# Patient Record
Sex: FEMALE | Race: WHITE | NOT HISPANIC OR LATINO | Employment: OTHER | URBAN - METROPOLITAN AREA
[De-identification: names, ages, dates, MRNs, and addresses within clinical notes are randomized per-mention and may not be internally consistent; named-entity substitution may affect disease eponyms.]

---

## 2017-01-06 DIAGNOSIS — F11.20 NARCOTIC DEPENDENCE (H): Primary | ICD-10-CM

## 2017-01-06 RX ORDER — BUPRENORPHINE HYDROCHLORIDE AND NALOXONE HYDROCHLORIDE DIHYDRATE 2; .5 MG/1; MG/1
1 TABLET SUBLINGUAL 2 TIMES DAILY
Qty: 60 TABLET | Refills: 0 | Status: CANCELLED | OUTPATIENT
Start: 2017-01-06

## 2017-01-06 RX ORDER — BUPRENORPHINE HYDROCHLORIDE AND NALOXONE HYDROCHLORIDE DIHYDRATE 2; .5 MG/1; MG/1
1 TABLET SUBLINGUAL 2 TIMES DAILY
Qty: 60 TABLET | Refills: 0 | Status: SHIPPED | OUTPATIENT
Start: 2017-01-06 | End: 2017-02-15

## 2017-01-06 NOTE — TELEPHONE ENCOUNTER
Dr West -- please review and sign/advise. Pt did not show for appointment on 1/2. Thank you    DAVE BillsN, RN  [facility]

## 2017-01-06 NOTE — TELEPHONE ENCOUNTER
Buprenorphine/Nalox 2MG 0.5MG SL TB      Last Written Prescription Date:  11/16/2016  Last Fill Quantity: 60,   # refills: 0  Last Office Visit with [facility] or [facility] prescribing provider: 01/02/2017  Future Office visit:       Routing refill request to provider for review/approval because:  Drug not on the [facility] or [facility] refill protocol or controlled substance

## 2017-01-13 NOTE — TELEPHONE ENCOUNTER
Left message for patient that she needs to be seen in clinic. Asked patient to call and schedule this appointment before requesting more refills.    Dagmar Santiago, DAVEN, RN  [facility]

## 2017-02-15 ENCOUNTER — OFFICE VISIT (OUTPATIENT)
Dept: FAMILY MEDICINE | Facility: CLINIC | Age: 62
End: 2017-02-15
Payer: COMMERCIAL

## 2017-02-15 VITALS
OXYGEN SATURATION: 96 % | BODY MASS INDEX: 21.94 KG/M2 | TEMPERATURE: 97.8 F | HEIGHT: 66 IN | SYSTOLIC BLOOD PRESSURE: 143 MMHG | DIASTOLIC BLOOD PRESSURE: 78 MMHG | WEIGHT: 136.5 LBS | HEART RATE: 86 BPM

## 2017-02-15 DIAGNOSIS — E11.9 TYPE 2 DIABETES MELLITUS WITHOUT COMPLICATION, WITHOUT LONG-TERM CURRENT USE OF INSULIN (H): ICD-10-CM

## 2017-02-15 DIAGNOSIS — F10.288 ALCOHOL DEPENDENCE WITH OTHER ALCOHOL-INDUCED DISORDER (H): ICD-10-CM

## 2017-02-15 DIAGNOSIS — S82.222K CLOSED DISPLACED TRANSVERSE FRACTURE OF SHAFT OF LEFT TIBIA WITH NONUNION, SUBSEQUENT ENCOUNTER: Primary | ICD-10-CM

## 2017-02-15 DIAGNOSIS — F11.20 NARCOTIC DEPENDENCE (H): ICD-10-CM

## 2017-02-15 LAB
BASOPHILS # BLD AUTO: 0 10E9/L (ref 0–0.2)
BASOPHILS NFR BLD AUTO: 0.5 %
CRP SERPL-MCNC: <2.9 MG/L (ref 0–8)
DIFFERENTIAL METHOD BLD: ABNORMAL
EOSINOPHIL # BLD AUTO: 0.1 10E9/L (ref 0–0.7)
EOSINOPHIL NFR BLD AUTO: 1.6 %
ERYTHROCYTE [DISTWIDTH] IN BLOOD BY AUTOMATED COUNT: 14.4 % (ref 10–15)
ERYTHROCYTE [SEDIMENTATION RATE] IN BLOOD BY WESTERGREN METHOD: 37 MM/H (ref 0–30)
HBA1C MFR BLD: 5.7 % (ref 4.3–6)
HCT VFR BLD AUTO: 33.1 % (ref 35–47)
HGB BLD-MCNC: 10.8 G/DL (ref 11.7–15.7)
LYMPHOCYTES # BLD AUTO: 1.1 10E9/L (ref 0.8–5.3)
LYMPHOCYTES NFR BLD AUTO: 14.3 %
MCH RBC QN AUTO: 30.4 PG (ref 26.5–33)
MCHC RBC AUTO-ENTMCNC: 32.6 G/DL (ref 31.5–36.5)
MCV RBC AUTO: 93 FL (ref 78–100)
MONOCYTES # BLD AUTO: 0.4 10E9/L (ref 0–1.3)
MONOCYTES NFR BLD AUTO: 5.5 %
NEUTROPHILS # BLD AUTO: 6.2 10E9/L (ref 1.6–8.3)
NEUTROPHILS NFR BLD AUTO: 78.1 %
PLATELET # BLD AUTO: 156 10E9/L (ref 150–450)
RBC # BLD AUTO: 3.55 10E12/L (ref 3.8–5.2)
WBC # BLD AUTO: 8 10E9/L (ref 4–11)

## 2017-02-15 PROCEDURE — 86140 C-REACTIVE PROTEIN: CPT | Performed by: FAMILY MEDICINE

## 2017-02-15 PROCEDURE — 85025 COMPLETE CBC W/AUTO DIFF WBC: CPT | Performed by: FAMILY MEDICINE

## 2017-02-15 PROCEDURE — 82306 VITAMIN D 25 HYDROXY: CPT | Performed by: FAMILY MEDICINE

## 2017-02-15 PROCEDURE — 36415 COLL VENOUS BLD VENIPUNCTURE: CPT | Performed by: FAMILY MEDICINE

## 2017-02-15 PROCEDURE — 83036 HEMOGLOBIN GLYCOSYLATED A1C: CPT | Performed by: FAMILY MEDICINE

## 2017-02-15 PROCEDURE — 85652 RBC SED RATE AUTOMATED: CPT | Performed by: FAMILY MEDICINE

## 2017-02-15 PROCEDURE — 99215 OFFICE O/P EST HI 40 MIN: CPT | Performed by: FAMILY MEDICINE

## 2017-02-15 RX ORDER — DULOXETIN HYDROCHLORIDE 20 MG/1
20 CAPSULE, DELAYED RELEASE ORAL 2 TIMES DAILY
Qty: 60 CAPSULE | Refills: 1 | Status: SHIPPED | OUTPATIENT
Start: 2017-02-15 | End: 2017-06-11

## 2017-02-15 RX ORDER — ERGOCALCIFEROL 1.25 MG/1
50000 CAPSULE, LIQUID FILLED ORAL
Qty: 8 CAPSULE | Refills: 0 | Status: SHIPPED | OUTPATIENT
Start: 2017-02-15 | End: 2017-04-06

## 2017-02-15 RX ORDER — BUPRENORPHINE HYDROCHLORIDE AND NALOXONE HYDROCHLORIDE DIHYDRATE 2; .5 MG/1; MG/1
1 TABLET SUBLINGUAL 2 TIMES DAILY
Qty: 60 TABLET | Refills: 0 | Status: SHIPPED | OUTPATIENT
Start: 2017-02-15 | End: 2017-03-15

## 2017-02-15 RX ORDER — TRAMADOL HYDROCHLORIDE 50 MG/1
TABLET ORAL
Qty: 28 TABLET | Refills: 0 | Status: SHIPPED | OUTPATIENT
Start: 2017-02-15 | End: 2018-11-05

## 2017-02-15 NOTE — MR AVS SNAPSHOT
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  -Start taking vitamin D once weekly.  -I will let you know when I get the results from lab.  -Please follow through on your DEXA scan and I will let you know when I get the results.        Follow-ups after your visit        Future tests that were ordered for you today     Open Future Orders        Priority Expected Expires Ordered    DX Hip/Pelvis/Spine Routine  2/15/2018 2/15/2017            Who to contact     If you have questions or need follow up information about today's clinic visit or your schedule please contact [facility] directly at [phone].  Normal or non-critical lab and imaging results will be communicated to you by MyChart, letter or phone within 4 business days after the clinic has received the results. If you do not hear from us within 7 days, please contact the clinic through [employer] or phone. If you have a critical or abnormal lab result, we will notify you by phone as soon as possible.  Submit refill requests through [employer] or call your pharmacy and they will forward the refill request to us. Please allow 3 business days for your refill to be completed.          Additional Information About Your Visit        MyChart Information     [employer] gives you secure access to your electronic  "health record. If you see a primary care provider, you can also send messages to your care team and make appointments. If you have questions, please call your primary care clinic.  If you do not have a primary care provider, please call [phone] and they will assist you.        Care EveryWhere ID     This is your Care EveryWhere ID. This could be used by other organizations to access your [place] medical records  [id number]        Your Vitals Were     Pulse Temperature Height Pulse Oximetry BMI (Body Mass Index)       86 97.8  F (36.6  C) (Oral) 5' 6\" (1.676 m) 96% 22.03 kg/m2        Blood Pressure from Last 3 Encounters:   02/15/17 143/78   10/19/16 140/75   07/28/16 112/60    Weight from Last 3 Encounters:   02/15/17 136 lb 8 oz (61.9 kg)   10/19/16 129 lb 1.6 oz (58.6 kg)   07/28/16 139 lb 1.6 oz (63.1 kg)              We Performed the Following     CBC with platelets and differential     CRP inflammation     ESR: Erythrocyte sedimentation rate     Hemoglobin A1c     Vitamin D Deficiency          Today's Medication Changes          These changes are accurate as of: 2/15/17 11:59 PM.  If you have any questions, ask your nurse or doctor.               Start taking these medicines.        Dose/Directions    DULoxetine 20 MG EC capsule   Commonly known as:  CYMBALTA   Started by:  Ishaan West MD        Dose:  20 mg   Take 1 capsule (20 mg) by mouth 2 times daily   Quantity:  60 capsule   Refills:  1       vitamin D 56612 UNIT capsule   Commonly known as:  ERGOCALCIFEROL   Started by:  Ishaan West MD        Dose:  07881 Units   Take 1 capsule (50,000 Units) by mouth every 7 days for 8 doses   Quantity:  8 capsule   Refills:  0         These medicines have changed or have updated prescriptions.        Dose/Directions    * traMADol 50 MG tablet   Commonly known as:  ULTRAM   This may have changed:  Another medication with the same name was added. Make sure you understand how and when to take " each.   Changed by:  Ishaan West MD        Take by mouth every 6 hours as needed for moderate pain   Refills:  0       * traMADol 50 MG tablet   Commonly known as:  ULTRAM   This may have changed:  You were already taking a medication with the same name, and this prescription was added. Make sure you understand how and when to take each.   Changed by:  Ishaan West MD        0.5  to 1 tab once daily as needed for severe pain   Quantity:  28 tablet   Refills:  0       * Notice:  This list has 2 medication(s) that are the same as other medications prescribed for you. Read the directions carefully, and ask your doctor or other care provider to review them with you.         Where to get your medicines      These medications were sent to [facility] [address] [facility], [place] - [address] [facility] [address] [facility] [address] [facility]  [address] [facility] [address]     Phone:  [phone]     DULoxetine 20 MG EC capsule    vitamin D 35010 UNIT capsule         Some of these will need a paper prescription and others can be bought over the counter.  Ask your nurse if you have questions.     Bring a paper prescription for each of these medications     buprenorphine-naloxone 2-0.5 MG Subl sublingual tablet    traMADol 50 MG tablet                Primary Care Provider Office Phone # Fax #    Ishaan West [phone]       [facility] [address]  [facility] [address]        Thank you!     Thank you for choosing [facility]  for your care. Our goal is always to provide you with excellent care. Hearing back from our patients is one way we can continue to improve our services. Please take a few minutes to complete the written survey that you may receive in the mail after your visit with us. Thank you!             Your Updated Medication List - Protect others around you: Learn how to safely use, store and throw away your  medicines at www.disposemymeds.org.          This list is accurate as of: 2/15/17 11:59 PM.  Always use your most recent med list.                   Brand Name Dispense Instructions for use    ACE/ARB NOT PRESCRIBED (INTENTIONAL)      ACE & ARB not prescribed due to Refusal by patient       blood glucose monitoring test strip    ACCU-CHEK CHASE    100 each    Use to test blood sugar 3 times daily or as directed.       * buprenorphine-naloxone 2-0.5 MG Subl sublingual tablet    SUBOXONE    60 tablet    Place 1 tablet under the tongue 2 times daily       * buprenorphine-naloxone 2-0.5 MG Subl sublingual tablet    SUBOXONE    60 tablet    Place 1 tablet under the tongue 2 times daily       clindamycin 150 MG capsule    CLEOCIN    30 capsule    Take 1 capsule (150 mg) by mouth 3 times daily       DULoxetine 20 MG EC capsule    CYMBALTA    60 capsule    Take 1 capsule (20 mg) by mouth 2 times daily       glipiZIDE 5 MG tablet    GLUCOTROL    60 tablet    Take 1 tablet (5 mg) by mouth 2 times daily (before meals)       lactulose encephalopathy 10 GM/15ML SOLUTION    CHRONULAC    5700 mL    Take 45 mLs (30 g) by mouth 3 times daily       metFORMIN 500 MG tablet    GLUCOPHAGE    180 tablet    Take 1 tablet (500 mg) by mouth 2 times daily (with meals)       metoprolol 25 MG 24 hr tablet    TOPROL-XL    90 tablet    Take 1 tablet (25 mg) by mouth At Bedtime       order for DME     1 Device    Equipment being ordered: Home blood pressure cuff       pramipexole 0.125 MG tablet    MIRAPEX    90 tablet    Take 1 tablet (0.125 mg) by mouth At Bedtime       sertraline 50 MG tablet    ZOLOFT    90 tablet    Take 1 tablet (50 mg) by mouth daily       sulfamethoxazole-trimethoprim 800-160 MG per tablet    BACTRIM DS/SEPTRA DS    20 tablet    Take 1 tablet by mouth 2 times daily       * traMADol 50 MG tablet    ULTRAM     Take by mouth every 6 hours as needed for moderate pain       * traMADol 50 MG tablet    ULTRAM    28 tablet    0.5   to 1 tab once daily as needed for severe pain       traZODone 50 MG tablet    DESYREL    90 tablet    Take 1 tablet (50 mg) by mouth nightly as needed for sleep       tretinoin 0.025 % cream    RETIN-A    60 g    Spread a pea size amount into affected area topically at bedtime.  Use sunscreen SPF>20.       valACYclovir 500 MG tablet    VALTREX    20 tablet    Take 1 tablet (500 mg) by mouth 2 times daily as needed       vitamin D 61240 UNIT capsule    ERGOCALCIFEROL    8 capsule    Take 1 capsule (50,000 Units) by mouth every 7 days for 8 doses       * Notice:  This list has 4 medication(s) that are the same as other medications prescribed for you. Read the directions carefully, and ask your doctor or other care provider to review them with you.

## 2017-02-15 NOTE — PATIENT INSTRUCTIONS
-Please start on cymbalta daily and tramadol as needed for your pain.  -Start taking vitamin D once weekly.  -I will let you know when I get the results from lab.  -Please follow through on your DEXA scan and I will let you know when I get the results.

## 2017-02-15 NOTE — PROGRESS NOTES
SUBJECTIVE:                                                    Josette Palacios is a 62 year old female who presents to clinic today for the following health issues:    Suboxone Follow Up:  Josette Palacios is here for a periodic Suboxone follow-up.   Since last visit patient has been: doing well.    There has been: no craving.    Cues to use and relapse triggers have been: non-existent.  Recovery program has been: solid.   Contact with sponsor has been: regular.  Family and support system has been: helpful.  Patient has been going to recovery meetings:regularly.  Sobriety: patient has relapsed  Drug Screen: patient willing but screen unnecessary  Suboxone Dose: too little  Side Effects: none  Plan for Suboxone for next period: no change  Questions answered: If dosage increase could help with foot pain       Issues discussed: Narcotics desensitization  Next visit: 2 months for refills     30 minutes were spent with patient with more than 50% of time spent in counseling and coordination of care    Left Leg Injury:  Patient has been having problems with an injury to her left leg, and she has been seeing [facility] orthopedics for treatment. She has been wearing a walking boot and thinks she will have to have surgery at some point. She would like to have labs ran to check for possible infections to make sure that she is having no problems. Patient is worried that the bone is not growing back correctly, and says that she has stopped taking her bone stimulant because of it. She says that because of her history of narcotic dependence she has not been able to get pain medications, and would like to know if there is a way that her pain could be managed by increasing her dosage of suboxone.    Problem list and histories reviewed & adjusted, as indicated.  Additional history: as documented    Patient Active Problem List   Diagnosis     Narcotic dependence (H)     Perimenopausal     Nicotine dependence     Anxiety  associated with depression     Pain in joint, lower leg     Ascites     Hypothyroidism     Viral hepatitis B chronic (H)     Silicone leakage from breast implant     Hypertension, benign essential, goal below 140/90     Type 2 diabetes mellitus without complication (H)     Alcohol dependence with other alcohol-induced disorder (H)     Past Surgical History   Procedure Laterality Date     Breast implants       Orthopedic surgery       TKR left     Esophagoscopy, gastroscopy, duodenoscopy (egd), combined  1/24/2014     Procedure: COMBINED ESOPHAGOSCOPY, GASTROSCOPY, DUODENOSCOPY (EGD);;  Surgeon: Tony Moctezuma MD;  Location:  GI       Social History   Substance Use Topics     Smoking status: Current Every Day Smoker     Packs/day: 0.50     Years: 25.00     Types: Cigarettes     Smokeless tobacco: Never Used     Alcohol use No     Family History   Problem Relation Age of Onset     CANCER Father          Current Outpatient Prescriptions   Medication Sig Dispense Refill     buprenorphine-naloxone (SUBOXONE) 2-0.5 MG SUBL sublingual tablet Place 1 tablet under the tongue 2 times daily 60 tablet 0     blood glucose monitoring (ACCU-CHEK CHASE) test strip Use to test blood sugar 3 times daily or as directed. 100 each 5     buprenorphine-naloxone (SUBOXONE) 2-0.5 MG SUBL Place 1 tablet under the tongue 2 times daily 60 tablet 0     metFORMIN (GLUCOPHAGE) 500 MG tablet Take 1 tablet (500 mg) by mouth 2 times daily (with meals) 180 tablet 3     glipiZIDE (GLUCOTROL) 5 MG tablet Take 1 tablet (5 mg) by mouth 2 times daily (before meals) 60 tablet 3     traMADol (ULTRAM) 50 MG tablet Take by mouth every 6 hours as needed for moderate pain       clindamycin (CLEOCIN) 150 MG capsule Take 1 capsule (150 mg) by mouth 3 times daily 30 capsule 0     sulfamethoxazole-trimethoprim (BACTRIM DS,SEPTRA DS) 800-160 MG per tablet Take 1 tablet by mouth 2 times daily 20 tablet 0     traZODone (DESYREL) 50 MG tablet Take 1 tablet (50 mg)  by mouth nightly as needed for sleep 90 tablet 3     sertraline (ZOLOFT) 50 MG tablet Take 1 tablet (50 mg) by mouth daily 90 tablet 3     valACYclovir (VALTREX) 500 MG tablet Take 1 tablet (500 mg) by mouth 2 times daily as needed 20 tablet 3     pramipexole (MIRAPEX) 0.125 MG tablet Take 1 tablet (0.125 mg) by mouth At Bedtime 90 tablet 3     metoprolol (TOPROL-XL) 25 MG 24 hr tablet Take 1 tablet (25 mg) by mouth At Bedtime 90 tablet 3     order for DME Equipment being ordered: Home blood pressure cuff 1 Device 0     lactulose encephalopathy (CHRONULAC) 10 GM/15ML SOLUTION Take 45 mLs (30 g) by mouth 3 times daily 5700 mL 2     tretinoin (RETIN-A) 0.025 % cream Spread a pea size amount into affected area topically at bedtime.  Use sunscreen SPF>20. 60 g 3     ACE/ARB NOT PRESCRIBED, INTENTIONAL, ACE & ARB not prescribed due to Refusal by patient       No Known Allergies  Recent Labs   Lab Test 08/05/16 08/04/16 07/28/16   1146  06/03/16   1236  04/11/16   1152  03/02/16   1332   02/22/16   1308   04/15/15   1516   06/03/14   0931   03/29/12   1127   A1C  4.9   --    --   6.4*   --   12.6*   --    --    < >  5.7   --    --    < >  5.9   LDL   --    --   41   --    --    --    --    --    --   28   --    --    --   78   HDL   --    --   36*   --    --    --    --    --    --   113   --    --    --   72   TRIG   --    --   86   --    --    --    --    --    --   57   --    --    --   121   ALT   --   61   --    --   60*   --    --   64*   < >  84*   < >   --    < >  73*   CR   --   1.02   --    --   0.60   --    < >   --    < >  1.39*   < >  0.66   < >  0.63   GFRESTIMATED   --   55   --    --   >90  Non  GFR Calc     --    < >   --    < >  39*   < >  >90   < >  >90   GFRESTBLACK   --   >60   --    --   >90   GFR Calc     --    < >   --    < >  47*   < >  >90   < >  >90   POTASSIUM   --   4.2   --    --   4.8   --    < >   --    < >  3.7   < >  4.5   < >  3.0*   TSH   --    --    --   "  --    --    --    --    --    --   6.34*   --   4.01   < >   --     < > = values in this interval not displayed.      BP Readings from Last 3 Encounters:   02/15/17 143/78   10/19/16 140/75   07/28/16 112/60    Wt Readings from Last 3 Encounters:   02/15/17 61.9 kg (136 lb 8 oz)   10/19/16 58.6 kg (129 lb 1.6 oz)   07/28/16 63.1 kg (139 lb 1.6 oz)        ROS:  Positive for left leg injury    Denies headache, insomnia, chest pain, shortness of breath, cough, heartburn, bowel issues, bladder issues, neck pain, back pain, hip pain, knee pain, ankle pain, or foot pain. Remainder of ROS is negative unless otherwise noted above or in HPI.    This document serves as a record of the services and decisions personally performed and made by Ishaan West MD. It was created on his behalf by aKrlos Armstrong, a trained medical scribe. The creation of this document is based the provider's statements to the medical scribe.  Karlos Armstrong 10:54 AM February 15, 2017    OBJECTIVE:                                                    /78 (BP Location: Left arm, Patient Position: Chair, Cuff Size: Adult Regular)  Pulse 86  Temp 97.8  F (36.6  C) (Oral)  Ht 1.676 m (5' 6\")  Wt 61.9 kg (136 lb 8 oz)  SpO2 96%  BMI 22.03 kg/m2  Body mass index is 22.03 kg/(m^2).  GENERAL: healthy, alert and no distress  MS: Verus deformity at distal third of lower leg with no break in the skin, some nonpitting edema, no rash, slightly purplish hue to skin with good capillary refill, no definite tenderness but patient complains of aching in the region  SKIN: no suspicious lesions or rashes  NEURO: Normal strength and tone, mentation intact and speech normal  PSYCH: mentation appears normal, affect normal/bright    Diagnostic Test Results:  No results found for this or any previous visit (from the past 24 hour(s)).     ASSESSMENT/PLAN:                                                      (S80.929A,  L08.9) Injury of leg, superficial, " infected, unspecified laterality, initial encounter  (primary encounter diagnosis)  Comment: Prescription given for duloxetine, tramadol and vitamin D. Order placed for DEXA scan. Labs completed today.  Plan: CBC with platelets and differential, CRP         inflammation, ESR: Erythrocyte sedimentation         rate, DX Hip/Pelvis/Spine, Vitamin D         Deficiency, DULoxetine (CYMBALTA) 20 MG EC         capsule, traMADol (ULTRAM) 50 MG tablet,         vitamin D (ERGOCALCIFEROL) 18003 UNIT capsule        Start on duloxetine daily, vitamin D weekly, and tramadol as needed for pain. Follow through with DEXA scan. Follow up with results from lab and imaging.    (F10.288) Alcohol dependence with other alcohol-induced disorder (H)  Comment: Stable and unchanged since last visit. Patient is sober.  Plan: Will continue to monitor. Follow up as needed.    (E11.9) Type 2 diabetes mellitus without complication, without long-term current use of insulin (H)  Comment: At goal. Labs completed today.  Plan: Hemoglobin A1c        Follow up with results from lab.    (F11.20) Narcotic dependence (H)  Comment: Stable and unchanged since last visit. Controlled on suboxone.  Plan: buprenorphine-naloxone (SUBOXONE) 2-0.5 MG SUBL        sublingual tablet        Continue on current medication. Follow up as needed.    Patient Instructions   -Please start on cymbalta daily and tramadol as needed for your pain.  -Start taking vitamin D once weekly.  -I will let you know when I get the results from lab.  -Please follow through on your DEXA scan and I will let you know when I get the results.     40 minutes were spent with the patient with more than 50% of time spent in counseling and coordination of care regarding above assessment and plan.   The information in this document, created by the medical scribe for me, accurately reflects the services I personally performed and the decisions made by me. I have reviewed and approved this document for  accuracy prior to leaving the patient care area.   Ishaan West MD 10:54 AM February 15, 2017  [facility] [place] [facility]

## 2017-02-15 NOTE — NURSING NOTE
"Chief Complaint   Patient presents with     Medication Follow-up       Initial /78 (BP Location: Left arm, Patient Position: Chair, Cuff Size: Adult Regular)  Pulse 86  Temp 97.8  F (36.6  C) (Oral)  Ht 5' 6\" (1.676 m)  Wt 136 lb 8 oz (61.9 kg)  SpO2 96%  BMI 22.03 kg/m2 Estimated body mass index is 22.03 kg/(m^2) as calculated from the following:    Height as of this encounter: 5' 6\" (1.676 m).    Weight as of this encounter: 136 lb 8 oz (61.9 kg).  Medication Reconciliation: complete   Ligia Abad MA      "

## 2017-02-16 LAB — DEPRECATED CALCIDIOL+CALCIFEROL SERPL-MC: 10 UG/L (ref 20–75)

## 2017-02-23 ENCOUNTER — TELEPHONE (OUTPATIENT)
Dept: FAMILY MEDICINE | Facility: CLINIC | Age: 62
End: 2017-02-23

## 2017-02-23 NOTE — TELEPHONE ENCOUNTER
Spoke with pt. States she is having left leg pain and it is getting worse every day. She was just seen by Dr. West on 2/15/17 and was told to call if the pain does not improve with tramadol. Is scheduled for 3/7 Dr. Cardozo, ortho provider at [facility], but maybe seen at [facility] before then. Has to use her walker to walk. Pain is better when it is elevated. Anytime she has to move it, is painful. Is warm to the touch. No redness. Has tried ice and this has not helped. Has taken up to 2 tramadol a day and it does not phase the pain. Rates pain at this time 9/10. Described as intense, sharp pain. No numbness or tingling. Pt. Is requesting something stronger for the pain. Declines ER. Advised that she could try heat. Dr. West is out of office until tomorrow.     Sonya Olea RN  [facility]

## 2017-02-23 NOTE — TELEPHONE ENCOUNTER
Reason for call:  Patient reporting a symptom    Symptom or request: Venita called to say that she was just prescribed Tramadol for her pain and said that it is not helping as she is hardly able to walk. She said that she is making an appointment at the [facility] regarding her leg and was wondering if there was something else that she could get for the pain.    Duration (how long have symptoms been present): for a while now    Have you been treated for this before? Yes    Additional comments:     Phone Number patient can be reached at:  [phone]    Best Time:  anytime    Can we leave a detailed message on this number:  YES    Call taken on 2/23/2017 at 8:49 AM by Dedra Briscoe

## 2017-02-24 NOTE — TELEPHONE ENCOUNTER
I talked to patient. She said her  is calling every day for cancellations. She was crying and hung up on me.     Dagmar Santiago, DAVEN, RN  [facility]

## 2017-02-24 NOTE — TELEPHONE ENCOUNTER
Please advise patient to call Ortho to be seen by anyone there today to check for possible bone infection. Thanks Ishaan

## 2017-02-27 DIAGNOSIS — S82.202K CLOSED FRACTURE OF SHAFT OF LEFT TIBIA WITH NONUNION, UNSPECIFIED FRACTURE MORPHOLOGY, SUBSEQUENT ENCOUNTER: Primary | ICD-10-CM

## 2017-02-27 RX ORDER — PRAMIPEXOLE DIHYDROCHLORIDE 0.12 MG/1
0.12 TABLET ORAL AT BEDTIME
Qty: 30 TABLET | Refills: 0 | Status: SHIPPED | OUTPATIENT
Start: 2017-02-27 | End: 2017-03-28

## 2017-02-27 NOTE — TELEPHONE ENCOUNTER
Routing refill request to provider for review/approval because:  Diagnosis not active on patient's medication list Restless legs syndrome (RLS) [G25.81]     Thank you,  Kayy Garcia RN

## 2017-02-27 NOTE — TELEPHONE ENCOUNTER
Pramipexole 0.125 MG Tablets     Last Written Prescription Date: 2/15/16  Last Fill Quantity: 90, # refills: 3  Last Office Visit with [facility], [facility] or [facility] prescribing provider: 2/15/17        BP Readings from Last 3 Encounters:   02/15/17 143/78   10/19/16 140/75   07/28/16 112/60

## 2017-03-01 ENCOUNTER — TELEPHONE (OUTPATIENT)
Dept: FAMILY MEDICINE | Facility: CLINIC | Age: 62
End: 2017-03-01

## 2017-03-01 DIAGNOSIS — F17.210 CIGARETTE NICOTINE DEPENDENCE WITHOUT COMPLICATION: Primary | ICD-10-CM

## 2017-03-01 PROBLEM — S82.202K: Status: ACTIVE | Noted: 2017-03-01

## 2017-03-01 RX ORDER — TRAMADOL HYDROCHLORIDE 50 MG/1
50 TABLET ORAL 2 TIMES DAILY PRN
Qty: 28 TABLET | Refills: 0 | Status: SHIPPED | OUTPATIENT
Start: 2017-03-01 | End: 2017-04-10

## 2017-03-01 NOTE — TELEPHONE ENCOUNTER
Pt. Was notified. Closing encounter. No further action needed.    Sonya Olea RN  [facility]

## 2017-03-01 NOTE — TELEPHONE ENCOUNTER
Route to provider     Dr. West  See pt message below  Pharmacy is cued    Review and advise    Shelli Live RN

## 2017-03-01 NOTE — TELEPHONE ENCOUNTER
Tramadol 50MG tablets      Last Written Prescription Date:  2/15/17  Last Fill Quantity: 28,   # refills: 0  Last Office Visit with [facility], [facility] or  Health prescribing provider: 2/15/17  Future Office visit:       Routing refill request to provider for review/approval because:  Drug not on the [facility], [facility] or  Health refill protocol or controlled substance

## 2017-03-01 NOTE — TELEPHONE ENCOUNTER
Dispensed per  on 2/16/17.    Routing refill request for Tramadol to provider for review/approval because:  Drug not on the [facility] refill protocol     Sonya Olea RN  [facility] [place]

## 2017-03-01 NOTE — TELEPHONE ENCOUNTER
Spoke with pt. Gave her this message. She has an appt. With the orthopedic on 3/7 and with [facility] on 3/14. She had blood work done to see if there was an infection on 2/15/17.    Sonya Olea RN  [facility] [place]

## 2017-03-01 NOTE — TELEPHONE ENCOUNTER
Please contact patient- has she scheduled to see her Orthopedists to make sure her nonunion is not infected? Once she has the appointment I can do the refill. Thanks Ishaan

## 2017-03-01 NOTE — TELEPHONE ENCOUNTER
Pt states she is having ankle surgery coming and needs to quit smoking and would like Dr. West to prescribe her something like a patch or gum asap    Pt can be reached @ [phone] [place]

## 2017-03-03 DIAGNOSIS — R79.89 LOW VITAMIN D LEVEL: Primary | ICD-10-CM

## 2017-03-03 RX ORDER — ERGOCALCIFEROL 1.25 MG/1
50000 CAPSULE, LIQUID FILLED ORAL
Qty: 8 CAPSULE | Refills: 0 | Status: SHIPPED | OUTPATIENT
Start: 2017-03-03 | End: 2017-04-22

## 2017-03-04 NOTE — PROGRESS NOTES
Please notify patient: low vit D. Rx called in for 50,000 IUs once weekly for 8 weeks.     Thanks Ishaan West MD

## 2017-03-07 ENCOUNTER — TRANSFERRED RECORDS (OUTPATIENT)
Dept: HEALTH INFORMATION MANAGEMENT | Facility: CLINIC | Age: 62
End: 2017-03-07

## 2017-03-15 DIAGNOSIS — F11.20 NARCOTIC DEPENDENCE (H): ICD-10-CM

## 2017-03-15 DIAGNOSIS — Z72.0 TOBACCO ABUSE: ICD-10-CM

## 2017-03-15 RX ORDER — BUPRENORPHINE HYDROCHLORIDE AND NALOXONE HYDROCHLORIDE DIHYDRATE 2; .5 MG/1; MG/1
1 TABLET SUBLINGUAL 2 TIMES DAILY
Qty: 60 TABLET | Refills: 0 | Status: SHIPPED | OUTPATIENT
Start: 2017-03-15 | End: 2017-04-20

## 2017-03-15 NOTE — TELEPHONE ENCOUNTER
Routing refill request to provider for review/approval because:  Drug not on the [facility] refill protocol   suboxone refill   check no issue    Pt is requesting chantix  Medication/Pharmacy cued    Shelli Live RN

## 2017-03-15 NOTE — TELEPHONE ENCOUNTER
Spoke with pt regarding scripts, if any questions on chantix after talking with pharmacy pt will call      Shelli Live RN

## 2017-03-15 NOTE — TELEPHONE ENCOUNTER
Pt is requesting a script for Chantix and for buprenorphine-naloxone (SUBOXONE) 2-0.5 MG SUBL    Pt can be reached @ [phone] [place], she would like someone to call her to let her know what is going on

## 2017-03-21 DIAGNOSIS — E11.9 TYPE 2 DIABETES MELLITUS WITHOUT COMPLICATION, WITHOUT LONG-TERM CURRENT USE OF INSULIN (H): ICD-10-CM

## 2017-03-21 NOTE — TELEPHONE ENCOUNTER
Prescription approved per [facility] Refill Protocol.    Shelli Live RN

## 2017-03-21 NOTE — TELEPHONE ENCOUNTER
Metformin 1000MG Tablets         Last Written Prescription Date: 10/19/16  Last Fill Quantity: 180, # refills: 3  Last Office Visit with [facility], [facility] or [facility] prescribing provider:  2/15/17        BP Readings from Last 3 Encounters:   02/15/17 143/78   10/19/16 140/75   07/28/16 112/60     Lab Results   Component Value Date    MICROL 19 07/28/2016     No results found for: MICROALBUMIN  Creatinine   Date Value Ref Range Status   08/04/2016 1.02 mg/dL Final   ]  GFR Estimate   Date Value Ref Range Status   08/04/2016 55 ml/min/1.73m2 Final   04/11/2016 >90  Non  GFR Calc   >60 mL/min/1.7m2 Final   02/22/2016 >90  Non  GFR Calc   >60 mL/min/1.7m2 Final     GFR Estimate If Black   Date Value Ref Range Status   08/04/2016 >60 ml/min/1.73m2 Final   04/11/2016 >90   GFR Calc   >60 mL/min/1.7m2 Final   02/22/2016 >90   GFR Calc   >60 mL/min/1.7m2 Final     Lab Results   Component Value Date    CHOL 94 07/28/2016     Lab Results   Component Value Date    HDL 36 07/28/2016     Lab Results   Component Value Date    LDL 41 07/28/2016     Lab Results   Component Value Date    TRIG 86 07/28/2016     Lab Results   Component Value Date    CHOLHDLRATIO 1.3 04/15/2015     Lab Results   Component Value Date     08/04/2016     Lab Results   Component Value Date    ALT 61 08/04/2016     Lab Results   Component Value Date    A1C 5.7 02/15/2017    A1C 4.9 08/05/2016    A1C 6.4 06/03/2016    A1C 12.6 03/02/2016    A1C 6.3 11/23/2015     Potassium   Date Value Ref Range Status   08/04/2016 4.2 mmol/L Final

## 2017-03-24 ENCOUNTER — TELEPHONE (OUTPATIENT)
Dept: FAMILY MEDICINE | Facility: CLINIC | Age: 62
End: 2017-03-24

## 2017-03-24 DIAGNOSIS — Z87.891 EX-SMOKER: Primary | ICD-10-CM

## 2017-03-24 NOTE — TELEPHONE ENCOUNTER
Said that she is having problems with the medication and that they are not helping. She can be reached at [phone].

## 2017-03-27 DIAGNOSIS — I10 HYPERTENSION, BENIGN ESSENTIAL, GOAL BELOW 140/90: ICD-10-CM

## 2017-03-27 NOTE — TELEPHONE ENCOUNTER
Metoprolol ER Succinate 25MG Tabs  Last Written Prescription Date: 2/15/16  Last Fill Quantity: 90, # refills: 3    Last Office Visit with [facility], [facility] or [facility] prescribing provider:  2/15/17   Future Office Visit:        BP Readings from Last 3 Encounters:   02/15/17 143/78   10/19/16 140/75   07/28/16 112/60

## 2017-03-27 NOTE — TELEPHONE ENCOUNTER
On Chantix, no smoking x 6 weeks before surgery. Would like nicotine level before getting one at [place]. OK for nonfasting lab only, order placed.  Ishaan West MD

## 2017-03-28 DIAGNOSIS — G25.81 RESTLESS LEGS SYNDROME (RLS): Primary | ICD-10-CM

## 2017-03-28 RX ORDER — PRAMIPEXOLE DIHYDROCHLORIDE 0.12 MG/1
TABLET ORAL
Qty: 30 TABLET | Refills: 1 | Status: SHIPPED | OUTPATIENT
Start: 2017-03-28 | End: 2017-11-13

## 2017-03-28 RX ORDER — METOPROLOL SUCCINATE 25 MG/1
25 TABLET, EXTENDED RELEASE ORAL AT BEDTIME
Qty: 90 TABLET | Refills: 3 | Status: SHIPPED | OUTPATIENT
Start: 2017-03-28 | End: 2018-04-28

## 2017-03-28 NOTE — TELEPHONE ENCOUNTER
Prescription approved per [facility] Refill Protocol.    BP Readings from Last 3 Encounters:   02/15/17 143/78   10/19/16 140/75   07/28/16 112/60       Shelli Live RN

## 2017-03-28 NOTE — TELEPHONE ENCOUNTER
Prescription approved per [facility] [place] Refill Protocol.    Shelli Live RN

## 2017-04-10 DIAGNOSIS — S82.202K CLOSED FRACTURE OF SHAFT OF LEFT TIBIA WITH NONUNION, UNSPECIFIED FRACTURE MORPHOLOGY, SUBSEQUENT ENCOUNTER: ICD-10-CM

## 2017-04-10 RX ORDER — TRAMADOL HYDROCHLORIDE 50 MG/1
TABLET ORAL
Qty: 28 TABLET | Refills: 0 | Status: SHIPPED | OUTPATIENT
Start: 2017-04-10 | End: 2018-11-05

## 2017-04-10 NOTE — TELEPHONE ENCOUNTER
Dr West signed script for Tramadol-called patient and left voice mail making patient aware this has been directly faxed to the [facility] at fax # [phone]

## 2017-04-10 NOTE — TELEPHONE ENCOUNTER
Routing refill request to provider for review/approval because:  Drug not on the [facility] refill protocol   Request for tramadol refill, unable to access       Shelli Live RN

## 2017-04-20 DIAGNOSIS — F11.20 NARCOTIC DEPENDENCE (H): ICD-10-CM

## 2017-04-21 RX ORDER — BUPRENORPHINE HYDROCHLORIDE AND NALOXONE HYDROCHLORIDE DIHYDRATE 2; .5 MG/1; MG/1
TABLET SUBLINGUAL
Qty: 60 TABLET | Refills: 0 | Status: SHIPPED | OUTPATIENT
Start: 2017-04-21 | End: 2017-05-26

## 2017-04-21 NOTE — TELEPHONE ENCOUNTER
BUPRENORPHINE/NALOX 2MG/0.5MG SL TB        Last Written Prescription Date:  3/15/17  Last Fill Quantity: 60,   # refills: 0  Last Office Visit with [facility], [facility] or  [facility] prescribing provider: 2/15/17  Future Office visit:       Routing refill request to provider for review/approval because:  Drug not on the [facility] refill protocol or controlled substance

## 2017-04-21 NOTE — TELEPHONE ENCOUNTER
Dr. West -- please review and sign/advise. Thank you    DAVE BillsN, RN  [facility] [place]

## 2017-05-03 DIAGNOSIS — Z01.419 ENCOUNTER FOR ROUTINE GYNECOLOGICAL EXAMINATION: ICD-10-CM

## 2017-05-03 NOTE — TELEPHONE ENCOUNTER
VALACYCLOVIR 500MG TABLETS       Last Written Prescription Date: 3/23/16  Last Fill Quantity: 20, # refills: 3  Last Office Visit with [facility], [facility] or [facility] prescribing provider: 2/15/17        Creatinine   Date Value Ref Range Status   08/04/2016 1.02 mg/dL Final

## 2017-05-04 RX ORDER — VALACYCLOVIR HYDROCHLORIDE 500 MG/1
TABLET, FILM COATED ORAL
Qty: 20 TABLET | Refills: 0 | Status: SHIPPED | OUTPATIENT
Start: 2017-05-04 | End: 2018-04-05

## 2017-05-04 NOTE — TELEPHONE ENCOUNTER
Prescription approved per [facility] [place]ill Protocol.    Keyona Cordero RN  [facility]

## 2017-05-05 DIAGNOSIS — F41.9 ANXIETY: ICD-10-CM

## 2017-05-08 NOTE — TELEPHONE ENCOUNTER
Prescription approved per [facility] [place] Refill Protocol.    Last Office Visit with [facility] [place] primary care provider:  2/15/17   refill request for zoloft  Last PHQ-9 score on record=   PHQ-9 SCORE 3/2/2016   Total Score -   Total Score 9     Spoke with pt [facility] score 6 today    Shelli Live RN

## 2017-05-09 ASSESSMENT — PATIENT HEALTH QUESTIONNAIRE - PHQ9: SUM OF ALL RESPONSES TO PHQ QUESTIONS 1-9: 6

## 2017-05-19 ENCOUNTER — TRANSFERRED RECORDS (OUTPATIENT)
Dept: HEALTH INFORMATION MANAGEMENT | Facility: CLINIC | Age: 62
End: 2017-05-19

## 2017-05-26 ENCOUNTER — OFFICE VISIT (OUTPATIENT)
Dept: FAMILY MEDICINE | Facility: CLINIC | Age: 62
End: 2017-05-26
Payer: COMMERCIAL

## 2017-05-26 VITALS
WEIGHT: 139.4 LBS | SYSTOLIC BLOOD PRESSURE: 118 MMHG | BODY MASS INDEX: 22.4 KG/M2 | OXYGEN SATURATION: 99 % | TEMPERATURE: 98 F | HEART RATE: 104 BPM | DIASTOLIC BLOOD PRESSURE: 74 MMHG | HEIGHT: 66 IN

## 2017-05-26 DIAGNOSIS — F11.20 NARCOTIC DEPENDENCE (H): ICD-10-CM

## 2017-05-26 DIAGNOSIS — S82.202K CLOSED FRACTURE OF SHAFT OF LEFT TIBIA WITH NONUNION, UNSPECIFIED FRACTURE MORPHOLOGY, SUBSEQUENT ENCOUNTER: ICD-10-CM

## 2017-05-26 DIAGNOSIS — E11.9 TYPE 2 DIABETES MELLITUS WITHOUT COMPLICATION, WITHOUT LONG-TERM CURRENT USE OF INSULIN (H): Primary | ICD-10-CM

## 2017-05-26 DIAGNOSIS — F41.8 ANXIETY ASSOCIATED WITH DEPRESSION: ICD-10-CM

## 2017-05-26 PROCEDURE — 99214 OFFICE O/P EST MOD 30 MIN: CPT | Performed by: FAMILY MEDICINE

## 2017-05-26 RX ORDER — BUPRENORPHINE HYDROCHLORIDE AND NALOXONE HYDROCHLORIDE DIHYDRATE 2; .5 MG/1; MG/1
TABLET SUBLINGUAL
Qty: 60 TABLET | Refills: 0 | Status: SHIPPED | OUTPATIENT
Start: 2017-05-26 | End: 2017-07-11

## 2017-05-26 NOTE — PATIENT INSTRUCTIONS
-Please come back for a recheck in 4-5 weeks.  -Continue checking your sugars in the morning before eating and an hour after eating so we can tell how your sugars are doing. You can continue off the metformin and glipizide.  -Start on 1.5 tablets of sertraline daily for your anxiety.

## 2017-05-26 NOTE — PROGRESS NOTES
SUBJECTIVE:                                                    Josette Palacios is a 62 year old female who presents to clinic today for the following health issues:    Hospital Follow-up Visit:    Hospital/Nursing Home/IP Rehab Facility: [facility]   Date of Admission: 5/19/2017  Date of Discharge: 5/20/17  Reason(s) for Admission: low blood sugar             Problems taking medications regularly:  None       Medication changes since discharge: medications has stop. Glipizide and metformin        Problems adhering to non-medication therapy:  None    Summary of hospitalization:  [facility] discharge summary reviewed  Diagnostic Tests/Treatments reviewed.  Follow up needed: none  Other Healthcare Providers Involved in Patient s Care:         None  Update since discharge: improved.     Post Discharge Medication Reconciliation: discharge medications reconciled and changed, per note/orders (see AVS).  Plan of care communicated with patient and family     Coding guidelines for this visit:  Type of Medical   Decision Making Face-to-Face Visit       within 7 Days of discharge Face-to-Face Visit        within 14 days of discharge   Moderate Complexity [id number]   High Complexity [id number]          Patient was in the hospital on 5/19/17 for hypoglycemia. Her  reports that she was shaking and sweaty and he was afraid she was having a stroke due to withdrawal from suboxone, and he called 911 and they found that her blood sugar was 34. She has stopped taking her metformin and glipizide since she was hospitalized, and has been checking her blood sugar numbers at home and they have been 120-160 without medications. Patient has had no problems with hypoglycemia since starting the medications. No problems with kidney function. She reports that she has stopped smoking and is not taking her chantix, and she has instead been using a vapor with no nicotine so that she has the act of smoking without the  chemicals. Patient has started back on her suboxone and is not taking oxycodone, and thinks she is doing well.    Status Post Foot Surgery:  Patient has recently had surgery on her left foot to realign her bones post surgery. She is still wearing a contraption to hold all of the bones in place, and says that she is doing ok. She arrived to clinic in a wheelchair with her . Patient has been avoiding getting the spot wet or infected. She has a history of complications to a previous left foot surgery where the screws and plates placed broke and her bone became infected.     Anxiety:  Patient says that she has been feeling tense and anxious due to her recent medical problems. She says that she still believes her sertraline is helpful, but she is not sure that the current dosage is high enough, and she is wondering if the dosage could be increased. Patient has also been in the process of quitting smoking and starting back on suboxone, which has been heightening her anxiety.    Problem list and histories reviewed & adjusted, as indicated.  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, unspecified fracture morphology, subsequent encounter     Low vitamin D level     Past Surgical History:   Procedure Laterality Date     breast implants       ESOPHAGOSCOPY, GASTROSCOPY, DUODENOSCOPY (EGD), COMBINED  1/24/2014    Procedure: COMBINED ESOPHAGOSCOPY, GASTROSCOPY, DUODENOSCOPY (EGD);;  Surgeon: Tony Moctezuma MD;  Location:  [facility]     ORTHOPEDIC SURGERY      TKR left       Social History   Substance Use Topics     Smoking status: Current Every Day Smoker     Packs/day: 0.50     Years: 25.00     Types: Cigarettes     Smokeless tobacco: Never Used     Alcohol use No     Family History   Problem Relation Age of Onset     CANCER Father          Current Outpatient Prescriptions   Medication Sig Dispense Refill     VITAMIN D, CHOLECALCIFEROL, PO Take 5,000 Units by mouth daily       sertraline (ZOLOFT) 50 MG tablet TAKE 1 TABLET(50 MG) BY MOUTH DAILY 90 tablet 1     metoprolol (TOPROL-XL) 25 MG 24 hr tablet Take 1 tablet (25 mg) by mouth At Bedtime 90 tablet 3     valACYclovir (VALTREX) 500 MG tablet TAKE ONE TABLET BY MOUTH TWICE DAILY AS NEEDED 20 tablet 0     buprenorphine-naloxone (SUBOXONE) 2-0.5 MG SUBL sublingual tablet TAKE 1 TABLET UNDERTONGUE TWICE DAILY 60 tablet 0     traMADol (ULTRAM) 50 MG tablet TAKE 1 TABLET BY MOUTH TWICE DAILY AS NEEDED FOR MODERATE PAIN 28 tablet 0     pramipexole (MIRAPEX) 0.125 MG tablet TAKE 1 TABLET(0.125 MG) BY MOUTH AT BEDTIME 30 tablet 1     metFORMIN (GLUCOPHAGE) 500 MG tablet Take 1 tablet (500 mg) by mouth 2 times daily (with meals) 180 tablet 1     varenicline (CHANTIX STARTING MONTH PAK) 0.5 MG X 11 & 1 MG X 42 tablet Take 0.5 mg tab daily for 3 days, then 0.5 mg tab twice daily for 4 days, then 1 mg twice daily. 53 tablet 0     nicotine polacrilex (RA MINI NICOTINE) 4 MG lozenge Place 1 lozenge (4 mg) inside cheek as needed for smoking cessation 360 tablet 1     DULoxetine (CYMBALTA) 20 MG EC capsule Take 1 capsule (20 mg) by mouth 2 times daily 60 capsule 1     traMADol (ULTRAM) 50 MG tablet 0.5  to 1 tab once daily as needed for severe pain 28 tablet 0     blood glucose monitoring (ACCU-CHEK CHASE) test strip Use to test blood sugar 3 times daily or as directed. 100 each 5     buprenorphine-naloxone (SUBOXONE) 2-0.5 MG SUBL Place 1 tablet under the tongue 2 times daily 60 tablet 0      glipiZIDE (GLUCOTROL) 5 MG tablet Take 1 tablet (5 mg) by mouth 2 times daily (before meals) 60 tablet 3     clindamycin (CLEOCIN) 150 MG capsule Take 1 capsule (150 mg) by mouth 3 times daily 30 capsule 0     sulfamethoxazole-trimethoprim (BACTRIM DS,SEPTRA DS) 800-160 MG per tablet Take 1 tablet by mouth 2 times daily 20 tablet 0     traZODone (DESYREL) 50 MG tablet Take 1 tablet (50 mg) by mouth nightly as needed for sleep 90 tablet 3     order for DME Equipment being ordered: Home blood pressure cuff 1 Device 0     lactulose encephalopathy (CHRONULAC) 10 GM/15ML SOLUTION Take 45 mLs (30 g) by mouth 3 times daily 5700 mL 2     tretinoin (RETIN-A) 0.025 % cream Spread a pea size amount into affected area topically at bedtime.  Use sunscreen SPF>20. 60 g 3     ACE/ARB NOT PRESCRIBED, INTENTIONAL, ACE & ARB not prescribed due to Refusal by patient       No Known Allergies  Recent Labs   Lab Test  02/15/17   1123 08/05/16 08/04/16 07/28/16   1146  06/03/16   1236  04/11/16   1152   02/22/16   1308   04/15/15   1516   06/03/14   0931   03/29/12   1127   A1C  5.7  4.9   --    --   6.4*   --    < >   --    < >  5.7   --    --    < >  5.9   LDL   --    --    --   41   --    --    --    --    --   28   --    --    --   78   HDL   --    --    --   36*   --    --    --    --    --   113   --    --    --   72   TRIG   --    --    --   86   --    --    --    --    --   57   --    --    --   121   ALT   --    --   61   --    --   60*   --   64*   < >  84*   < >   --    < >  73*   CR   --    --   1.02   --    --   0.60   < >   --    < >  1.39*   < >  0.66   < >  0.63   GFRESTIMATED   --    --   55   --    --   >90  Non  GFR Calc     < >   --    < >  39*   < >  >90   < >  >90   GFRESTBLACK   --    --   >60   --    --   >90   GFR Calc     < >   --    < >  47*   < >  >90   < >  >90   POTASSIUM   --    --   4.2   --    --   4.8   < >   --    < >  3.7   < >  4.5   < >  3.0*   TSH   --    --    --     "--    --    --    --    --    --   6.34*   --   4.01   < >   --     < > = values in this interval not displayed.      BP Readings from Last 3 Encounters:   05/26/17 118/74   02/15/17 143/78   10/19/16 140/75    Wt Readings from Last 3 Encounters:   05/26/17 63.2 kg (139 lb 6.4 oz)   02/15/17 61.9 kg (136 lb 8 oz)   10/19/16 58.6 kg (129 lb 1.6 oz)        Reviewed and updated as needed this visit by clinical staff  Tobacco  Allergies  Med Hx  Surg Hx  Fam Hx  Soc Hx      Reviewed and updated as needed this visit by Provider         ROS:  Positive for left foot pain and surgical wound.    Denies headache, insomnia, chest pain, shortness of breath, cough, heartburn, bowel issues, bladder issues, neck pain, back pain, hip pain, or knee pain. Remainder of ROS is negative unless otherwise noted above or in HPI.    This document serves as a record of the services and decisions personally performed and made by Ishaan West MD. It was created on his behalf by Karlos Armstrong, a trained medical scribe. The creation of this document is based the provider's statements to the medical scribe.  Karlos Armstrong 2:26 PM May 26, 2017    OBJECTIVE:                                                    /74  Pulse 104  Temp 98  F (36.7  C) (Oral)  Ht 1.676 m (5' 6\")  Wt 63.2 kg (139 lb 6.4 oz)  SpO2 99%  BMI 22.5 kg/m2  Body mass index is 22.5 kg/(m^2).  GENERAL: healthy, alert and no distress  RESP: lungs clear to auscultation - no rales, rhonchi or wheezes  CV: regular rate and rhythm, normal S1 S2, no S3 or S4, no murmur, click or rub, no peripheral edema and peripheral pulses strong  MS: no gross musculoskeletal defects noted, no edema  SKIN: no suspicious lesions or rashes  NEURO: Normal strength and tone, mentation intact and speech normal  PSYCH: mentation appears normal, affect normal/bright    Diagnostic Test Results:  No results found for this or any previous visit (from the past 24 hour(s)). "     ASSESSMENT/PLAN:                                                      (E11.9) Type 2 diabetes mellitus without complication, without long-term current use of insulin (H)  (primary encounter diagnosis)  Comment: Improved since hospital visit. Patient recently had a bad hypoglycemia episode and has stopped her medications. No problems since stopping medications.  Plan: Continue off medications and controlling diabetes through lifestyle. Continue checking blood sugars. Follow up in 4-5 weeks for recheck.    (F11.20) Narcotic dependence (H)  Comment: Stable and unchanged since last visit. Patient has restarted on suboxone.  Plan: buprenorphine-naloxone (SUBOXONE) 2-0.5 MG SUBL        sublingual tablet        Continue on current medication. Follow up as needed.    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 Prescription given for vitamin D.  Plan: VITAMIN D, CHOLECALCIFEROL, PO        Start on vitamin D. Follow up in 4-5 weeks for a recheck.    (F41.8) Anxiety associated with depression  Comment: Worsened since last visit. Increased sertraline dosage to 1.5 tablets daily.  Plan: sertraline (ZOLOFT) 50 MG tablet        Start on sertraline 1.5 tablets daily. Follow up as needed.    Patient Instructions   -Please come back for a recheck in 4-5 weeks.  -Continue checking your sugars in the morning before eating and an hour after eating so we can tell how your sugars are doing. You can continue off the metformin and glipizide.  -Start on 1.5 tablets of sertraline daily for your anxiety.    The information in this document, created by the medical scribe for me, accurately reflects the services I personally performed and the decisions made by me. I have reviewed and approved this document for accuracy prior to leaving the patient care area.   Ishaan West MD 2:27 PM May 26, 2017  [facility] [place]

## 2017-05-26 NOTE — MR AVS SNAPSHOT
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  -Continue checking your sugars in the morning before eating and an hour after eating so we can tell how your sugars are doing. You can continue off the metformin and glipizide.  -Start on 1.5 tablets of sertraline daily for your anxiety.          Follow-ups after your visit        Who to contact     If you have questions or need follow up information about today's clinic visit or your schedule please contact [facility] directly at [phone].  Normal or non-critical lab and imaging results will be communicated to you by [employer], letter or phone within 4 business days after the clinic has received the results. If you do not hear from us within 7 days, please contact the clinic through [facility] [employer] or phone. If you have a critical or abnormal lab result, we will notify you by phone as soon as possible.  Submit refill requests through [facility] or call your pharmacy and they will forward the refill request to us. Please allow 3 business days for your refill to be completed.          Additional Information About Your Visit        Active Implants[employer] Information     [facility] gives you secure access to your electronic health record. If you see a primary care provider, you can also send messages to your care team and make appointments. If you have questions, please  "call your primary care clinic.  If you do not have a primary care provider, please call [phone] and they will assist you.        Care EveryWhere ID     This is your Care EveryWhere ID. This could be used by other organizations to access your [place] medical records  [id number]        Your Vitals Were     Pulse Temperature Height Pulse Oximetry BMI (Body Mass Index)       104 98  F (36.7  C) (Oral) 5' 6\" (1.676 m) 99% 22.5 kg/m2        Blood Pressure from Last 3 Encounters:   05/26/17 118/74   02/15/17 143/78   10/19/16 140/75    Weight from Last 3 Encounters:   05/26/17 139 lb 6.4 oz (63.2 kg)   02/15/17 136 lb 8 oz (61.9 kg)   10/19/16 129 lb 1.6 oz (58.6 kg)              Today, you had the following     No orders found for display         Today's Medication Changes          These changes are accurate as of: 5/26/17  5:27 PM.  If you have any questions, ask your nurse or doctor.               These medicines have changed or have updated prescriptions.        Dose/Directions    * buprenorphine-naloxone 2-0.5 MG Subl sublingual tablet   Commonly known as:  SUBOXONE   This may have changed:  Another medication with the same name was changed. Make sure you understand how and when to take each.   Used for:  Narcotic dependence (H)   Changed by:  Ishaan West MD        Dose:  1 tablet   Place 1 tablet under the tongue 2 times daily   Quantity:  60 tablet   Refills:  0       * buprenorphine-naloxone 2-0.5 MG Subl sublingual tablet   Commonly known as:  SUBOXONE   This may have changed:  See the new instructions.   Used for:  Narcotic dependence (H)   Changed by:  Ishaan West MD        TAKE 1 TABLET UNDERTONGUE TWICE DAILY   Quantity:  60 tablet   Refills:  0       sertraline 50 MG tablet   Commonly known as:  ZOLOFT   This may have changed:  See the new instructions.   Used for:  Anxiety associated with depression   Changed by:  Ishaan West MD        Dose:  75 mg   Take 1.5 tablets (75 " mg) by mouth daily   Quantity:  135 tablet   Refills:  1       * Notice:  This list has 2 medication(s) that are the same as other medications prescribed for you. Read the directions carefully, and ask your doctor or other care provider to review them with you.         Where to get your medicines      These medications were sent to [id number] - [facility], [place] - [address] [facility]  [address], [facility] [address]     Phone:  [phone]     sertraline 50 MG tablet         Some of these will need a paper prescription and others can be bought over the counter.  Ask your nurse if you have questions.     Bring a paper prescription for each of these medications     buprenorphine-naloxone 2-0.5 MG Subl sublingual tablet                Primary Care Provider Office Phone # Fax #    Ishaan West [phone]       [facility] [address] [place] [address]  [facility] [address]        Thank you!     Thank you for choosing [facility]  [place]. Our goal is always to provide you with excellent care. Hearing back from our patients is one way we can continue to improve our services. Please take a few minutes to complete the written survey that you may receive in the mail after your visit with us. Thank you!             Your Updated Medication List - Protect others around you: Learn how to safely use, store and throw away your medicines at www.disposemymeds.org.          This list is accurate as of: 5/26/17  5:27 PM.  Always use your most recent med list.                   Brand Name Dispense Instructions for use    ACE/ARB NOT PRESCRIBED (INTENTIONAL)      ACE & ARB not prescribed due to Refusal by patient       blood glucose monitoring test strip    ACCU-CHEK CHASE    100 each    Use to test blood sugar 3 times daily or as directed.       * buprenorphine-naloxone 2-0.5 MG Subl sublingual tablet     SUBOXONE    60 tablet    Place 1 tablet under the tongue 2 times daily       * buprenorphine-naloxone 2-0.5 MG Subl sublingual tablet    SUBOXONE    60 tablet    TAKE 1 TABLET UNDERTONGUE TWICE DAILY       clindamycin 150 MG capsule    CLEOCIN    30 capsule    Take 1 capsule (150 mg) by mouth 3 times daily       DULoxetine 20 MG EC capsule    CYMBALTA    60 capsule    Take 1 capsule (20 mg) by mouth 2 times daily       glipiZIDE 5 MG tablet    GLUCOTROL    60 tablet    Take 1 tablet (5 mg) by mouth 2 times daily (before meals)       lactulose encephalopathy 10 GM/15ML SOLUTION    CHRONULAC    5700 mL    Take 45 mLs (30 g) by mouth 3 times daily       metFORMIN 500 MG tablet    GLUCOPHAGE    180 tablet    Take 1 tablet (500 mg) by mouth 2 times daily (with meals)       metoprolol 25 MG 24 hr tablet    TOPROL-XL    90 tablet    Take 1 tablet (25 mg) by mouth At Bedtime       nicotine polacrilex 4 MG lozenge    RA MINI NICOTINE    360 tablet    Place 1 lozenge (4 mg) inside cheek as needed for smoking cessation       order for DME     1 Device    Equipment being ordered: Home blood pressure cuff       pramipexole 0.125 MG tablet    MIRAPEX    30 tablet    TAKE 1 TABLET(0.125 MG) BY MOUTH AT BEDTIME       sertraline 50 MG tablet    ZOLOFT    135 tablet    Take 1.5 tablets (75 mg) by mouth daily       sulfamethoxazole-trimethoprim 800-160 MG per tablet    BACTRIM DS/SEPTRA DS    20 tablet    Take 1 tablet by mouth 2 times daily       * traMADol 50 MG tablet    ULTRAM    28 tablet    0.5  to 1 tab once daily as needed for severe pain       * traMADol 50 MG tablet    ULTRAM    28 tablet    TAKE 1 TABLET BY MOUTH TWICE DAILY AS NEEDED FOR MODERATE PAIN       traZODone 50 MG tablet    DESYREL    90 tablet    Take 1 tablet (50 mg) by mouth nightly as needed for sleep       tretinoin 0.025 % cream    RETIN-A    60 g    Spread a pea size amount into affected area topically at bedtime.  Use sunscreen SPF>20.       valACYclovir  500 MG tablet    VALTREX    20 tablet    TAKE ONE TABLET BY MOUTH TWICE DAILY AS NEEDED       varenicline 0.5 MG X 11 & 1 MG X 42 tablet    CHANTIX STARTING MONTH BYRON    53 tablet    Take 0.5 mg tab daily for 3 days, then 0.5 mg tab twice daily for 4 days, then 1 mg twice daily.       VITAMIN D (CHOLECALCIFEROL) PO      Take 5,000 Units by mouth daily       * Notice:  This list has 4 medication(s) that are the same as other medications prescribed for you. Read the directions carefully, and ask your doctor or other care provider to review them with you.

## 2017-06-08 ENCOUNTER — TELEPHONE (OUTPATIENT)
Dept: FAMILY MEDICINE | Facility: CLINIC | Age: 62
End: 2017-06-08

## 2017-06-08 NOTE — TELEPHONE ENCOUNTER
Panel Management Review      Patient has the following on her problem list:     Hypertension   Last three blood pressure readings:  BP Readings from Last 3 Encounters:   05/26/17 118/74   02/15/17 143/78   10/19/16 140/75     Blood pressure: Passed    HTN Guidelines:  Age 18-59 BP range:  Less than 140/90  Age 60-85 with Diabetes:  Less than 140/90  Age 60-85 without Diabetes:  less than 150/90      Composite cancer screening  Chart review shows that this patient is due/due soon for the following Colonoscopy  Summary:    Patient is due/failing the following:   COLONOSCOPY    Action needed:   Patient is aware that she needs to do an colon cancer screening     Type of outreach:    patient was just here for he apt     Questions for provider review:    None                                                                                                                                    Ligia Abad MA       Chart routed to none.

## 2017-06-09 ENCOUNTER — TELEPHONE (OUTPATIENT)
Dept: FAMILY MEDICINE | Facility: CLINIC | Age: 62
End: 2017-06-09

## 2017-06-09 NOTE — TELEPHONE ENCOUNTER
Panel Management Review      Patient has the following on her problem list:     Diabetes    ASA: Not Required     Last A1C  Lab Results   Component Value Date    A1C 5.7 02/15/2017    A1C 4.9 08/05/2016    A1C 6.4 06/03/2016    A1C 12.6 03/02/2016    A1C 6.3 11/23/2015     A1C tested: Passed    Last LDL:    Lab Results   Component Value Date    CHOL 94 07/28/2016     Lab Results   Component Value Date    HDL 36 07/28/2016     Lab Results   Component Value Date    LDL 41 07/28/2016     Lab Results   Component Value Date    TRIG 86 07/28/2016     Lab Results   Component Value Date    CHOLHDLRATIO 1.3 04/15/2015     Lab Results   Component Value Date    NHDL 58 07/28/2016       Is the patient on a Statin? NO             Is the patient on Aspirin? NO        Last three blood pressure readings:  BP Readings from Last 3 Encounters:   05/26/17 118/74   02/15/17 143/78   10/19/16 140/75       Date of last diabetes office visit: 5/26/2017     Tobacco History:     History   Smoking Status     Current Every Day Smoker     Packs/day: 0.50     Years: 25.00     Types: Cigarettes   Smokeless Tobacco     Never Used           Composite cancer screening  Chart review shows that this patient is due/due soon for the following Pap Smear, Mammogram and Colonoscopy  Summary:    Patient is due/failing the following:   TSH, MAMMOGRAM and PAP    Action needed:   Patient was Just in but need TSH, Mammogram     Type of outreach:    Sent letter.    Questions for provider review:    None                                                                                                                                    Ligia Abad MA       Chart routed to none .

## 2017-06-11 DIAGNOSIS — F41.8 ANXIETY ASSOCIATED WITH DEPRESSION: Primary | ICD-10-CM

## 2017-06-12 NOTE — TELEPHONE ENCOUNTER
DULOXETINE DR 20MG CAPSULES      Last Written Prescription Date: 2/15/17  Last Fill Quantity: 60, # refills: 1  Last Office Visit with [facility], [facility] or [facility] prescribing provider: 5/26/17        BP Readings from Last 3 Encounters:   05/26/17 118/74   02/15/17 143/78   10/19/16 140/75     Pulse: (for F[place]ima)  Creatinine   Date Value Ref Range Status   08/04/2016 1.02 mg/dL Final   ]    Last PHQ-9 score on record=   PHQ-9 SCORE 5/8/2017   Total Score -   Total Score 6

## 2017-06-13 RX ORDER — DULOXETIN HYDROCHLORIDE 20 MG/1
CAPSULE, DELAYED RELEASE ORAL
Qty: 60 CAPSULE | Refills: 0 | Status: SHIPPED | OUTPATIENT
Start: 2017-06-13 | End: 2017-09-01

## 2017-07-11 DIAGNOSIS — G47.00 INSOMNIA, UNSPECIFIED TYPE: Primary | ICD-10-CM

## 2017-07-11 DIAGNOSIS — F11.20 NARCOTIC DEPENDENCE (H): ICD-10-CM

## 2017-07-12 RX ORDER — TRAZODONE HYDROCHLORIDE 50 MG/1
TABLET, FILM COATED ORAL
Qty: 90 TABLET | Refills: 0 | Status: SHIPPED | OUTPATIENT
Start: 2017-07-12 | End: 2018-03-16

## 2017-07-12 RX ORDER — BUPRENORPHINE HYDROCHLORIDE AND NALOXONE HYDROCHLORIDE DIHYDRATE 2; .5 MG/1; MG/1
TABLET SUBLINGUAL
Qty: 60 TABLET | Refills: 0 | Status: SHIPPED | OUTPATIENT
Start: 2017-07-12 | End: 2018-03-21

## 2017-07-12 NOTE — TELEPHONE ENCOUNTER
Trazodone       Last Written Prescription Date: 6/3/16  Last Fill Quantity: 90; # refills: 3  Last Office Visit with [facility], [facility] or [facility] prescribing provider:  5/26/17   Next 5 appointments (look out 90 days)     Jul 28, 2017  1:30 PM CDT   Office Visit with Ishaan West MD   [facility]    [address] [place] [address]  [facility] [id number] [phone]                   Last PHQ-9 score on record=   PHQ-9 SCORE 5/8/2017   Total Score -   Total Score 6       Lab Results   Component Value Date     08/04/2016     Lab Results   Component Value Date    ALT 61 08/04/2016         BUPRENORPHINE/NALOX 2MG/0.5MG SL TB        Last Written Prescription Date:  5/26/17  Last Fill Quantity: 60,   # refills: 0  Last Office Visit with [facility], [facility] or [facility] prescribing provider: 5/26/17  Future Office visit:    Next 5 appointments (look out 90 days)     Jul 28, 2017  1:30 PM CDT   Office Visit with Ishaan West MD   [facility] ([facility]    [address] [place] [address] [address]  [facility] [id number] [phone]                   Routing refill request to provider for review/approval because:  Drug not on the [facility], [facility] or [facility] refill protocol or controlled substance

## 2017-07-12 NOTE — TELEPHONE ENCOUNTER
Routing refill request to provider for review/approval because:  Drug not on the [facility] refill protocol     Trazodone needs dx added     review, last refill 5/26/17 60 tabs    Shelli Live RN

## 2017-07-12 NOTE — TELEPHONE ENCOUNTER
Script for suboxone was faxed to the [facility] and Venita was notified that this was done.

## 2017-07-21 ENCOUNTER — HEALTH MAINTENANCE LETTER (OUTPATIENT)
Age: 62
End: 2017-07-21

## 2017-07-28 ENCOUNTER — OFFICE VISIT (OUTPATIENT)
Dept: FAMILY MEDICINE | Facility: CLINIC | Age: 62
End: 2017-07-28
Payer: COMMERCIAL

## 2017-07-28 VITALS
DIASTOLIC BLOOD PRESSURE: 64 MMHG | HEART RATE: 82 BPM | WEIGHT: 144 LBS | RESPIRATION RATE: 16 BRPM | OXYGEN SATURATION: 96 % | SYSTOLIC BLOOD PRESSURE: 115 MMHG | BODY MASS INDEX: 23.14 KG/M2 | HEIGHT: 66 IN | TEMPERATURE: 97.6 F

## 2017-07-28 DIAGNOSIS — Z48.02 ENCOUNTER FOR REMOVAL OF SUTURES: Primary | ICD-10-CM

## 2017-07-28 PROCEDURE — 99211 OFF/OP EST MAY X REQ PHY/QHP: CPT | Performed by: FAMILY MEDICINE

## 2017-07-28 NOTE — NURSING NOTE
"Chief Complaint   Patient presents with     Suture Removal     stitches removal       Initial /64  Pulse 82  Temp 97.6  F (36.4  C) (Oral)  Resp 16  Ht 5' 6\" (1.676 m)  Wt 144 lb (65.3 kg)  SpO2 96%  BMI 23.24 kg/m2 Estimated body mass index is 23.24 kg/(m^2) as calculated from the following:    Height as of this encounter: 5' 6\" (1.676 m).    Weight as of this encounter: 144 lb (65.3 kg).  Medication Reconciliation: complete     Lillie Roth MA      "

## 2017-07-28 NOTE — MR AVS SNAPSHOT
After Visit Summary   7/28/2017    Josette Palacios    MRN: [id number]           Patient Information     Date Of Birth          1955        Visit Information        Provider Department      7/28/2017 1:30 PM Ishaan West MD [facility] [place]        Today's Diagnoses     Encounter for removal of sutures    -  1      Care Instructions    -Do not tear off the strips in one direction. Peel them half off on both sides and then take it off.  -Let me know if you have any problems. Return in 1-2 months for a diabetes recheck.          Follow-ups after your visit        Who to contact     If you have questions or need follow up information about today's clinic visit or your schedule please contact [facility] directly at [phone].  Normal or non-critical lab and imaging results will be communicated to you by [employer]lhart, letter or phone within 4 business days after the clinic has received the results. If you do not hear from us within 7 days, please contact the clinic through [employer] or phone. If you have a critical or abnormal lab result, we will notify you by phone as soon as possible.  Submit refill requests through [employer] or call your pharmacy and they will forward the refill request to us. Please allow 3 business days for your refill to be completed.          Additional Information About Your Visit        MyChart Information     [employer] gives you secure access to your electronic health record. If you see a primary care provider, you can also send messages to your care team and make appointments. If you have questions, please call your primary care clinic.  If you do not have a primary care provider, please call [phone] and they will assist you.        Care EveryWhere ID     This is your Care EveryWhere ID. This could be used by other organizations to access your [place] medical records  [id number]        Your Vitals Were     Pulse Temperature Respirations  "Height Pulse Oximetry BMI (Body Mass Index)    82 97.6  F (36.4  C) (Oral) 16 5' 6\" (1.676 m) 96% 23.24 kg/m2       Blood Pressure from Last 3 Encounters:   07/28/17 115/64   05/26/17 118/74   02/15/17 143/78    Weight from Last 3 Encounters:   07/28/17 144 lb (65.3 kg)   05/26/17 139 lb 6.4 oz (63.2 kg)   02/15/17 136 lb 8 oz (61.9 kg)              Today, you had the following     No orders found for display       Primary Care Provider Office Phone # Fax #    Ishaan West [phone]       [facility] [address] [facility] [address]  [facility] [address]        Equal Access to Services     ROSEANN MORALES : Hadii aad ku hadasho Solucas, waaxda luqadaha, qaybta kaalmada adeegyada, mack garcia . So [facility] [phone].    ATENCIÓN: Si habla español, tiene a cho disposición servicios gratuitos de asistencia lingüística. Gregg al [phone].    We comply with applicable federal civil rights laws and [place] laws. We do not discriminate on the basis of race, color, national origin, age, disability sex, sexual orientation or gender identity.            Thank you!     Thank you for choosing [facility]  for your care. Our goal is always to provide you with excellent care. Hearing back from our patients is one way we can continue to improve our services. Please take a few minutes to complete the written survey that you may receive in the mail after your visit with us. Thank you!             Your Updated Medication List - Protect others around you: Learn how to safely use, store and throw away your medicines at www.disposemymeds.org.          This list is accurate as of: 7/28/17  5:28 PM.  Always use your most recent med list.                   Brand Name Dispense Instructions for use Diagnosis    ACE/ARB NOT PRESCRIBED (INTENTIONAL)      ACE & ARB not prescribed due to Refusal by patient        blood glucose monitoring test strip    ACCU-CHEK CHASE    " 100 each    Use to test blood sugar 3 times daily or as directed.    Hyperglycemia       * buprenorphine-naloxone 2-0.5 MG Subl sublingual tablet    SUBOXONE    60 tablet    Place 1 tablet under the tongue 2 times daily    Narcotic dependence (H)       * buprenorphine-naloxone 2-0.5 MG Subl sublingual tablet    SUBOXONE    60 tablet    PLACE 1 TABLET UNDER THE TONGUE TWICE DAILY    Narcotic dependence (H)       clindamycin 150 MG capsule    CLEOCIN    30 capsule    Take 1 capsule (150 mg) by mouth 3 times daily    Cellulitis of other specified site       DULoxetine 20 MG EC capsule    CYMBALTA    60 capsule    TAKE 1 CAPSULE(20 MG) BY MOUTH TWICE DAILY    Anxiety associated with depression       glipiZIDE 5 MG tablet    GLUCOTROL    60 tablet    Take 1 tablet (5 mg) by mouth 2 times daily (before meals)    Type 2 diabetes mellitus without complication (H)       lactulose encephalopathy 10 GM/15ML SOLUTION    CHRONULAC    5700 mL    Take 45 mLs (30 g) by mouth 3 times daily    Chronic hepatitis C (H), Hepatic encephalopathy (H), Alcoholic cirrhosis of liver (H)       metFORMIN 500 MG tablet    GLUCOPHAGE    180 tablet    Take 1 tablet (500 mg) by mouth 2 times daily (with meals)    Type 2 diabetes mellitus without complication, without long-term current use of insulin (H)       metoprolol 25 MG 24 hr tablet    TOPROL-XL    90 tablet    Take 1 tablet (25 mg) by mouth At Bedtime    Hypertension, benign essential, goal below 140/90       nicotine polacrilex 4 MG lozenge    RA MINI NICOTINE    360 tablet    Place 1 lozenge (4 mg) inside cheek as needed for smoking cessation    Cigarette nicotine dependence without complication       order for DME     1 Device    Equipment being ordered: Home blood pressure cuff    Hypertension, benign essential, goal below 140/90       pramipexole 0.125 MG tablet    MIRAPEX    30 tablet    TAKE 1 TABLET(0.125 MG) BY MOUTH AT BEDTIME    Restless legs syndrome (RLS)       sertraline 50 MG  tablet    ZOLOFT    135 tablet    Take 1.5 tablets (75 mg) by mouth daily    Anxiety associated with depression       sulfamethoxazole-trimethoprim 800-160 MG per tablet    BACTRIM DS/SEPTRA DS    20 tablet    Take 1 tablet by mouth 2 times daily    Cellulitis of other specified site       * traMADol 50 MG tablet    ULTRAM    28 tablet    0.5  to 1 tab once daily as needed for severe pain        * traMADol 50 MG tablet    ULTRAM    28 tablet    TAKE 1 TABLET BY MOUTH TWICE DAILY AS NEEDED FOR MODERATE PAIN    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  Use sunscreen SPF>20.    Age-related facial wrinkles       valACYclovir 500 MG tablet    VALTREX    20 tablet    TAKE ONE TABLET BY MOUTH TWICE DAILY AS NEEDED    Encounter for routine gynecological examination       varenicline 0.5 MG X 11 & 1 MG X 42 tablet    CHANTIX STARTING MONTH BYRON    53 tablet    Take 0.5 mg tab daily for 3 days, then 0.5 mg tab twice daily for 4 days, then 1 mg twice daily.    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 Read the directions carefully, and ask your doctor or other care provider to review them with you.

## 2017-07-28 NOTE — PATIENT INSTRUCTIONS
-Do not tear off the strips in one direction. Peel them half off on both sides and then take it off.  -Let me know if you have any problems. Return in 1-2 months for a diabetes recheck.

## 2017-07-28 NOTE — PROGRESS NOTES
SUBJECTIVE:                                                    Josette Palacios is a 62 year old female who presents to clinic today for the following health issues:    Status Post Foot Surgery:  Patient has recently had surgery on her left foot to realign the bones in her feet and legs, and continues to wear a contraption to hold the bones in place. She has a history of complications due to a previous left foot surgery where the screws and plates placed in her foot broke and the bone became infected. Patient has since had a bone graft where bone was taken from her right knee and placed in her left leg 3 weeks ago. She is here today to have the sutures removed. The site of the bone graft is still sore, though improved with time since the surgery. Patient also requests a form for a handicap sticker to be completed.    Problem list and histories reviewed & adjusted, as indicated.  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, unspecified fracture morphology, subsequent encounter     Low vitamin D level     Past Surgical History:   Procedure Laterality Date     breast implants       ESOPHAGOSCOPY, GASTROSCOPY, DUODENOSCOPY (EGD), COMBINED  1/24/2014    Procedure: COMBINED ESOPHAGOSCOPY, GASTROSCOPY, DUODENOSCOPY (EGD);;  Surgeon: Tony Moctezuma MD;  Location:  GI     ORTHOPEDIC SURGERY      TKR left       Social History   Substance Use Topics     Smoking status: Current Every Day Smoker     Packs/day: 0.50     Years:  25.00     Types: Cigarettes     Smokeless tobacco: Never Used     Alcohol use No     Family History   Problem Relation Age of Onset     CANCER Father          Current Outpatient Prescriptions   Medication Sig Dispense Refill     traZODone (DESYREL) 50 MG tablet TAKE 1 TABLET(50 MG) BY MOUTH EVERY EVENING AS NEEDED FOR SLEEP 90 tablet 0     buprenorphine-naloxone (SUBOXONE) 2-0.5 MG SUBL sublingual tablet PLACE 1 TABLET UNDER THE TONGUE TWICE DAILY 60 tablet 0     DULoxetine (CYMBALTA) 20 MG EC capsule TAKE 1 CAPSULE(20 MG) BY MOUTH TWICE DAILY 60 capsule 0     VITAMIN D, CHOLECALCIFEROL, PO Take 5,000 Units by mouth daily       sertraline (ZOLOFT) 50 MG tablet Take 1.5 tablets (75 mg) by mouth daily 135 tablet 1     valACYclovir (VALTREX) 500 MG tablet TAKE ONE TABLET BY MOUTH TWICE DAILY AS NEEDED 20 tablet 0     traMADol (ULTRAM) 50 MG tablet TAKE 1 TABLET BY MOUTH TWICE DAILY AS NEEDED FOR MODERATE PAIN 28 tablet 0     metoprolol (TOPROL-XL) 25 MG 24 hr tablet Take 1 tablet (25 mg) by mouth At Bedtime 90 tablet 3     pramipexole (MIRAPEX) 0.125 MG tablet TAKE 1 TABLET(0.125 MG) BY MOUTH AT BEDTIME 30 tablet 1     metFORMIN (GLUCOPHAGE) 500 MG tablet Take 1 tablet (500 mg) by mouth 2 times daily (with meals) 180 tablet 1     varenicline (CHANTIX STARTING MONTH PAK) 0.5 MG X 11 & 1 MG X 42 tablet Take 0.5 mg tab daily for 3 days, then 0.5 mg tab twice daily for 4 days, then 1 mg twice daily. 53 tablet 0     nicotine polacrilex (RA MINI NICOTINE) 4 MG lozenge Place 1 lozenge (4 mg) inside cheek as needed for smoking cessation 360 tablet 1     traMADol (ULTRAM) 50 MG tablet 0.5  to 1 tab once daily as needed for severe pain 28 tablet 0     blood glucose monitoring (ACCU-CHEK CHASE) test strip Use to test blood sugar 3 times daily or as directed. 100 each 5     buprenorphine-naloxone (SUBOXONE) 2-0.5 MG SUBL Place 1 tablet under the tongue 2 times daily 60 tablet 0     glipiZIDE (GLUCOTROL) 5 MG tablet Take 1 tablet  (5 mg) by mouth 2 times daily (before meals) 60 tablet 3     clindamycin (CLEOCIN) 150 MG capsule Take 1 capsule (150 mg) by mouth 3 times daily 30 capsule 0     sulfamethoxazole-trimethoprim (BACTRIM DS,SEPTRA DS) 800-160 MG per tablet Take 1 tablet by mouth 2 times daily 20 tablet 0     order for DME Equipment being ordered: Home blood pressure cuff 1 Device 0     lactulose encephalopathy (CHRONULAC) 10 GM/15ML SOLUTION Take 45 mLs (30 g) by mouth 3 times daily 5700 mL 2     tretinoin (RETIN-A) 0.025 % cream Spread a pea size amount into affected area topically at bedtime.  Use sunscreen SPF>20. 60 g 3     ACE/ARB NOT PRESCRIBED, INTENTIONAL, ACE & ARB not prescribed due to Refusal by patient       No Known Allergies  Recent Labs   Lab Test  02/15/17   1123 08/05/16 08/04/16 07/28/16   1146  06/03/16   1236  04/11/16   1152   02/22/16   1308   04/15/15   1516   06/03/14   0931   03/29/12   1127   A1C  5.7  4.9   --    --   6.4*   --    < >   --    < >  5.7   --    --    < >  5.9   LDL   --    --    --   41   --    --    --    --    --   28   --    --    --   78   HDL   --    --    --   36*   --    --    --    --    --   113   --    --    --   72   TRIG   --    --    --   86   --    --    --    --    --   57   --    --    --   121   ALT   --    --   61   --    --   60*   --   64*   < >  84*   < >   --    < >  73*   CR   --    --   1.02   --    --   0.60   < >   --    < >  1.39*   < >  0.66   < >  0.63   GFRESTIMATED   --    --   55   --    --   >90  Non  GFR Calc     < >   --    < >  39*   < >  >90   < >  >90   GFRESTBLACK   --    --   >60   --    --   >90   GFR Calc     < >   --    < >  47*   < >  >90   < >  >90   POTASSIUM   --    --   4.2   --    --   4.8   < >   --    < >  3.7   < >  4.5   < >  3.0*   TSH   --    --    --    --    --    --    --    --    --   6.34*   --   4.01   < >   --     < > = values in this interval not displayed.      BP Readings from Last 3 Encounters:  "  07/28/17 115/64   05/26/17 118/74   02/15/17 143/78    Wt Readings from Last 3 Encounters:   07/28/17 65.3 kg (144 lb)   05/26/17 63.2 kg (139 lb 6.4 oz)   02/15/17 61.9 kg (136 lb 8 oz)        Reviewed and updated as needed this visit by clinical staff       Reviewed and updated as needed this visit by Provider         ROS:  Positive for left foot pain and right knee pain.    Denies headache, insomnia, chest pain, shortness of breath, cough, heartburn, bowel issues, bladder issues, neck pain, back pain, hip pain. Remainder of ROS is negative unless otherwise noted above or in HPI.    This document serves as a record of the services and decisions personally performed and made by Ishaan West MD. It was created on his behalf by aKrlos Armstrong, a trained medical scribe. The creation of this document is based the provider's statements to the medical scribe.  Karlos Armstrong 2:35 PM July 28, 2017    OBJECTIVE:     /64  Pulse 82  Temp 97.6  F (36.4  C) (Oral)  Resp 16  Ht 1.676 m (5' 6\")  Wt 65.3 kg (144 lb)  SpO2 96%  BMI 23.24 kg/m2  Body mass index is 23.24 kg/(m^2).  GENERAL: healthy, alert and no distress  SKIN: 4 mattress style sutures removed from wound of right proximal tibia with some swelling of wound with no drainage, 6 mattress style sutures in laterally located wound in distal lower extremity, 6 sutures on medial aspect of lower extremity, slight irritation without signs of infection  NEURO: Normal strength and tone, mentation intact and speech normal  PSYCH: mentation appears normal, affect normal/bright    Procedure: Suture Removal  As above in physical exam, 4 mattress style sutures were removed from a wound of right proximal tibia, 6 mattress style sutures were removed from a laterally located wound on the distal left lower extremity, and 6 mattress style sutures were removed from the medial aspect of the distal left lower extremity. Sutures were removed using suture scissors " and pickups. No drainage or blood loss occurred. Wounds were covered with steristrips & benzoin.    Diagnostic Test Results:  No results found for this or any previous visit (from the past 24 hour(s)).    ASSESSMENT/PLAN:     (Z48.02) Encounter for removal of sutures  (primary encounter diagnosis)  Comment: Suture removal completed today.  Plan: Follow up as needed.    Patient Instructions   -Do not tear off the strips in one direction. Peel them half off on both sides and then take it off.  -Let me know if you have any problems. Return in 1-2 months for a diabetes recheck.    The information in this document, created by the medical scribe for me, accurately reflects the services I personally performed and the decisions made by me. I have reviewed and approved this document for accuracy prior to leaving the patient care area.   Ishaan West MD 2:35 PM July 28, 2017  [facility] [place]

## 2017-08-18 DIAGNOSIS — F11.20 NARCOTIC DEPENDENCE (H): ICD-10-CM

## 2017-08-18 RX ORDER — BUPRENORPHINE HYDROCHLORIDE AND NALOXONE HYDROCHLORIDE DIHYDRATE 2; .5 MG/1; MG/1
1 TABLET SUBLINGUAL 2 TIMES DAILY
Qty: 60 TABLET | Refills: 0 | Status: SHIPPED | OUTPATIENT
Start: 2017-08-18 | End: 2017-09-29

## 2017-08-18 NOTE — TELEPHONE ENCOUNTER
Controlled Substance Refill Request for buprenorphine-naloxone (SUBOXONE) 2-0.5 MG SUBL sublingual tablet  Problem List Complete:     checked in past 6 months?    LAST REFILL: 7/12/17 #60/0  LAST OV: 7/28/17

## 2017-08-18 NOTE — TELEPHONE ENCOUNTER
Dr. West,     Patient requests a Suboxone refill. Per , last filled on 7/12/17.      Thank you,   Keyona Cordero RN  [facility]

## 2017-09-29 DIAGNOSIS — F11.20 NARCOTIC DEPENDENCE (H): ICD-10-CM

## 2017-09-29 NOTE — TELEPHONE ENCOUNTER
The only provider who can write suboxone here is Dr. West.  It requires a specific license.  He will be back Monday. CARLY Mireles

## 2017-09-29 NOTE — TELEPHONE ENCOUNTER
Dr. West--    Patient requesting refill of:     buprenorphine-naloxone (SUBOXONE) 2-0.5 MG SUBL sublingual tablet     checked today, no concerns. Patient filled 8/18/17 script on 8/29/17.      Keyona Cordero RN  [facility]

## 2017-10-01 RX ORDER — BUPRENORPHINE HYDROCHLORIDE AND NALOXONE HYDROCHLORIDE DIHYDRATE 2; .5 MG/1; MG/1
TABLET SUBLINGUAL
Qty: 60 TABLET | Refills: 0 | Status: SHIPPED | OUTPATIENT
Start: 2017-10-01 | End: 2017-11-22

## 2017-10-02 NOTE — TELEPHONE ENCOUNTER
Spoke with patient, she will call back to schedule follow up also mailing script direclty to temi in [facility]

## 2017-11-13 DIAGNOSIS — G25.81 RESTLESS LEGS SYNDROME (RLS): ICD-10-CM

## 2017-11-13 RX ORDER — PRAMIPEXOLE DIHYDROCHLORIDE 0.12 MG/1
TABLET ORAL
Qty: 30 TABLET | Refills: 0 | Status: SHIPPED | OUTPATIENT
Start: 2017-11-13 | End: 2017-12-19

## 2017-11-13 NOTE — TELEPHONE ENCOUNTER
pramipexole (MIRAPEX) 0.125 MG tablet  Last Written Prescription Date: 3/28/17  Last Fill Quantity: 30, # refills: 1  Last Office Visit with [facility], [facility] or [facility] prescribing provider: 7/28/17       BP Readings from Last 3 Encounters:   07/28/17 115/64   05/26/17 118/74   02/15/17 143/78     Prescription approved per [facility] Refill Protocol.    DAVE SextonN RN  [facility]

## 2017-11-21 NOTE — PROGRESS NOTES
"  SUBJECTIVE:   Josette Palacios is a 62 year old female who presents to clinic today for the following health issues:    Patient is here with her .    Diabetes Follow-up      Patient is checking blood sugars: one times a week. Something is wrong with the machine     Diabetic concerns: None     Symptoms of hypoglycemia (low blood sugar): dizzy     Paresthesias (numbness or burning in feet) or sores: No     Date of last diabetic eye exam: 1 year ago    Amount of exercise or physical activity: None    Problems taking medications regularly: No    Medication side effects: none    Diet: regular (no restrictions)    Patient has been checking her blood sugars once a week. She says that something is wrong with the machine, and does not know how her diabetes is doing. She has occasional symptoms of dizziness, which she attributes to hypoglycemia.    Derm Problem      Duration: 1 month and a half    Description  Location: upper back right side, right arm, and left leg. Scratches them until it opens up  Itching: moderate    Intensity:  moderate    Accompanying signs and symptoms: None    History (similar episodes/previous evaluation): None    Precipitating or alleviating factors:  New exposures:  None, can not think of anything   Recent travel: no      Therapies tried and outcome: Lotion and neosporin here and there but might need something stronger for it since itchiness is not going away.    Patient has been having problems with itchy skin for the last 1.5 months. She says that the itching can be moderate to severe, and her  says that she is \"always scratching\". No history of similar problems, and she cannot think of any new exposures. She has been trying neosporin and lotion, but has not noticed much improvement. Her  wonders if it could have been a stress reaction to her ongoing problems with recovering from 3 broken screws in her left foot, after she had already had problems with previously broken " "screw, and she continues to wear a contraption to help rebuild her left foot and leg. History of pancreatitis and problems with her liver. Patient continues to use suboxone for her history of narcotic dependence, and says that she has taken an extra tablet as needed when her pain is severe. She has had a relapse in her alcoholism, and says that she has been having at least a drink a day. She says that due to her foot problems, she has been feeling trapped. Her  has frequently been away for work, and he says that she often has a hard time \"seeing the light at the end of the tunnel\", especially when she is alone.    Patient says that she has been having problems with a cold for the last couple days. She has been congested and a cough, but is not particularly concerned.    Problem list and histories reviewed & adjusted, as indicated.  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, unspecified fracture morphology, subsequent encounter     Low vitamin D level     Past Surgical History:   Procedure Laterality Date     breast implants       ESOPHAGOSCOPY, GASTROSCOPY, DUODENOSCOPY (EGD), COMBINED  1/24/2014    Procedure: COMBINED ESOPHAGOSCOPY, GASTROSCOPY, DUODENOSCOPY (EGD);;  Surgeon: Tony Moctezuma MD;  Location:  GI     ORTHOPEDIC SURGERY      TKR left       Social History   Substance Use Topics     Smoking status: Current Every Day Smoker     Packs/day: 0.50     Years: 25.00     Types: Cigarettes     Smokeless " tobacco: Never Used     Alcohol use No     Family History   Problem Relation Age of Onset     CANCER Father          Current Outpatient Prescriptions   Medication Sig Dispense Refill     pramipexole (MIRAPEX) 0.125 MG tablet TAKE 1 TABLET(0.125 MG) BY MOUTH AT BEDTIME 30 tablet 0     buprenorphine-naloxone (SUBOXONE) 2-0.5 MG SUBL sublingual tablet PLACE 1 TABLET UNDER THE TONGUE 2 TIMES DAILY. 60 tablet 0     DULoxetine (CYMBALTA) 20 MG EC capsule TAKE 1 CAPSULE BY MOUTH TWICE DAILY 180 capsule 2     traZODone (DESYREL) 50 MG tablet TAKE 1 TABLET(50 MG) BY MOUTH EVERY EVENING AS NEEDED FOR SLEEP 90 tablet 0     buprenorphine-naloxone (SUBOXONE) 2-0.5 MG SUBL sublingual tablet PLACE 1 TABLET UNDER THE TONGUE TWICE DAILY 60 tablet 0     VITAMIN D, CHOLECALCIFEROL, PO Take 5,000 Units by mouth daily       sertraline (ZOLOFT) 50 MG tablet Take 1.5 tablets (75 mg) by mouth daily 135 tablet 1     valACYclovir (VALTREX) 500 MG tablet TAKE ONE TABLET BY MOUTH TWICE DAILY AS NEEDED 20 tablet 0     traMADol (ULTRAM) 50 MG tablet TAKE 1 TABLET BY MOUTH TWICE DAILY AS NEEDED FOR MODERATE PAIN 28 tablet 0     metoprolol (TOPROL-XL) 25 MG 24 hr tablet Take 1 tablet (25 mg) by mouth At Bedtime 90 tablet 3     metFORMIN (GLUCOPHAGE) 500 MG tablet Take 1 tablet (500 mg) by mouth 2 times daily (with meals) 180 tablet 1     varenicline (CHANTIX STARTING MONTH PAK) 0.5 MG X 11 & 1 MG X 42 tablet Take 0.5 mg tab daily for 3 days, then 0.5 mg tab twice daily for 4 days, then 1 mg twice daily. 53 tablet 0     nicotine polacrilex (RA MINI NICOTINE) 4 MG lozenge Place 1 lozenge (4 mg) inside cheek as needed for smoking cessation 360 tablet 1     traMADol (ULTRAM) 50 MG tablet 0.5  to 1 tab once daily as needed for severe pain 28 tablet 0     blood glucose monitoring (ACCU-CHEK CAHSE) test strip Use to test blood sugar 3 times daily or as directed. 100 each 5     glipiZIDE (GLUCOTROL) 5 MG tablet Take 1 tablet (5 mg) by mouth 2 times daily  (before meals) 60 tablet 3     clindamycin (CLEOCIN) 150 MG capsule Take 1 capsule (150 mg) by mouth 3 times daily 30 capsule 0     sulfamethoxazole-trimethoprim (BACTRIM DS,SEPTRA DS) 800-160 MG per tablet Take 1 tablet by mouth 2 times daily 20 tablet 0     order for DME Equipment being ordered: Home blood pressure cuff 1 Device 0     lactulose encephalopathy (CHRONULAC) 10 GM/15ML SOLUTION Take 45 mLs (30 g) by mouth 3 times daily 5700 mL 2     tretinoin (RETIN-A) 0.025 % cream Spread a pea size amount into affected area topically at bedtime.  Use sunscreen SPF>20. 60 g 3     ACE/ARB NOT PRESCRIBED, INTENTIONAL, ACE & ARB not prescribed due to Refusal by patient       No Known Allergies  Recent Labs   Lab Test  11/22/17   1600  02/15/17   1123 08/05/16 08/04/16 07/28/16   1146   04/11/16   1152   02/22/16   1308   04/15/15   1516   06/03/14   0931   03/29/12   1127   A1C  5.5  5.7  4.9   --    --    < >   --    < >   --    < >  5.7   --    --    < >  5.9   LDL   --    --    --    --   41   --    --    --    --    --   28   --    --    --   78   HDL   --    --    --    --   36*   --    --    --    --    --   113   --    --    --   72   TRIG   --    --    --    --   86   --    --    --    --    --   57   --    --    --   121   ALT   --    --    --   61   --    --   60*   --   64*   < >  84*   < >   --    < >  73*   CR   --    --    --   1.02   --    --   0.60   < >   --    < >  1.39*   < >  0.66   < >  0.63   GFRESTIMATED   --    --    --   55   --    --   >90  Non  GFR Calc     < >   --    < >  39*   < >  >90   < >  >90   GFRESTBLACK   --    --    --   >60   --    --   >90   GFR Calc     < >   --    < >  47*   < >  >90   < >  >90   POTASSIUM   --    --    --   4.2   --    --   4.8   < >   --    < >  3.7   < >  4.5   < >  3.0*   TSH   --    --    --    --    --    --    --    --    --    --   6.34*   --   4.01   < >   --     < > = values in this interval not displayed.      BP  Readings from Last 3 Encounters:   11/22/17 144/66   07/28/17 115/64   05/26/17 118/74    Wt Readings from Last 3 Encounters:   11/22/17 62.3 kg (137 lb 6.4 oz)   07/28/17 65.3 kg (144 lb)   05/26/17 63.2 kg (139 lb 6.4 oz)        Reviewed and updated as needed this visit by clinical staff     Reviewed and updated as needed this visit by Provider         ROS:  Positive for foot pain, rash, congestion, cold and alcohol dependence.    Denies headache, insomnia, chest pain, shortness of breath, heartburn, bowel issues, bladder issues, neck pain, back pain, hip pain, knee pain. Remainder of ROS is negative unless otherwise noted above or in HPI.    This document serves as a record of the services and decisions personally performed and made by Ishaan West MD. It was created on his behalf by Karlos Armstrong, a trained medical scribe. The creation of this document is based the provider's statements to the medical scribe.  Karlos Armstrong 4:17 PM November 22, 2017    OBJECTIVE:     /66  Pulse 75  Temp 97.5  F (36.4  C) (Oral)  Wt 62.3 kg (137 lb 6.4 oz)  SpO2 99%  BMI 22.18 kg/m2  Body mass index is 22.18 kg/(m^2).  GENERAL: depressed, stressed and worried  RESP: musical rhonchi and coarse breath sounds  CV: regular rate and rhythm, normal S1 S2, no S3 or S4, no murmur, click or rub, no peripheral edema and peripheral pulses strong  SKIN: excoriated papulosquamous lesions 1-3 mm scabs and red maculopapules scattered over whole posterior upper back, left upper thigh, right forearm  NEURO: Normal strength and tone, mentation intact and speech normal  PSYCH: mentation appears normal, affect down and anxious    Diagnostic Test Results:  Results for orders placed or performed in visit on 11/22/17 (from the past 24 hour(s))   HEMOGLOBIN A1C   Result Value Ref Range    Hemoglobin A1C 5.5 4.3 - 6.0 %       ASSESSMENT/PLAN:     (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  Plan: mupirocin (BACTROBAN) 2 % cream        Follow up in 1 month.    (F11.20) Narcotic dependence (H)  Comment: Stable.  Plan: buprenorphine-naloxone (SUBOXONE) 2-0.5 MG SUBL        sublingual tablet          (E11.9) Type 2 diabetes mellitus without complication, without long-term current use of insulin (H)  Comment: A1C of 5.5 in non-diabetic range.  Plan: Lipid panel reflex to direct LDL Fasting,         Albumin Random Urine Quantitative with Creat         Ratio, TSH WITH FREE T4 REFLEX, Comprehensive         metabolic panel          (F10.20) Alcohol dependence, episodic (H)  Comment: Patient currently drinking at least one drink a day and not keeping up with social contacts.  Plan: Recommend getting together with a good friend that she can talk to about how she feels under stress all of the time and try to not drink every single day.    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 Following up with orthopedics.  Plan: Reminded to ambulate very carefully on slippery or unfamiliar surfaces. Also, another reason patient was encouraged not to drink.    Patient Instructions   -Take over the counter zyrtec once a day, and apply the bactroban and over the counter diphenhydramine cream to the affected area to help with the itching.  -Please return in 1 month for a recheck.  -If the rash gets swollen and tender or if you develop a fever, please let me know or go to the emergency room so that you can be started on antibiotics.    The information in this document, created by the medical scribe for me, accurately reflects the services I personally performed and the decisions made by me. I have reviewed and approved this document for accuracy prior to leaving the patient care area.   Ishaan  MD Jenna 4:18 PM November 22, 2017  [facility] [place]

## 2017-11-22 ENCOUNTER — OFFICE VISIT (OUTPATIENT)
Dept: FAMILY MEDICINE | Facility: CLINIC | Age: 62
End: 2017-11-22
Payer: COMMERCIAL

## 2017-11-22 VITALS
SYSTOLIC BLOOD PRESSURE: 144 MMHG | WEIGHT: 137.4 LBS | OXYGEN SATURATION: 99 % | TEMPERATURE: 97.5 F | HEART RATE: 75 BPM | BODY MASS INDEX: 22.18 KG/M2 | DIASTOLIC BLOOD PRESSURE: 66 MMHG

## 2017-11-22 DIAGNOSIS — F11.20 NARCOTIC DEPENDENCE (H): ICD-10-CM

## 2017-11-22 DIAGNOSIS — L28.0 NEURODERMATITIS: Primary | ICD-10-CM

## 2017-11-22 DIAGNOSIS — S82.202K CLOSED FRACTURE OF SHAFT OF LEFT TIBIA WITH NONUNION, UNSPECIFIED FRACTURE MORPHOLOGY, SUBSEQUENT ENCOUNTER: ICD-10-CM

## 2017-11-22 DIAGNOSIS — F10.20 ALCOHOL DEPENDENCE, EPISODIC (H): ICD-10-CM

## 2017-11-22 DIAGNOSIS — E11.9 TYPE 2 DIABETES MELLITUS WITHOUT COMPLICATION, WITHOUT LONG-TERM CURRENT USE OF INSULIN (H): ICD-10-CM

## 2017-11-22 LAB
ALBUMIN SERPL-MCNC: 3.3 G/DL (ref 3.4–5)
ALP SERPL-CCNC: 156 U/L (ref 40–150)
ALT SERPL W P-5'-P-CCNC: 36 U/L (ref 0–50)
ANION GAP SERPL CALCULATED.3IONS-SCNC: 11 MMOL/L (ref 3–14)
AST SERPL W P-5'-P-CCNC: 57 U/L (ref 0–45)
BILIRUB SERPL-MCNC: 0.4 MG/DL (ref 0.2–1.3)
BUN SERPL-MCNC: 22 MG/DL (ref 7–30)
CALCIUM SERPL-MCNC: 8.8 MG/DL (ref 8.5–10.1)
CHLORIDE SERPL-SCNC: 107 MMOL/L (ref 94–109)
CHOLEST SERPL-MCNC: 144 MG/DL
CO2 SERPL-SCNC: 20 MMOL/L (ref 20–32)
CREAT SERPL-MCNC: 0.98 MG/DL (ref 0.52–1.04)
CREAT UR-MCNC: 29 MG/DL
GFR SERPL CREATININE-BSD FRML MDRD: 57 ML/MIN/1.7M2
GLUCOSE SERPL-MCNC: 112 MG/DL (ref 70–99)
HBA1C MFR BLD: 5.5 % (ref 4.3–6)
HDLC SERPL-MCNC: 100 MG/DL
LDLC SERPL CALC-MCNC: 31 MG/DL
MICROALBUMIN UR-MCNC: 234 MG/L
MICROALBUMIN/CREAT UR: 806.9 MG/G CR (ref 0–25)
NONHDLC SERPL-MCNC: 44 MG/DL
POTASSIUM SERPL-SCNC: 4.2 MMOL/L (ref 3.4–5.3)
PROT SERPL-MCNC: 9.3 G/DL (ref 6.8–8.8)
SODIUM SERPL-SCNC: 138 MMOL/L (ref 133–144)
TRIGL SERPL-MCNC: 67 MG/DL
TSH SERPL DL<=0.005 MIU/L-ACNC: 8.04 MU/L (ref 0.4–4)

## 2017-11-22 PROCEDURE — 84443 ASSAY THYROID STIM HORMONE: CPT | Performed by: FAMILY MEDICINE

## 2017-11-22 PROCEDURE — 82043 UR ALBUMIN QUANTITATIVE: CPT | Performed by: FAMILY MEDICINE

## 2017-11-22 PROCEDURE — 80061 LIPID PANEL: CPT | Performed by: FAMILY MEDICINE

## 2017-11-22 PROCEDURE — 80053 COMPREHEN METABOLIC PANEL: CPT | Performed by: FAMILY MEDICINE

## 2017-11-22 PROCEDURE — 84439 ASSAY OF FREE THYROXINE: CPT | Performed by: FAMILY MEDICINE

## 2017-11-22 PROCEDURE — 36415 COLL VENOUS BLD VENIPUNCTURE: CPT | Performed by: FAMILY MEDICINE

## 2017-11-22 PROCEDURE — 83036 HEMOGLOBIN GLYCOSYLATED A1C: CPT | Performed by: FAMILY MEDICINE

## 2017-11-22 PROCEDURE — 99215 OFFICE O/P EST HI 40 MIN: CPT | Performed by: FAMILY MEDICINE

## 2017-11-22 RX ORDER — MUPIROCIN CALCIUM 20 MG/G
CREAM TOPICAL 3 TIMES DAILY
Qty: 30 G | Refills: 1 | Status: SHIPPED | OUTPATIENT
Start: 2017-11-22 | End: 2018-05-15

## 2017-11-22 RX ORDER — BUPRENORPHINE HYDROCHLORIDE AND NALOXONE HYDROCHLORIDE DIHYDRATE 2; .5 MG/1; MG/1
TABLET SUBLINGUAL
Qty: 60 TABLET | Refills: 0 | Status: SHIPPED | OUTPATIENT
Start: 2017-11-22 | End: 2018-01-03

## 2017-11-22 NOTE — NURSING NOTE
"Chief Complaint   Patient presents with     Diabetes     recheck medications     Derm Problem       Initial /66  Pulse 75  Temp 97.5  F (36.4  C) (Oral)  Wt 137 lb 6.4 oz (62.3 kg)  SpO2 99%  BMI 22.18 kg/m2 Estimated body mass index is 22.18 kg/(m^2) as calculated from the following:    Height as of 7/28/17: 5' 6\" (1.676 m).    Weight as of this encounter: 137 lb 6.4 oz (62.3 kg).  Medication Reconciliation: complete     Jalen Hopkins MA      "
no

## 2017-11-22 NOTE — MR AVS SNAPSHOT
After Visit Summary   11/22/2017    Josette Palacios    MRN: [id number]           Patient Information     Date Of Birth          1955        Visit Information        Provider Department      11/22/2017 4:00 PM Ishaan West MD [facility]        Today's Diagnoses     Itching    -  1    Screen for colon cancer        Visit for screening mammogram        Need for prophylactic vaccination and inoculation against influenza        Narcotic dependence (H)        Type 2 diabetes mellitus without complication, without long-term current use of insulin (H)        Neurodermatitis          Care Instructions    -Take over the counter zyrtec once a day, and apply the bactroban and over the counter diphenhydramine cream to the affected area to help with the itching.  -Please return in 1 month for a recheck.  -If the rash gets swollen and tender or if you develop a fever, please let me know or go to the emergency room so that you can be started on antibiotics.          Follow-ups after your visit        Future tests that were ordered for you today     Open Future Orders        Priority Expected Expires Ordered    MA SCREENING DIGITAL BILAT - Future  (s+30) Routine  11/21/2018 11/22/2017    Fecal colorectal cancer screen FIT - Future (S+30) Routine 12/12/2017 12/21/2017 11/22/2017            Who to contact     If you have questions or need follow up information about today's clinic visit or your schedule please contact [facility] [place] at [phone].  Normal or non-critical lab and imaging results will be communicated to you by MyChart, letter or phone within 4 business days after the clinic has received the results. If you do not hear from us within 7 days, please contact the clinic through TagManhart or phone. If you have a critical or abnormal lab result, we will notify you by phone as soon as possible.  Submit refill requests through Imagiin. or call your pharmacy and  they will forward the refill request to us. Please allow 3 business days for your refill to be completed.          Additional Information About Your Visit        Asur[employer]erve Information     [employer] gives you secure access to your electronic health record. If you see a primary care provider, you can also send messages to your care team and make appointments. If you have questions, please call your primary care clinic.  If you do not have a primary care provider, please call [phone] and they will assist you.        Care EveryWhere ID     This is your Care EveryWhere ID. This could be used by other organizations to access your [place] medical records  [id number]        Your Vitals Were     Pulse Temperature Pulse Oximetry BMI (Body Mass Index)          75 97.5  F (36.4  C) (Oral) 99% 22.18 kg/m2         Blood Pressure from Last 3 Encounters:   11/22/17 144/66   07/28/17 115/64   05/26/17 118/74    Weight from Last 3 Encounters:   11/22/17 137 lb 6.4 oz (62.3 kg)   07/28/17 144 lb (65.3 kg)   05/26/17 139 lb 6.4 oz (63.2 kg)              We Performed the Following     Albumin Random Urine Quantitative with Creat Ratio     Comprehensive metabolic panel     HEMOGLOBIN A1C     Lipid panel reflex to direct LDL Fasting     TSH WITH FREE T4 REFLEX          Today's Medication Changes          These changes are accurate as of: 11/22/17  4:50 PM.  If you have any questions, ask your nurse or doctor.               Start taking these medicines.        Dose/Directions    mupirocin 2 % cream   Commonly known as:  BACTROBAN   Used for:  Neurodermatitis   Started by:  Ishaan West MD        Apply topically 3 times daily   Quantity:  30 g   Refills:  1         These medicines have changed or have updated prescriptions.        Dose/Directions    * buprenorphine-naloxone 2-0.5 MG Subl sublingual tablet   Commonly known as:  SUBOXONE   This may have changed:  Another medication with the same name was changed. Make sure you  understand how and when to take each.   Used for:  Narcotic dependence (H)   Changed by:  Ishaan West MD        PLACE 1 TABLET UNDER THE TONGUE TWICE DAILY   Quantity:  60 tablet   Refills:  0       * buprenorphine-naloxone 2-0.5 MG Subl sublingual tablet   Commonly known as:  SUBOXONE   This may have changed:  See the new instructions.   Used for:  Narcotic dependence (H)   Changed by:  Ishaan West MD        PLACE 1 TABLET UNDER THE TONGUE 2 TIMES DAILY.   Quantity:  60 tablet   Refills:  0       * Notice:  This list has 2 medication(s) that are the same as other medications prescribed for you. Read the directions carefully, and ask your doctor or other care provider to review them with you.         Where to get your medicines      These medications were sent to [facility] [address] [facility] [address] [facility] [address] [facility]  [address] [facility] [address]     Phone:  [phone]     mupirocin 2 % cream         Some of these will need a paper prescription and others can be bought over the counter.  Ask your nurse if you have questions.     Bring a paper prescription for each of these medications     buprenorphine-naloxone 2-0.5 MG Subl sublingual tablet                Primary Care Provider Office Phone # Fax #    Ishaan West [phone]       [address]  [facility] [address]        Equal Access to Services     [facility]: Hadii joey ku hadasho Soomaali, waaxda luqadaha, qaybta kaalmada adeegyada, mack garcia . So [facility] [phone].    ATENCIÓN: Si habla español, tiene a cho disposición servicios gratuitos de asistencia lingüística. Llame al [phone].    We comply with applicable federal civil rights laws and [place] laws. We do not discriminate on the basis of race, color, national origin, age, disability, sex, sexual orientation, or gender identity.             Thank you!     Thank you for choosing [facility]  for your care. Our goal is always to provide you with excellent care. Hearing back from our patients is one way we can continue to improve our services. Please take a few minutes to complete the written survey that you may receive in the mail after your visit with us. Thank you!             Your Updated Medication List - Protect others around you: Learn how to safely use, store and throw away your medicines at www.disposemymeds.org.          This list is accurate as of: 11/22/17  4:50 PM.  Always use your most recent med list.                   Brand Name Dispense Instructions for use Diagnosis    ACE/ARB/ARNI NOT PRESCRIBED (INTENTIONAL)      ACE & ARB not prescribed due to Refusal by patient        blood glucose monitoring test strip    ACCU-CHEK CHASE    100 each    Use to test blood sugar 3 times daily or as directed.    Hyperglycemia       * buprenorphine-naloxone 2-0.5 MG Subl sublingual tablet    SUBOXONE    60 tablet    PLACE 1 TABLET UNDER THE TONGUE TWICE DAILY    Narcotic dependence (H)       * buprenorphine-naloxone 2-0.5 MG Subl sublingual tablet    SUBOXONE    60 tablet    PLACE 1 TABLET UNDER THE TONGUE 2 TIMES DAILY.    Narcotic dependence (H)       clindamycin 150 MG capsule    CLEOCIN    30 capsule    Take 1 capsule (150 mg) by mouth 3 times daily    Cellulitis of other specified site       DULoxetine 20 MG EC capsule    CYMBALTA    180 capsule    TAKE 1 CAPSULE BY MOUTH TWICE DAILY    Anxiety associated with depression       glipiZIDE 5 MG tablet    GLUCOTROL    60 tablet    Take 1 tablet (5 mg) by mouth 2 times daily (before meals)    Type 2 diabetes mellitus without complication (H)       lactulose encephalopathy 10 GM/15ML SOLUTION    CHRONULAC    5700 mL    Take 45 mLs (30 g) by mouth 3 times daily    Chronic hepatitis C (H), Hepatic encephalopathy (H), Alcoholic cirrhosis of liver (H)       metFORMIN 500 MG tablet     GLUCOPHAGE    180 tablet    Take 1 tablet (500 mg) by mouth 2 times daily (with meals)    Type 2 diabetes mellitus without complication, without long-term current use of insulin (H)       metoprolol 25 MG 24 hr tablet    TOPROL-XL    90 tablet    Take 1 tablet (25 mg) by mouth At Bedtime    Hypertension, benign essential, goal below 140/90       mupirocin 2 % cream    BACTROBAN    30 g    Apply topically 3 times daily    Neurodermatitis       nicotine polacrilex 4 MG lozenge    RA MINI NICOTINE    360 tablet    Place 1 lozenge (4 mg) inside cheek as needed for smoking cessation    Cigarette nicotine dependence without complication       order for DME     1 Device    Equipment being ordered: Home blood pressure cuff    Hypertension, benign essential, goal below 140/90       pramipexole 0.125 MG tablet    MIRAPEX    30 tablet    TAKE 1 TABLET(0.125 MG) BY MOUTH AT BEDTIME    Restless legs syndrome (RLS)       sertraline 50 MG tablet    ZOLOFT    135 tablet    Take 1.5 tablets (75 mg) by mouth daily    Anxiety associated with depression       sulfamethoxazole-trimethoprim 800-160 MG per tablet    BACTRIM DS/SEPTRA DS    20 tablet    Take 1 tablet by mouth 2 times daily    Cellulitis of other specified site       * traMADol 50 MG tablet    ULTRAM    28 tablet    0.5  to 1 tab once daily as needed for severe pain        * traMADol 50 MG tablet    ULTRAM    28 tablet    TAKE 1 TABLET BY MOUTH TWICE DAILY AS NEEDED FOR MODERATE PAIN    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  Use sunscreen SPF>20.    Age-related facial wrinkles       valACYclovir 500 MG tablet    VALTREX    20 tablet    TAKE  ONE TABLET BY MOUTH TWICE DAILY AS NEEDED    Encounter for routine gynecological examination       varenicline 0.5 MG X 11 & 1 MG X 42 tablet    CHANTIX STARTING MONTH BYRON    53 tablet    Take 0.5 mg tab daily for 3 days, then 0.5 mg tab twice daily for 4 days, then 1 mg twice daily.    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 Read the directions carefully, and ask your doctor or other care provider to review them with you.

## 2017-11-24 LAB — T4 FREE SERPL-MCNC: 0.98 NG/DL (ref 0.76–1.46)

## 2017-11-26 NOTE — PROGRESS NOTES
Haroldo Nathan: Your recent results show renewed liver inflammation- recommend reducing, then eliminating alcohol to avoid permanent liver damage/ failure. Also there is more kidney damage- increased albumen lost in the urine. Reducing blood pressure and stopping alcohol are critical. Recommend 1 month followup appointment. Contact if questions in the meantime, or if help needed if unable to reduce/stop alcohol use.    Ishaan

## 2017-12-04 ENCOUNTER — TELEPHONE (OUTPATIENT)
Dept: FAMILY MEDICINE | Facility: CLINIC | Age: 62
End: 2017-12-04

## 2017-12-04 DIAGNOSIS — R09.81 SINUS CONGESTION: ICD-10-CM

## 2017-12-04 RX ORDER — AMOXICILLIN 500 MG/1
500 CAPSULE ORAL 3 TIMES DAILY
Qty: 30 CAPSULE | Refills: 0 | Status: SHIPPED | OUTPATIENT
Start: 2017-12-04 | End: 2017-12-19

## 2017-12-04 NOTE — TELEPHONE ENCOUNTER
Dr. West,     Patient called to say she has cold symptoms. Offered several appointment options as well as an e-visit, she requested a message be sent to you. She reports the following symptoms:     Congestion  Coughing up phlegm   Fatigue  No fever    Home care (increased fluid intake, rest, humidified air) discussed with patient, she reports her understanding. She is requesting an antibiotic.     Keyona Cordero RN  [facility]

## 2017-12-04 NOTE — TELEPHONE ENCOUNTER
Reason for call:  Other   Patient called regarding (reason for call): prescription  Additional comments: Pt saw Dr. West on 11/22 for a few issues, including a cold she had for some time. She stated that she thought she was on the mend, but is actually getting worse and worse. She would like to know if Dr. West could call in a script for an antibiotic. Pharmacy is queued.       Phone number to reach patient:  Home number on file [phone] (home)    Best Time:  Any    Can we leave a detailed message on this number?  YES

## 2017-12-19 DIAGNOSIS — G25.81 RESTLESS LEGS SYNDROME (RLS): ICD-10-CM

## 2017-12-19 DIAGNOSIS — R09.81 SINUS CONGESTION: ICD-10-CM

## 2017-12-19 NOTE — TELEPHONE ENCOUNTER
pramipexole (MIRAPEX) 0.125 MG tablet      Last Written Prescription Date:  11/13/17  Last Fill Quantity: 30,   # refills: 0  Last Office Visit: 11/22/16  Future Office visit:       Routing refill request to provider for review/approval because:  Does not meet protocol criteria        amoxicillin (AMOXIL) 500 MG capsule      Last Written Prescription Date:  12/4/17  Last Fill Quantity: 30,   # refills: 0  Last Office Visit: 11/22/16  Future Office visit:       Routing refill request to provider for review/approval because:  Does not meet protocol criteria

## 2017-12-20 RX ORDER — AMOXICILLIN 500 MG/1
CAPSULE ORAL
Qty: 30 CAPSULE | Refills: 0 | Status: SHIPPED | OUTPATIENT
Start: 2017-12-20 | End: 2018-04-27

## 2017-12-20 RX ORDER — PRAMIPEXOLE DIHYDROCHLORIDE 0.12 MG/1
TABLET ORAL
Qty: 30 TABLET | Refills: 0 | Status: SHIPPED | OUTPATIENT
Start: 2017-12-20 | End: 2018-02-24

## 2017-12-20 NOTE — TELEPHONE ENCOUNTER
Routing refill request to provider for review/approval because:  Amoxicillin failed protocol  Mirapex. BP    Shelli Live RN   [facility]

## 2017-12-20 NOTE — TELEPHONE ENCOUNTER
Huddle with Dr. West    He gave verbal ok for refills of medication for 1 month    Shelli Live RN   [facility] [place][note 2]

## 2018-01-03 DIAGNOSIS — F11.20 NARCOTIC DEPENDENCE (H): ICD-10-CM

## 2018-01-03 RX ORDER — BUPRENORPHINE HYDROCHLORIDE AND NALOXONE HYDROCHLORIDE DIHYDRATE 2; .5 MG/1; MG/1
TABLET SUBLINGUAL
Qty: 60 TABLET | Refills: 0 | Status: SHIPPED | OUTPATIENT
Start: 2018-01-03 | End: 2018-02-16

## 2018-01-03 NOTE — TELEPHONE ENCOUNTER
BUPRENORPHINE/NALOX 2MG/0.5MG SL TB        Last Written Prescription Date:  11/22/17  Last Fill Quantity: 60,   # refills: 0  Last Office Visit: 11/22/17  Future Office visit:       Routing refill request to provider for review/approval because:  Drug not on the [facility], [facility] or [facility] refill protocol or controlled substance

## 2018-01-03 NOTE — TELEPHONE ENCOUNTER
Script was faxed to the [facility].

## 2018-01-10 NOTE — PROGRESS NOTES
SUBJECTIVE:   Josette Palacios is a 62 year old female who presents to clinic today, with her , for the following health issues:    Suboxone followup   Status since last visit:    Since last visit patient has been: doing well.     Intensity:     There has been: no craving.      Suboxone Dose: adequate, taking 1-2 tablets.  Progression of Symptoms:     Cues to use and relapse     Recovery program has been: sporadic.   Accompanying Signs & Symptoms:    Side Effects: none.    Sobriety:     Status: no use since last visit.      Drug Screen: patient willing but screen unnecessary.    Precipitating factors:    Triggers have been: non-existent.   Alleviating factors:    Contact with sponsor has been: regular.     Family and support system has been: helpful.   Other Therapies Tried :     Patient has been going to recovery meetings:sporadically.      She is struggling with sobriety. The longest she has gone without a drink is three days.    Her fracture is causing her to feel depressed which makes her turn to drinking.      Left tibial fracture: She has an appointment on 1/22/18 to follow up on her left tibial fracture. They discussed the possibility of doing another graft, but wanted to give it a couple weeks to see what happens. Patient did not fall causing the screws to break. Denies any recent falls.     Rash:  The rash on her back does not seem to be improving. It is itchy. She has seen some spots on her thighs and forearms. She wears band aids to prevent herself from scratching. She takes Zyrtec and has applied benadryl cream to the rash on her back. Patient has tried shaving her legs, which makes things worse.    Cold sx: For three days around [place] she had bad cold symptoms and was coughing up blood. Patient is no longer coughing up blood. She has not had a chest xray. She is still smoking. Her  notes she was lethargic without energy.       Problem list and histories reviewed & adjusted, as  indicated.  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, unspecified fracture morphology, subsequent encounter     Low vitamin D level     Alcohol dependence, episodic (H)     Encounter for monitoring Suboxone maintenance therapy     Eczema, unspecified type     Past Surgical History:   Procedure Laterality Date     breast implants       ESOPHAGOSCOPY, GASTROSCOPY, DUODENOSCOPY (EGD), COMBINED  1/24/2014    Procedure: COMBINED ESOPHAGOSCOPY, GASTROSCOPY, DUODENOSCOPY (EGD);;  Surgeon: Tony Moctezuma MD;  Location:  GI     ORTHOPEDIC SURGERY      TKR left       Social History   Substance Use Topics     Smoking status: Current Every Day Smoker     Packs/day: 0.50     Years: 25.00     Types: Cigarettes     Smokeless tobacco: Never Used     Alcohol use No     Family History   Problem Relation Age of Onset     CANCER Father          Current Outpatient Prescriptions   Medication Sig Dispense Refill     buprenorphine-naloxone (SUBOXONE) 2-0.5 MG SUBL sublingual tablet PLACE 1 TABLET UNDER TONGUE TWICE DAILY 60 tablet 0     pramipexole (MIRAPEX) 0.125 MG tablet TAKE 1 TABLET(0.125 MG) BY MOUTH AT BEDTIME 30 tablet 0     amoxicillin (AMOXIL) 500 MG capsule TAKE 1 CAPSULE(500 MG) BY MOUTH THREE TIMES DAILY 30 capsule 0     mupirocin (BACTROBAN) 2 % cream Apply topically 3 times daily 30 g 1     DULoxetine (CYMBALTA) 20 MG EC capsule TAKE 1 CAPSULE BY MOUTH TWICE DAILY 180 capsule 2     traZODone (DESYREL) 50 MG tablet TAKE 1  TABLET(50 MG) BY MOUTH EVERY EVENING AS NEEDED FOR SLEEP 90 tablet 0     buprenorphine-naloxone (SUBOXONE) 2-0.5 MG SUBL sublingual tablet PLACE 1 TABLET UNDER THE TONGUE TWICE DAILY 60 tablet 0     VITAMIN D, CHOLECALCIFEROL, PO Take 5,000 Units by mouth daily       sertraline (ZOLOFT) 50 MG tablet Take 1.5 tablets (75 mg) by mouth daily 135 tablet 1     valACYclovir (VALTREX) 500 MG tablet TAKE ONE TABLET BY MOUTH TWICE DAILY AS NEEDED 20 tablet 0     traMADol (ULTRAM) 50 MG tablet TAKE 1 TABLET BY MOUTH TWICE DAILY AS NEEDED FOR MODERATE PAIN 28 tablet 0     metoprolol (TOPROL-XL) 25 MG 24 hr tablet Take 1 tablet (25 mg) by mouth At Bedtime 90 tablet 3     varenicline (CHANTIX STARTING MONTH PAK) 0.5 MG X 11 & 1 MG X 42 tablet Take 0.5 mg tab daily for 3 days, then 0.5 mg tab twice daily for 4 days, then 1 mg twice daily. 53 tablet 0     nicotine polacrilex (RA MINI NICOTINE) 4 MG lozenge Place 1 lozenge (4 mg) inside cheek as needed for smoking cessation 360 tablet 1     traMADol (ULTRAM) 50 MG tablet 0.5  to 1 tab once daily as needed for severe pain 28 tablet 0     blood glucose monitoring (ACCU-CHEK CHASE) test strip Use to test blood sugar 3 times daily or as directed. 100 each 5     clindamycin (CLEOCIN) 150 MG capsule Take 1 capsule (150 mg) by mouth 3 times daily 30 capsule 0     sulfamethoxazole-trimethoprim (BACTRIM DS,SEPTRA DS) 800-160 MG per tablet Take 1 tablet by mouth 2 times daily 20 tablet 0     order for DME Equipment being ordered: Home blood pressure cuff 1 Device 0     lactulose encephalopathy (CHRONULAC) 10 GM/15ML SOLUTION Take 45 mLs (30 g) by mouth 3 times daily 5700 mL 2     tretinoin (RETIN-A) 0.025 % cream Spread a pea size amount into affected area topically at bedtime.  Use sunscreen SPF>20. 60 g 3     ACE/ARB NOT PRESCRIBED, INTENTIONAL, ACE & ARB not prescribed due to Refusal by patient       No Known Allergies  Recent Labs   Lab Test  11/22/17   1600  02/15/17   1123 08/05/16  08/04/16 07/28/16   1146   04/11/16   1152   04/15/15   1516   A1C  5.5  5.7  4.9   --    --    < >   --    < >  5.7   LDL  31   --    --    --   41   --    --    --   28   HDL  100   --    --    --   36*   --    --    --   113   TRIG  67   --    --    --   86   --    --    --   57   ALT  36   --    --   61   --    --   60*   < >  84*   CR  0.98   --    --   1.02   --    --   0.60   < >  1.39*   GFRESTIMATED  57*   --    --   55   --    --   >90  Non  GFR Calc     < >  39*   GFRESTBLACK  69   --    --   >60   --    --   >90   GFR Calc     < >  47*   POTASSIUM  4.2   --    --   4.2   --    --   4.8   < >  3.7   TSH  8.04*   --    --    --    --    --    --    --   6.34*    < > = values in this interval not displayed.      BP Readings from Last 3 Encounters:   01/12/18 180/90   11/22/17 144/66   07/28/17 115/64    Wt Readings from Last 3 Encounters:   01/12/18 61.7 kg (136 lb)   11/22/17 62.3 kg (137 lb 6.4 oz)   07/28/17 65.3 kg (144 lb)         Labs reviewed in EPIC  Reviewed and updated as needed this visit by clinical staffTobacco  Allergies  Meds       Reviewed and updated as needed this visit by Provider         ROS:  Positive for left tibial fracture and rash on her back, forearms, and thighs.     Denies headache, insomnia, chest pain, shortness of breath, cough, heartburn, bowel issues, bladder issues, neck pain, back pain, hip pain, knee pain, ankle pain, or foot pain. Remainder of ROS is negative unless otherwise noted above or in HPI.    This document serves as a record of the services and decisions personally performed and made by Ishaan West MD. It was created on his/her behalf by Viviane Lundberg, trained medical scribe. The creation of this document is based the provider's statements to the medical scribes.    Scribe Viviane Lundberg 9:34 AM, January 12, 2018  OBJECTIVE:     /90 (BP Location: Left arm)  Pulse 80  Temp 97.6  F (36.4  C) (Oral)  Resp 14   Wt 61.7 kg (136 lb)  SpO2 98%  BMI 21.95 kg/m2  Body mass index is 21.95 kg/(m^2).  GENERAL: healthy, alert and no distress  RESP: lungs clear to auscultation - no rales, rhonchi or wheezes  CV: regular rate and rhythm, normal S1 S2, no S3 or S4, no murmur, click or rub, no peripheral edema and peripheral pulses strong  SKIN: Excoriated, papule, squamous rash, on the RUQ of the back. Ecchymosis on the bilateral forearms, with band aids as well.  MS: external fixator on her left lower extremity, one site slight drainage, but no obvious infection.   PSYCH: mentation appears normal, affect normal/bright    Diagnostic Test Results:  No results found for this or any previous visit (from the past 24 hour(s)).     ASSESSMENT/PLAN:   (F10.306) Alcohol dependence with other alcohol-induced disorder (H)  (primary encounter diagnosis)  Comment: Pt is still drinking and has not been sober for longer than three days.  Plan: MENTAL HEALTH REFERRAL  - Adult; Assessments         and Testing; Chemical Health Assessment; FV: CD        Services [phone]; Patient call to         schedule, Ammonia, Hepatic panel        Will check ammonia levels and liver function today. Continue on Suboxone one tablet bid. Highly recommended that she schedule with mental health for a chemical assessment for further evaluation and treatment to get her to sobriety.    (Z51.81,  Z79.940) Encounter for monitoring Suboxone maintenance therapy  Comment: Stable, on 1-2 tablets daily.  Plan: MENTAL HEALTH REFERRAL  - Adult; Assessments         and Testing; Chemical Health Assessment; FV: CD        Services [phone]; Patient call to         schedule        Continue Suboxone. See above.    (L30.9) Eczema, unspecified type  Comment: Patches of itchy skin on her back, bilateral forearms, and legs. She is taking Zyrtec with minimal relief.  Plan: Continue Zyrtec, start a daily Claritin in addition, and continue applying Benadryl cream bid to try and  relieve the itch.      Patient Instructions   You can take Claritin in addition to Zyrtec.  Try applying Benadryl Cream twice a day to relieve the itch.    Continue to cut back on drinking.  Call the number for chemical assessment.       The information in this document, created by the medical scribe for me, accurately reflects the services I personally performed and the decisions made by me. I have reviewed and approved this document for accuracy prior to leaving the patient care area.  Ishaan West MD  9:34 AM, 01/12/18    Ishaan West MD  [facility] [place]

## 2018-01-11 PROBLEM — Z79.899 ENCOUNTER FOR MONITORING SUBOXONE MAINTENANCE THERAPY: Status: ACTIVE | Noted: 2018-01-11

## 2018-01-11 PROBLEM — Z79.891 ENCOUNTER FOR MONITORING SUBOXONE MAINTENANCE THERAPY: Status: ACTIVE | Noted: 2018-01-11

## 2018-01-11 PROBLEM — Z51.81 ENCOUNTER FOR MONITORING SUBOXONE MAINTENANCE THERAPY: Status: ACTIVE | Noted: 2018-01-11

## 2018-01-11 RX ORDER — GLIPIZIDE 5 MG/1
5 TABLET ORAL
Qty: 60 TABLET | Refills: 3 | Status: CANCELLED | OUTPATIENT
Start: 2018-01-11

## 2018-01-12 ENCOUNTER — OFFICE VISIT (OUTPATIENT)
Dept: FAMILY MEDICINE | Facility: CLINIC | Age: 63
End: 2018-01-12
Payer: COMMERCIAL

## 2018-01-12 VITALS
BODY MASS INDEX: 21.95 KG/M2 | HEART RATE: 80 BPM | TEMPERATURE: 97.6 F | RESPIRATION RATE: 14 BRPM | OXYGEN SATURATION: 98 % | WEIGHT: 136 LBS | SYSTOLIC BLOOD PRESSURE: 180 MMHG | DIASTOLIC BLOOD PRESSURE: 90 MMHG

## 2018-01-12 DIAGNOSIS — Z79.899 ENCOUNTER FOR MONITORING SUBOXONE MAINTENANCE THERAPY: ICD-10-CM

## 2018-01-12 DIAGNOSIS — Z51.81 ENCOUNTER FOR MONITORING SUBOXONE MAINTENANCE THERAPY: ICD-10-CM

## 2018-01-12 DIAGNOSIS — L30.9 ECZEMA, UNSPECIFIED TYPE: ICD-10-CM

## 2018-01-12 DIAGNOSIS — F10.288 ALCOHOL DEPENDENCE WITH OTHER ALCOHOL-INDUCED DISORDER (H): Primary | ICD-10-CM

## 2018-01-12 LAB
ALBUMIN SERPL-MCNC: 3.2 G/DL (ref 3.4–5)
ALP SERPL-CCNC: 157 U/L (ref 40–150)
ALT SERPL W P-5'-P-CCNC: 50 U/L (ref 0–50)
AMMONIA PLAS-SCNC: 50 UMOL/L (ref 10–50)
AST SERPL W P-5'-P-CCNC: 156 U/L (ref 0–45)
BILIRUB DIRECT SERPL-MCNC: 0.4 MG/DL (ref 0–0.2)
BILIRUB SERPL-MCNC: 1 MG/DL (ref 0.2–1.3)
PROT SERPL-MCNC: 9.3 G/DL (ref 6.8–8.8)

## 2018-01-12 PROCEDURE — 36415 COLL VENOUS BLD VENIPUNCTURE: CPT | Performed by: FAMILY MEDICINE

## 2018-01-12 PROCEDURE — 99214 OFFICE O/P EST MOD 30 MIN: CPT | Performed by: FAMILY MEDICINE

## 2018-01-12 PROCEDURE — 80076 HEPATIC FUNCTION PANEL: CPT | Performed by: FAMILY MEDICINE

## 2018-01-12 PROCEDURE — 82140 ASSAY OF AMMONIA: CPT | Performed by: FAMILY MEDICINE

## 2018-01-12 NOTE — MR AVS SNAPSHOT
After Visit Summary   1/12/2018    Josette Palacios    MRN: [id number]           Patient Information     Date Of Birth          1955        Visit Information        Provider Department      1/12/2018 9:00 AM Ishaan West MD [facility]        Today's Diagnoses     Alcohol dependence with other alcohol-induced disorder (H)    -  1    Encounter for monitoring Suboxone maintenance therapy        Eczema, unspecified type          Care Instructions    You can take Claritin in addition to Zyrtec.  Try applying Benadryl Cream twice a day to relieve the itch.    Continue to cut back on drinking.  Call the number for chemical assessment.           Follow-ups after your visit        Additional Services     MENTAL HEALTH REFERRAL  - Adult; Assessments and Testing; Chemical Health Assessment; [facility]: CD Services [phone]; Patient call to schedule       All scheduling is subject to the client's specific insurance plan & benefits, provider/location availability, and provider clinical specialities.  Please arrive 15 minutes early for your first appointment and bring your completed paperwork.    Please be aware that coverage of these services is subject to the terms and limitations of your health insurance plan.  Call member services at your health plan with any benefit or coverage questions.                            Who to contact     If you have questions or need follow up information about today's clinic visit or your schedule please contact [facility] directly at [phone].  Normal or non-critical lab and imaging results will be communicated to you by MyChart, letter or phone within 4 business days after the clinic has received the results. If you do not hear from us within 7 days, please contact the clinic through MyChart or phone. If you have a critical or abnormal lab result, we will notify you by phone as soon as possible.  Submit refill requests through  [employer] or call your pharmacy and they will forward the refill request to us. Please allow 3 business days for your refill to be completed.          Additional Information About Your Visit        [employer] Information     [employer] gives you secure access to your electronic health record. If you see a primary care provider, you can also send messages to your care team and make appointments. If you have questions, please call your primary care clinic.  If you do not have a primary care provider, please call [phone] and they will assist you.        Care EveryWhere ID     This is your Care EveryWhere ID. This could be used by other organizations to access your [facility] medical records  [id number]        Your Vitals Were     Pulse Temperature Respirations Pulse Oximetry BMI (Body Mass Index)       80 97.6  F (36.4  C) (Oral) 14 98% 21.95 kg/m2        Blood Pressure from Last 3 Encounters:   01/12/18 180/90   11/22/17 144/66   07/28/17 115/64    Weight from Last 3 Encounters:   01/12/18 136 lb (61.7 kg)   11/22/17 137 lb 6.4 oz (62.3 kg)   07/28/17 144 lb (65.3 kg)              We Performed the Following     Ammonia     Hepatic panel     MENTAL HEALTH REFERRAL  - Adult; Assessments and Testing; Chemical Health Assessment; [facility]: CD Services [phone]; Patient call to schedule          Today's Medication Changes          These changes are accurate as of: 1/12/18  9:47 AM.  If you have any questions, ask your nurse or doctor.               Stop taking these medicines if you haven't already. Please contact your care team if you have questions.     glipiZIDE 5 MG tablet   Commonly known as:  GLUCOTROL   Stopped by:  Ishaan West MD                    Primary Care Provider Office Phone # Fax #    Ishaan West [phone]       [address] [place], [place] [address] [address]        Equal Access to Services     ROSEANN MORALES : brijesh Laurent qaybta  mack arceojesus garcia ah. [facility] [phone].    ATENCIÓN: Si ronak jensen, tiene a cho disposición servicios gratuitos de asistencia lingüística. Gregg al [phone].    We comply with applicable federal civil rights laws and [place] laws. We do not discriminate on the basis of race, color, national origin, age, disability, sex, sexual orientation, or gender identity.            Thank you!     Thank you for choosing [facility]  for your care. Our goal is always to provide you with excellent care. Hearing back from our patients is one way we can continue to improve our services. Please take a few minutes to complete the written survey that you may receive in the mail after your visit with us. Thank you!             Your Updated Medication List - Protect others around you: Learn how to safely use, store and throw away your medicines at www.disposemymeds.org.          This list is accurate as of: 1/12/18  9:47 AM.  Always use your most recent med list.                   Brand Name Dispense Instructions for use Diagnosis    ACE/ARB/ARNI NOT PRESCRIBED (INTENTIONAL)      ACE & ARB not prescribed due to Refusal by patient        amoxicillin 500 MG capsule    AMOXIL    30 capsule    TAKE 1 CAPSULE(500 MG) BY MOUTH THREE TIMES DAILY    Sinus congestion       blood glucose monitoring test strip    ACCU-CHEK CHASE    100 each    Use to test blood sugar 3 times daily or as directed.    Hyperglycemia       * buprenorphine-naloxone 2-0.5 MG Subl sublingual tablet    SUBOXONE    60 tablet    PLACE 1 TABLET UNDER THE TONGUE TWICE DAILY    Narcotic dependence (H)       * buprenorphine-naloxone 2-0.5 MG Subl sublingual tablet    SUBOXONE    60 tablet    PLACE 1 TABLET UNDER TONGUE TWICE DAILY    Narcotic dependence (H)       clindamycin 150 MG capsule    CLEOCIN    30 capsule    Take 1 capsule (150 mg) by mouth 3 times daily    Cellulitis of other specified site        DULoxetine 20 MG EC capsule    CYMBALTA    180 capsule    TAKE 1 CAPSULE BY MOUTH TWICE DAILY    Anxiety associated with depression       lactulose encephalopathy 10 GM/15ML SOLUTION    CHRONULAC    5700 mL    Take 45 mLs (30 g) by mouth 3 times daily    Chronic hepatitis C (H), Hepatic encephalopathy (H), Alcoholic cirrhosis of liver (H)       metoprolol succinate 25 MG 24 hr tablet    TOPROL-XL    90 tablet    Take 1 tablet (25 mg) by mouth At Bedtime    Hypertension, benign essential, goal below 140/90       mupirocin 2 % cream    BACTROBAN    30 g    Apply topically 3 times daily    Neurodermatitis       nicotine polacrilex 4 MG lozenge    RA MINI NICOTINE    360 tablet    Place 1 lozenge (4 mg) inside cheek as needed for smoking cessation    Cigarette nicotine dependence without complication       order for DME     1 Device    Equipment being ordered: Home blood pressure cuff    Hypertension, benign essential, goal below 140/90       pramipexole 0.125 MG tablet    MIRAPEX    30 tablet    TAKE 1 TABLET(0.125 MG) BY MOUTH AT BEDTIME    Restless legs syndrome (RLS)       sertraline 50 MG tablet    ZOLOFT    135 tablet    Take 1.5 tablets (75 mg) by mouth daily    Anxiety associated with depression       sulfamethoxazole-trimethoprim 800-160 MG per tablet    BACTRIM DS/SEPTRA DS    20 tablet    Take 1 tablet by mouth 2 times daily    Cellulitis of other specified site       * traMADol 50 MG tablet    ULTRAM    28 tablet    0.5  to 1 tab once daily as needed for severe pain        * traMADol 50 MG tablet    ULTRAM    28 tablet    TAKE 1 TABLET BY MOUTH TWICE DAILY AS NEEDED FOR MODERATE PAIN    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  Use sunscreen SPF>20.    Age-related facial wrinkles       valACYclovir 500 MG tablet    VALTREX    20 tablet    TAKE ONE TABLET BY MOUTH TWICE DAILY AS NEEDED    Encounter for routine gynecological examination       varenicline 0.5 MG X 11 & 1 MG X 42 tablet    CHANTIX STARTING MONTH BYRON    53 tablet    Take 0.5 mg tab daily for 3 days, then 0.5 mg tab twice daily for 4 days, then 1 mg twice daily.    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 Read the directions carefully, and ask your doctor or other care provider to review them with you.

## 2018-01-12 NOTE — NURSING NOTE
"Chief Complaint   Patient presents with     Recheck Medication       Initial /90 (BP Location: Left arm)  Pulse 80  Temp 97.6  F (36.4  C) (Oral)  Resp 14  Wt 136 lb (61.7 kg)  SpO2 98%  BMI 21.95 kg/m2 Estimated body mass index is 21.95 kg/(m^2) as calculated from the following:    Height as of 7/28/17: 5' 6\" (1.676 m).    Weight as of this encounter: 136 lb (61.7 kg).  Medication Reconciliation: complete     Ines Rivera CMA      "

## 2018-01-12 NOTE — PATIENT INSTRUCTIONS
You can take Claritin in addition to Zyrtec.  Try applying Benadryl Cream twice a day to relieve the itch.    Continue to cut back on drinking.  Call the number for chemical assessment.

## 2018-01-30 NOTE — PROGRESS NOTES
Haroldo Nathan: Your lab results from earlier this month are consistent with alcohol disease of the liver. Have you set up an appointment to assess for CD assessment and treatment?    Ishaan

## 2018-02-16 ENCOUNTER — TELEPHONE (OUTPATIENT)
Dept: FAMILY MEDICINE | Facility: CLINIC | Age: 63
End: 2018-02-16

## 2018-02-16 DIAGNOSIS — F11.20 NARCOTIC DEPENDENCE (H): ICD-10-CM

## 2018-02-16 NOTE — TELEPHONE ENCOUNTER
"Patient call and says that because of her broken ankle that the cast was just removed from she not suppose to walk on ankle.  She says that she can not come into the clinic to get the prescription for buprenorphine-naloxone (SUBOXONE) 2-0.5 MG SUBL sublingual tablet.  Is hoping that it can be \"call in to pharmacy\" as soon as possible.      [phone] is the best number to call her back and it is okay to leave a detailed message.  "

## 2018-02-16 NOTE — TELEPHONE ENCOUNTER
Writer called patient she is requesting a refill request of medication - discussed this medication can be called to the pharmacy - has taken one tablet today 2/16/2018 and has 6 tablets left - this is enough to get her through half the day Monday 2/19/2018 by taking one tablet Monday morning    Please call patient back upon provider response as she will be out of medication on Monday and will need an update on the plan      Controlled Substance Refill Request for buprenorphine-naloxone (SUBOXONE) 2-0.5 MG SUBL sublingual tablet  Problem List Complete:  Yes    Last Written Prescription Date:  1/3/2018  Last Fill Quantity: 60,   # refills: 0    Last Office Visit with [facility] primary care provider: 1/12/2018    Clinic visit frequency required: unspecified     Future Office visit:     Controlled substance agreement on file: No.     Processing:  please call to pharmacy of choice   checked in past 6 months?  No, route to RN [place]  review writer notes last refill 1/16/2018    Thank you,  Kayy Garcia RN

## 2018-02-18 RX ORDER — BUPRENORPHINE HYDROCHLORIDE AND NALOXONE HYDROCHLORIDE DIHYDRATE 2; .5 MG/1; MG/1
TABLET SUBLINGUAL
Qty: 60 TABLET | Refills: 0 | Status: SHIPPED | OUTPATIENT
Start: 2018-02-18 | End: 2018-06-01

## 2018-02-19 NOTE — TELEPHONE ENCOUNTER
Script was faxed to the [facility] and Venita was notified that this was done. Dedra

## 2018-02-24 ENCOUNTER — TELEPHONE (OUTPATIENT)
Dept: FAMILY MEDICINE | Facility: CLINIC | Age: 63
End: 2018-02-24

## 2018-02-24 DIAGNOSIS — G25.81 RESTLESS LEGS SYNDROME (RLS): ICD-10-CM

## 2018-02-24 NOTE — LETTER
[address] [address]  [phone]          March 8, 2018    Josette Palacios                                                                                                                     [address] [place]  [facility] [address]            Dear Dr. Jenna Finch has approved 2 month supply of you medication pramipexole (MIRAPEX) 0.125 MG tablet back on 2/28/2018.  He is asking you to follow up in clinic with an appointment with the Medical Assistant to check your blood pressure.  This is a no charge appointment.  Future refills may be effected if follow up is not completed.  Please call the clinic to schedule [phone]      Sincerely,         Ishaan West MD

## 2018-02-26 NOTE — TELEPHONE ENCOUNTER
"Requested Prescriptions   Pending Prescriptions Disp Refills     pramipexole (MIRAPEX) 0.125 MG tablet [Pharmacy Med Name: PRAMIPEXOLE 0.125MG TABLETS] 30 tablet 0    Last Written Prescription Date:  12/20/17  Last Fill Quantity: 30,  # refills: 0   Last office visit: 1/12/2018 with prescribing provider:  1/12/18   Future Office Visit:     Sig: TAKE 1 TABLET(0.125 MG) BY MOUTH AT BEDTIME    Antiparkinson's Agents Protocol Failed    2/24/2018 12:19 PM       Failed - Blood pressure under 140/90 in past 12 months    BP Readings from Last 3 Encounters:   01/12/18 180/90   11/22/17 144/66   07/28/17 115/64                Failed - No positive pregnancy test in the past 12 months       Passed - Recent or future visit with authorizing provider's specialty    Patient had office visit in the last year or has a visit in the next 30 days with authorizing provider.  See \"Patient Info\" tab in inbasket, or \"Choose Columns\" in Meds & Orders section of the refill encounter.            Passed - Patient is age 18 or older       Passed - No active pregnancy on record        "

## 2018-02-27 NOTE — TELEPHONE ENCOUNTER
Dr. West--    Please review/sign or advise on Mirapex.     Unable to refill per protocol. BP elevated.    BP Readings from Last 3 Encounters:   01/12/18 180/90   11/22/17 144/66   07/28/17 115/64     Thank you,   Keyona Cordero, BSN RN  [facility]

## 2018-02-28 RX ORDER — PRAMIPEXOLE DIHYDROCHLORIDE 0.12 MG/1
TABLET ORAL
Qty: 30 TABLET | Refills: 1 | Status: SHIPPED | OUTPATIENT
Start: 2018-02-28 | End: 2018-04-27

## 2018-02-28 NOTE — TELEPHONE ENCOUNTER
Left voice message for patient to call clinic.    Need to give patient Dr. West's message.    Suki Andres RN  [facility]'

## 2018-03-06 NOTE — TELEPHONE ENCOUNTER
Left a message with request for return call.     Keyona Cordero, BSN RN  [facility]

## 2018-03-08 NOTE — TELEPHONE ENCOUNTER
Called patient - last call attempt - advised small refill - please schedule appointment with medical assistant - letter sent 3/8/2018     Closing encounter - no further actions needed at this time    Kayy Garcia RN

## 2018-03-21 DIAGNOSIS — F11.20 NARCOTIC DEPENDENCE (H): ICD-10-CM

## 2018-03-21 RX ORDER — BUPRENORPHINE HYDROCHLORIDE AND NALOXONE HYDROCHLORIDE DIHYDRATE 2; .5 MG/1; MG/1
TABLET SUBLINGUAL
Qty: 60 TABLET | Refills: 0 | Status: SHIPPED | OUTPATIENT
Start: 2018-03-21 | End: 2018-04-27

## 2018-03-21 NOTE — TELEPHONE ENCOUNTER
Suboxone last written on 2/18/18 by PCP.     Last seen in clinic 1/12/18. Pt has appt with Dr West scheduled on 3/30/18.         Refill request to MD/primary care provider.  Annalise Mendez RN

## 2018-04-05 DIAGNOSIS — Z01.419 ENCOUNTER FOR ROUTINE GYNECOLOGICAL EXAMINATION: ICD-10-CM

## 2018-04-05 RX ORDER — VALACYCLOVIR HYDROCHLORIDE 500 MG/1
TABLET, FILM COATED ORAL
Qty: 20 TABLET | Refills: 0 | Status: SHIPPED | OUTPATIENT
Start: 2018-04-05 | End: 2018-04-27

## 2018-04-05 NOTE — TELEPHONE ENCOUNTER
"Requested Prescriptions   Pending Prescriptions Disp Refills     valACYclovir (VALTREX) 500 MG tablet [Pharmacy Med Name: VALACYCLOVIR 500MG TABLET] 20 tablet 0    Last Written Prescription Date:  5/4/17  Last Fill Quantity: 20,  # refills: 0   Last office visit: 1/12/2018 with prescribing provider:  1/12/18   Future Office Visit:     Sig: TAKE ONE TABLET BY MOUTH TWICE DAILY AS NEEDED    Antivirals for Herpes Protocol Passed    4/5/2018 11:34 AM       Passed - Patient is age 12 or older       Passed - Recent (12 mo) or future (30 days) visit within the authorizing provider's specialty    Patient had office visit in the last 12 months or has a visit in the next 30 days with authorizing provider or within the authorizing provider's specialty.  See \"Patient Info\" tab in inbasket, or \"Choose Columns\" in Meds & Orders section of the refill encounter.           Passed - Normal serum creatinine on file in past 12 months    Recent Labs   Lab Test  11/22/17   1600   03/10/14   1148   CR  0.98   < >   --    CRPOC   --    --   0.4*    < > = values in this interval not displayed.               "

## 2018-04-05 NOTE — TELEPHONE ENCOUNTER
Prescription approved per [facility] Refill Protocol.    Suki Andres RN  [facility]

## 2018-04-25 ENCOUNTER — TELEPHONE (OUTPATIENT)
Dept: FAMILY MEDICINE | Facility: CLINIC | Age: 63
End: 2018-04-25

## 2018-04-25 NOTE — TELEPHONE ENCOUNTER
Spoke with pt, she will be at appointment as planned    Shelli Live RN   [facility]

## 2018-04-25 NOTE — TELEPHONE ENCOUNTER
Dr. West    Did you want the pt to be seen on Friday or can she do lab only appointment    Labs not cued    Advise     Shelli Live RN   [facility]

## 2018-04-25 NOTE — TELEPHONE ENCOUNTER
Venita's  Carlos called and wanted to let Dr. West know about some of the things that had been going on with Venita lately. She is seeing Dr. West on Friday, but she has had problems with dizziness and has been sleeping a lot. Carlos thought it would be good for Dr. West have a heads up. He can be reached at [phone].

## 2018-04-25 NOTE — TELEPHONE ENCOUNTER
"Dr. West,    Called patient's , Sid, who would like to speak with you over the phone if you have the time today.    Best number to reach: [phone]  Leaves work at 2:45 pm and will then be home and unable to talk.     stated that he wanted to let you know about some things that have going on lately with Josette in hopes that you may have a better plan when they come to appointment tomorrow.    States that patient has been ill since the 04/15/18. Had ER visit on 04/17/18 at [facility]. Patient admitted with dizziness, vomiting, and diarrhea. They stated it looked like she had a  virus.  stated that he thinks that some issues were related to drinking.  was out of town and came home to find that she had been drinking.     states that since that ER visit, patient is off, confused, and sleeping about 20 hours a day. She does not want to talk with him about how she is doing.  stated that \"something is not right.\" Knows that right now she is not drinking.    Per , he believes that she is afraid to talk openly about her issues at office visits with you.     made an appointment for patient to see you tomorrow. Patient doesn't know that he made appointment. When asked if patient will agree to come to appt tomorrow,  stated that he doesn't know.    Per , patient did not agree to going to Mental Health for chemical assessment after referral was given at [facility] 01/12/18.     states that he is at his wits end, and it has been difficult with him having to go to work and worrying about her.    Suki Andres RN  [facility]  "

## 2018-04-25 NOTE — TELEPHONE ENCOUNTER
Patient called and is wanting complete and thorough blood work done instead of her appointment on 04/27/2018.     Please give the patient a call at [phone] and it is okay to leave a message.

## 2018-04-26 NOTE — PROGRESS NOTES
"  SUBJECTIVE:   Josette Palacios is a 63 year old female who presents to clinic today for the following health issues:    Patient was accompanied by her , Carlos Palacios, for parts of the visit.    Dizziness  Onset: 2 weeks. Still a little dizzy \"in my head\"    Description:   Do you feel faint:  No, just in the beginning, didn't dare walk by herself  Does it feel like the surroundings (bed, room) are moving: no   Unsteady/off balance: no   Have you passed out or fallen: no     Intensity: mild    Progression of Symptoms:  improving    Accompanying Signs & Symptoms:  Heart palpitations: Always had these  Nausea, vomiting: no, but last week it was bad  Weakness in arms or legs: no   Fatigue: YES  Vision or speech changes: no   Ringing in ears (Tinnitus): no   Hearing Loss: no     History:   Head trauma/concussion hx: no   Previous similar symptoms: no   Recent bleeding history: no     Precipitating factors:   Worse with activity or head movement: YES- when she first gets up she has to sit for a while, then can get up and move. Will feel a little lightheaded  Any new medications (BP?): no   Alcohol/drug abuse/withdrawal: Takes Suboxone    Alleviating factors:   Does staying in a fixed position give relief:  YES    Therapies Tried and outcome: Nothing    Patient has been having problems with dizziness for the last 2 weeks, and says that she has been feeling tired, nauseous and has had problems with diarrhea. History of alcoholism, though she says she has not had a drink in several months, though her  says it has only been 2 weeks. She says that her dizziness is improving, and she no longer worries about fainting, but that she has to be careful when she first changes position. Patient is recovering from foot surgery and says that she is \"learning to walk again\".    Problem list and histories reviewed & adjusted, as indicated.  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, unspecified fracture morphology, subsequent encounter     Low vitamin D level     Alcohol dependence, episodic (H)     Encounter for monitoring Suboxone maintenance therapy     Eczema, unspecified type     Past Surgical History:   Procedure Laterality Date     breast implants       ESOPHAGOSCOPY, GASTROSCOPY, DUODENOSCOPY (EGD), COMBINED  1/24/2014    Procedure: COMBINED ESOPHAGOSCOPY, GASTROSCOPY, DUODENOSCOPY (EGD);;  Surgeon: Tony Moctezuma MD;  Location:  GI     ORTHOPEDIC SURGERY      TKR left       Social History   Substance Use Topics     Smoking status: Current Every Day Smoker     Packs/day: 0.50     Years: 25.00     Types: Cigarettes     Smokeless tobacco: Never Used     Alcohol use No     Family History   Problem Relation Age of Onset     CANCER Father          Current Outpatient Prescriptions   Medication Sig Dispense Refill     blood glucose monitoring (ACCU-CHEK CHASE) test strip Use to test blood sugar 3 times daily or as directed. 100 each 5     buprenorphine-naloxone (SUBOXONE) 2-0.5 MG SUBL sublingual tablet PLACE 1 TABLET UNDER TONGUE TWICE DAILY 60 tablet 0     buprenorphine-naloxone (SUBOXONE) 2-0.5 MG SUBL sublingual tablet PLACE 1 TABLET UNDER THE TONGUE TWICE DAILY 60 tablet 0     DULoxetine (CYMBALTA) 20 MG EC capsule TAKE 1 CAPSULE BY MOUTH TWICE DAILY 180 capsule 2     metoprolol (TOPROL-XL) 25 MG 24 hr tablet Take 1 tablet (25 mg) by mouth At Bedtime 90 tablet 3     pramipexole (MIRAPEX) 0.125 MG tablet TAKE 1 TABLET(0.125 MG) BY MOUTH AT BEDTIME 30 tablet 1      sertraline (ZOLOFT) 50 MG tablet Take 1.5 tablets (75 mg) by mouth daily 135 tablet 1     traZODone (DESYREL) 50 MG tablet TAKE 1 TABLET(50 MG) BY MOUTH EVERY EVENING AS NEEDED FOR SLEEP 90 tablet 2     ACE/ARB NOT PRESCRIBED, INTENTIONAL, ACE & ARB not prescribed due to Refusal by patient       clindamycin (CLEOCIN) 150 MG capsule Take 1 capsule (150 mg) by mouth 3 times daily (Patient not taking: Reported on 4/27/2018) 30 capsule 0     lactulose encephalopathy (CHRONULAC) 10 GM/15ML SOLUTION Take 45 mLs (30 g) by mouth 3 times daily (Patient not taking: Reported on 4/27/2018) 5700 mL 2     mupirocin (BACTROBAN) 2 % cream Apply topically 3 times daily (Patient not taking: Reported on 4/27/2018) 30 g 1     nicotine polacrilex (RA MINI NICOTINE) 4 MG lozenge Place 1 lozenge (4 mg) inside cheek as needed for smoking cessation (Patient not taking: Reported on 4/27/2018) 360 tablet 1     order for DME Equipment being ordered: Home blood pressure cuff (Patient not taking: Reported on 4/27/2018) 1 Device 0     traMADol (ULTRAM) 50 MG tablet 0.5  to 1 tab once daily as needed for severe pain (Patient not taking: Reported on 4/27/2018) 28 tablet 0     traMADol (ULTRAM) 50 MG tablet TAKE 1 TABLET BY MOUTH TWICE DAILY AS NEEDED FOR MODERATE PAIN (Patient not taking: Reported on 4/27/2018) 28 tablet 0     tretinoin (RETIN-A) 0.025 % cream Spread a pea size amount into affected area topically at bedtime.  Use sunscreen SPF>20. (Patient not taking: Reported on 4/27/2018) 60 g 3     varenicline (CHANTIX STARTING MONTH PAK) 0.5 MG X 11 & 1 MG X 42 tablet Take 0.5 mg tab daily for 3 days, then 0.5 mg tab twice daily for 4 days, then 1 mg twice daily. (Patient not taking: Reported on 4/27/2018) 53 tablet 0     VITAMIN D, CHOLECALCIFEROL, PO Take 5,000 Units by mouth daily       No Known Allergies  Recent Labs   Lab Test  01/12/18   0953  11/22/17   1600  02/15/17   1123 08/05/16 08/04/16 07/28/16   1146   04/15/15   1516   A1C   --  "  5.5  5.7  4.9   --    --    < >  5.7   LDL   --   31   --    --    --   41   --   28   HDL   --   100   --    --    --   36*   --   113   TRIG   --   67   --    --    --   86   --   57   ALT  50  36   --    --   61   --    < >  84*   CR   --   0.98   --    --   1.02   --    < >  1.39*   GFRESTIMATED   --   57*   --    --   55   --    < >  39*   GFRESTBLACK   --   69   --    --   >60   --    < >  47*   POTASSIUM   --   4.2   --    --   4.2   --    < >  3.7   TSH   --   8.04*   --    --    --    --    --   6.34*    < > = values in this interval not displayed.      BP Readings from Last 3 Encounters:   04/27/18 126/60   01/12/18 180/90   11/22/17 144/66    Wt Readings from Last 3 Encounters:   04/27/18 61.6 kg (135 lb 12.8 oz)   01/12/18 61.7 kg (136 lb)   11/22/17 62.3 kg (137 lb 6.4 oz)        Reviewed and updated as needed this visit by clinical staff       Reviewed and updated as needed this visit by Provider         ROS:  Denies headache, insomnia, chest pain, shortness of breath, cough, heartburn, bowel issues, bladder issues, neck pain, back pain, hip pain, knee pain, ankle pain, or foot pain. Remainder of ROS is negative unless otherwise noted above or in HPI.    This document serves as a record of the services and decisions personally performed and made by Ishaan West MD. It was created on his behalf by Karlos Armstrong, a trained medical scribe. The creation of this document is based on the provider's statements to the medical scribe.  Karlos Armstrong 11:51 AM April 27, 2018    OBJECTIVE:     /60  Pulse 103  Temp 98.5  F (36.9  C) (Oral)  Ht 1.676 m (5' 6\")  Wt 61.6 kg (135 lb 12.8 oz)  SpO2 98%  BMI 21.92 kg/m2  Body mass index is 21.92 kg/(m^2).  GENERAL: healthy, alert and no distress  RESP: lungs clear to auscultation - no rales, rhonchi or wheezes  CV: regular rate and rhythm, normal S1 S2, no S3 or S4, no murmur, click or rub, no peripheral edema and peripheral pulses " strong  ABDOMEN: liver is a little over a hand's breadth with ascites below the right costal margin and spleen felt below left costal margin, soft, nontender and bowel sounds normal  NEURO: Normal strength and tone, mentation intact and speech normal, no asterixis  PSYCH: mentation appears normal, affect normal/bright    Diagnostic Test Results:  No results found for this or any previous visit (from the past 24 hour(s)).    ASSESSMENT/PLAN:     (B18.2) Chronic hepatitis C without hepatic coma (H)  (primary encounter diagnosis)  Comment: Worsening. Referral given for gastroenterology. Labs completed today.  Plan: Ammonia, Hepatic panel, INR, GASTROENTEROLOGY         ADULT REF CONSULT ONLY        Follow up with gastroenterology. Follow up with results from lab.    (K70.31) Alcoholic cirrhosis of liver with ascites (H)  Comment: Worsening. Referral given for gastroenterology. Labs completed today.  Plan: Ammonia, Hepatic panel, INR, GASTROENTEROLOGY         ADULT REF CONSULT ONLY        Follow up with gastroenterology. Follow up with results from lab.    (Z51.81,  Z79.899) Encounter for monitoring Suboxone maintenance therapy  Comment: Stable and unchanged since last visit. Controlled on suboxone. Patient will take 1-2 tablets daily, with the second tablet as needed.  Plan: Continue on current medication. Follow up as needed.    (E11.9) Type 2 diabetes mellitus without complication, without long-term current use of insulin (H)  Comment: Controlled on current medication. Follow up with results from lab.  Plan: HEMOGLOBIN A1C        Continue on current medications. Follow up with results from lab.    (G25.81) Restless legs syndrome (RLS)  Comment: Controlled on pramipexole.  Plan: pramipexole (MIRAPEX) 0.125 MG tablet        Continue on current medication. Follow up as needed.    Patient Instructions   -Follow up with your liver doctor to discuss treatment for your hepatitis C.  -I will let you know when I get the  results back from lab.     45 minutes were spent with the patient with more than 50% of time spent in counseling and coordination of care  The information in this document, created by the medical scribe for me, accurately reflects the services I personally performed and the decisions made by me. I have reviewed and approved this document for accuracy prior to leaving the patient care area.   Ishaan West MD 11:51 AM April 27, 2018  [facility] [place] [facility]

## 2018-04-27 ENCOUNTER — OFFICE VISIT (OUTPATIENT)
Dept: FAMILY MEDICINE | Facility: CLINIC | Age: 63
End: 2018-04-27
Payer: COMMERCIAL

## 2018-04-27 VITALS
BODY MASS INDEX: 21.83 KG/M2 | TEMPERATURE: 98.5 F | DIASTOLIC BLOOD PRESSURE: 60 MMHG | SYSTOLIC BLOOD PRESSURE: 126 MMHG | OXYGEN SATURATION: 98 % | HEIGHT: 66 IN | HEART RATE: 103 BPM | WEIGHT: 135.8 LBS

## 2018-04-27 DIAGNOSIS — B18.2 CHRONIC HEPATITIS C WITHOUT HEPATIC COMA (H): Primary | ICD-10-CM

## 2018-04-27 DIAGNOSIS — E11.9 TYPE 2 DIABETES MELLITUS WITHOUT COMPLICATION, WITHOUT LONG-TERM CURRENT USE OF INSULIN (H): ICD-10-CM

## 2018-04-27 DIAGNOSIS — K70.31 ALCOHOLIC CIRRHOSIS OF LIVER WITH ASCITES (H): ICD-10-CM

## 2018-04-27 DIAGNOSIS — G25.81 RESTLESS LEGS SYNDROME (RLS): ICD-10-CM

## 2018-04-27 DIAGNOSIS — Z79.899 ENCOUNTER FOR MONITORING SUBOXONE MAINTENANCE THERAPY: ICD-10-CM

## 2018-04-27 DIAGNOSIS — Z51.81 ENCOUNTER FOR MONITORING SUBOXONE MAINTENANCE THERAPY: ICD-10-CM

## 2018-04-27 LAB
ALBUMIN SERPL-MCNC: 2.4 G/DL (ref 3.4–5)
ALP SERPL-CCNC: 172 U/L (ref 40–150)
ALT SERPL W P-5'-P-CCNC: 49 U/L (ref 0–50)
AMMONIA PLAS-SCNC: 43 UMOL/L (ref 10–50)
AST SERPL W P-5'-P-CCNC: 61 U/L (ref 0–45)
BILIRUB DIRECT SERPL-MCNC: 0.2 MG/DL (ref 0–0.2)
BILIRUB SERPL-MCNC: 0.5 MG/DL (ref 0.2–1.3)
HBA1C MFR BLD: 5.9 % (ref 0–5.6)
INR PPP: 1.24 (ref 0.86–1.14)
PROT SERPL-MCNC: 7.9 G/DL (ref 6.8–8.8)

## 2018-04-27 PROCEDURE — 85610 PROTHROMBIN TIME: CPT | Performed by: FAMILY MEDICINE

## 2018-04-27 PROCEDURE — 82140 ASSAY OF AMMONIA: CPT | Performed by: FAMILY MEDICINE

## 2018-04-27 PROCEDURE — 36415 COLL VENOUS BLD VENIPUNCTURE: CPT | Performed by: FAMILY MEDICINE

## 2018-04-27 PROCEDURE — 80076 HEPATIC FUNCTION PANEL: CPT | Performed by: FAMILY MEDICINE

## 2018-04-27 PROCEDURE — 83036 HEMOGLOBIN GLYCOSYLATED A1C: CPT | Performed by: FAMILY MEDICINE

## 2018-04-27 PROCEDURE — 99215 OFFICE O/P EST HI 40 MIN: CPT | Performed by: FAMILY MEDICINE

## 2018-04-27 RX ORDER — PRAMIPEXOLE DIHYDROCHLORIDE 0.12 MG/1
TABLET ORAL
Qty: 90 TABLET | Refills: 3 | Status: SHIPPED | OUTPATIENT
Start: 2018-04-27 | End: 2019-07-12

## 2018-04-27 RX ORDER — BUPRENORPHINE HYDROCHLORIDE AND NALOXONE HYDROCHLORIDE DIHYDRATE 2; .5 MG/1; MG/1
TABLET SUBLINGUAL
Qty: 60 TABLET | Refills: 0 | Status: SHIPPED | OUTPATIENT
Start: 2018-04-27 | End: 2018-07-05

## 2018-04-27 NOTE — PATIENT INSTRUCTIONS
-Follow up with your liver doctor to discuss treatment for your hepatitis C.  -I will let you know when I get the results back from lab.

## 2018-04-27 NOTE — MR AVS SNAPSHOT
After Visit Summary   4/27/2018    Josette Palacios    MRN: [id number]           Patient Information     Date Of Birth          1955        Visit Information        Provider Department      4/27/2018 11:30 AM Ishaan West MD [facility]        Today's Diagnoses     Chronic hepatitis C without hepatic coma (H)    -  1    Alcoholic cirrhosis of liver with ascites (H)        Encounter for monitoring Suboxone maintenance therapy        Type 2 diabetes mellitus without complication, without long-term current use of insulin (H)        Restless legs syndrome (RLS)          Care Instructions    -Follow up with your liver doctor to discuss treatment for your hepatitis C.  -I will let you know when I get the results back from lab.          Follow-ups after your visit        Additional Services     GASTROENTEROLOGY ADULT REF CONSULT ONLY       Preferred Location: [facility], [facility] [phone] [place]      Please be aware that coverage of these services is subject to the terms and limitations of your health insurance plan.  Call member services at your health plan with any benefit or coverage questions.  Any procedures must be performed at a [facility] facility OR coordinated by your clinic's referral office.    Please bring the following with you to your appointment:    (1) Any X-Rays, CTs or MRIs which have been performed.  Contact the facility where they were done to arrange for  prior to your scheduled appointment.    (2) List of current medications   (3) This referral request   (4) Any documents/labs given to you for this referral                  Who to contact     If you have questions or need follow up information about today's clinic visit or your schedule please contact [facility] directly at [phone].  Normal or non-critical lab and imaging results will be communicated to you by MyChart, letter or phone within 4 business days after the  "clinic has received the results. If you do not hear from us within 7 days, please contact the clinic through [employer] or phone. If you have a critical or abnormal lab result, we will notify you by phone as soon as possible.  Submit refill requests through [employer] or call your pharmacy and they will forward the refill request to us. Please allow 3 business days for your refill to be completed.          Additional Information About Your Visit        [employer] Information     [employer] gives you secure access to your electronic health record. If you see a primary care provider, you can also send messages to your care team and make appointments. If you have questions, please call your primary care clinic.  If you do not have a primary care provider, please call [phone] and they will assist you.        Care EveryWhere ID     This is your Care EveryWhere ID. This could be used by other organizations to access your [facility] medical records  [id number]        Your Vitals Were     Pulse Temperature Height Pulse Oximetry BMI (Body Mass Index)       103 98.5  F (36.9  C) (Oral) 5' 6\" (1.676 m) 98% 21.92 kg/m2        Blood Pressure from Last 3 Encounters:   04/27/18 126/60   01/12/18 180/90   11/22/17 144/66    Weight from Last 3 Encounters:   04/27/18 135 lb 12.8 oz (61.6 kg)   01/12/18 136 lb (61.7 kg)   11/22/17 137 lb 6.4 oz (62.3 kg)              We Performed the Following     Ammonia     GASTROENTEROLOGY ADULT REF CONSULT ONLY     HEMOGLOBIN A1C     Hepatic panel     INR          Today's Medication Changes          These changes are accurate as of 4/27/18 12:11 PM.  If you have any questions, ask your nurse or doctor.               These medicines have changed or have updated prescriptions.        Dose/Directions    pramipexole 0.125 MG tablet   Commonly known as:  MIRAPEX   This may have changed:  See the new instructions.   Used for:  Restless legs syndrome (RLS)   Changed by:  Ishaan West MD        TAKE 1 " TABLET(0.125 MG) BY MOUTH AT BEDTIME   Quantity:  90 tablet   Refills:  3            Where to get your medicines      These medications were sent to [facility] [address] [facility], [place] - [address]  [address] [facility] [address]     Phone:  [phone]     pramipexole 0.125 MG tablet         Some of these will need a paper prescription and others can be bought over the counter.  Ask your nurse if you have questions.     Bring a paper prescription for each of these medications     buprenorphine-naloxone 2-0.5 MG Subl sublingual tablet                Primary Care Provider Office Phone # Fax #    Ishaan West [phone]       [address]  [facility] [address]        Equal Access to Services     [facility]: Hadii joey thomason hadasho Soomaali, waaxda luqadaha, qaybta kaalmada adeegyada, waxcamilo garcia . So [facility] [phone].    ATENCIÓN: Si habla español, tiene a cho disposición servicios gratuitos de asistencia lingüística. Gregg al [phone].    We comply with applicable federal civil rights laws and [place] laws. We do not discriminate on the basis of race, color, national origin, age, disability, sex, sexual orientation, or gender identity.            Thank you!     Thank you for choosing [facility]  [place] your [place]. Our goal is always to provide you with excellent care. Hearing back from our patients is one way we can continue to improve our services. Please take a few minutes to complete the written survey that you may receive in the mail after your visit with us. Thank you!             Your Updated Medication List - Protect others around you: Learn how to safely use, store and throw away your medicines at www.disposemymeds.org.          This list is accurate as of 4/27/18 12:11 PM.  Always use your most recent med list.                   Brand Name  Dispense Instructions for use Diagnosis    ACE/ARB/ARNI NOT PRESCRIBED (INTENTIONAL)      ACE & ARB not prescribed due to Refusal by patient        blood glucose monitoring test strip    ACCU-CHEK CHASE    100 each    Use to test blood sugar 3 times daily or as directed.    Hyperglycemia       * buprenorphine-naloxone 2-0.5 MG Subl sublingual tablet    SUBOXONE    60 tablet    PLACE 1 TABLET UNDER TONGUE TWICE DAILY    Narcotic dependence (H)       * buprenorphine-naloxone 2-0.5 MG Subl sublingual tablet    SUBOXONE    60 tablet    PLACE 1 TABLET UNDER THE TONGUE TWICE DAILY        clindamycin 150 MG capsule    CLEOCIN    30 capsule    Take 1 capsule (150 mg) by mouth 3 times daily    Cellulitis of other specified site       DULoxetine 20 MG EC capsule    CYMBALTA    180 capsule    TAKE 1 CAPSULE BY MOUTH TWICE DAILY    Anxiety associated with depression       lactulose encephalopathy 10 GM/15ML SOLUTION    CHRONULAC    5700 mL    Take 45 mLs (30 g) by mouth 3 times daily    Chronic hepatitis C (H), Hepatic encephalopathy (H), Alcoholic cirrhosis of liver (H)       metoprolol succinate 25 MG 24 hr tablet    TOPROL-XL    90 tablet    Take 1 tablet (25 mg) by mouth At Bedtime    Hypertension, benign essential, goal below 140/90       mupirocin 2 % cream    BACTROBAN    30 g    Apply topically 3 times daily    Neurodermatitis       nicotine polacrilex 4 MG lozenge    RA MINI NICOTINE    360 tablet    Place 1 lozenge (4 mg) inside cheek as needed for smoking cessation    Cigarette nicotine dependence without complication       order for DME     1 Device    Equipment being ordered: Home blood pressure cuff    Hypertension, benign essential, goal below 140/90       pramipexole 0.125 MG tablet    MIRAPEX    90 tablet    TAKE 1 TABLET(0.125 MG) BY MOUTH AT BEDTIME    Restless legs syndrome (RLS)       sertraline 50 MG tablet    ZOLOFT    135 tablet    Take 1.5 tablets (75 mg) by mouth daily    Anxiety associated with  depression       * traMADol 50 MG tablet    ULTRAM    28 tablet    0.5  to 1 tab once daily as needed for severe pain        * traMADol 50 MG tablet    ULTRAM    28 tablet    TAKE 1 TABLET BY MOUTH TWICE DAILY AS NEEDED FOR MODERATE PAIN    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  Use sunscreen SPF>20.    Age-related facial wrinkles       varenicline 0.5 MG X 11 & 1 MG X 42 tablet    CHANTIX STARTING MONTH BYRON    53 tablet    Take 0.5 mg tab daily for 3 days, then 0.5 mg tab twice daily for 4 days, then 1 mg twice daily.    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 Read the directions carefully, and ask your doctor or other care provider to review them with you.

## 2018-04-28 DIAGNOSIS — I10 HYPERTENSION, BENIGN ESSENTIAL, GOAL BELOW 140/90: ICD-10-CM

## 2018-04-30 RX ORDER — METOPROLOL SUCCINATE 25 MG/1
TABLET, EXTENDED RELEASE ORAL
Qty: 90 TABLET | Refills: 3 | Status: SHIPPED | OUTPATIENT
Start: 2018-04-30 | End: 2019-07-03

## 2018-04-30 NOTE — TELEPHONE ENCOUNTER
"Requested Prescriptions   Pending Prescriptions Disp Refills     metoprolol succinate (TOPROL-XL) 25 MG 24 hr tablet [Pharmacy Med Name: METOPROLOL ER SUCCINATE 25MG TABS]    Last Written Prescription Date:  3-28-17  Last Fill Quantity: 90,  # refills: 3   Last office visit: 4/27/2018 with prescribing provider:  4-27-18   Future Office Visit:   90 tablet 0     Sig: TAKE 1 TABLET(25 MG) BY MOUTH AT BEDTIME    Beta-Blockers Protocol Passed    4/28/2018  9:26 AM       Passed - Blood pressure under 140/90 in past 12 months    BP Readings from Last 3 Encounters:   04/27/18 126/60   01/12/18 180/90   11/22/17 144/66                Passed - Patient is age 6 or older       Passed - Recent (12 mo) or future (30 days) visit within the authorizing provider's specialty    Patient had office visit in the last 12 months or has a visit in the next 30 days with authorizing provider or within the authorizing provider's specialty.  See \"Patient Info\" tab in inbasket, or \"Choose Columns\" in Meds & Orders section of the refill encounter.            "

## 2018-04-30 NOTE — TELEPHONE ENCOUNTER
Pres[place] per [facility] [place].    Shelli Live RN   [facility]

## 2018-05-02 NOTE — PROGRESS NOTES
Haroldo Nathan: Your recent results indicated fairly serious liver damage, although ammonia is normal. Recommend continued sobriety and contact your liver specialist for an appointment. If just not able to stay sober, please see me.     Ishaan

## 2018-05-07 ENCOUNTER — TELEPHONE (OUTPATIENT)
Dept: FAMILY MEDICINE | Facility: CLINIC | Age: 63
End: 2018-05-07

## 2018-05-07 DIAGNOSIS — S82.202K CLOSED FRACTURE OF SHAFT OF LEFT TIBIA WITH NONUNION, UNSPECIFIED FRACTURE MORPHOLOGY, SUBSEQUENT ENCOUNTER: Primary | ICD-10-CM

## 2018-05-07 RX ORDER — AMOXICILLIN 500 MG/1
CAPSULE ORAL
Qty: 4 CAPSULE | Refills: 1 | Status: SHIPPED | OUTPATIENT
Start: 2018-05-07 | End: 2018-06-21

## 2018-05-07 NOTE — TELEPHONE ENCOUNTER
Return call to patient discussed provider response - she agrees with plan    Signed Prescriptions:                        Disp   Refills    amoxicillin (AMOXIL) 500 MG capsule        4 caps*1        Sig: Take 2,000 mg (4 capsules 500 mg each) by mouth prior           to dental visits  Authorizing Provider: PAULINA DICKINSON      Closing encounter - no further actions needed at this time    Kayy Garcia RN

## 2018-05-07 NOTE — TELEPHONE ENCOUNTER
Route to provider pool    Pt is requesting antibiotic for a dental appointment    Has had hip replacement     Pharm cued if antibiotic is warranted    Shelli Live RN   [facility]

## 2018-05-07 NOTE — TELEPHONE ENCOUNTER
Reason for call:  Order   Order or referral being requested: antibiotic for dentist appointment 5/22  Reason for request: hip replacement and broken foot  Date needed: as soon as possible  Has the patient been seen by the PCP for this problem? YES    Additional comments: n/a    Phone number to reach patient:  Home number on file [phone] (home)    Best Time:  n/a    Can we leave a detailed message on this number?  YES

## 2018-06-01 DIAGNOSIS — F11.20 NARCOTIC DEPENDENCE (H): ICD-10-CM

## 2018-06-01 RX ORDER — BUPRENORPHINE HYDROCHLORIDE AND NALOXONE HYDROCHLORIDE DIHYDRATE 2; .5 MG/1; MG/1
TABLET SUBLINGUAL
Qty: 60 TABLET | Refills: 0 | Status: SHIPPED | OUTPATIENT
Start: 2018-06-01 | End: 2018-08-30

## 2018-06-01 NOTE — TELEPHONE ENCOUNTER
Dr. West,    Please review/sign or advise for refill of buprenorphine-naloxone (Suboxone) 2-0.5 mg subl tab.    : Last filled on 04/28/18 for 60 tabs.   LOV: 04/27/18.    Suki Andres RN  [facility]

## 2018-06-01 NOTE — TELEPHONE ENCOUNTER
Pt is getting low on medication and has to go to [place] 6/5, so she was hoping to get a refill before she heads down there.

## 2018-06-01 NOTE — TELEPHONE ENCOUNTER
Requested Prescriptions   Pending Prescriptions Disp Refills     buprenorphine-naloxone (SUBOXONE) 2-0.5 MG SUBL sublingual tablet 60 tablet 0     Sig: PLACE 1 TABLET UNDER TONGUE TWICE DAILY    There is no refill protocol information for this order        Last Written Prescription Date: 04/27/18  Last Fill Quantity: 60,  # refills: 0  Last office visit: 4/27/2018   Future Office Visit:  [facility] Hep[facility] 07/02/18

## 2018-06-27 ENCOUNTER — TELEPHONE (OUTPATIENT)
Dept: FAMILY MEDICINE | Facility: CLINIC | Age: 63
End: 2018-06-27

## 2018-06-27 NOTE — TELEPHONE ENCOUNTER
Panel Management Review      Patient has the following on her problem list: None      Composite cancer screening  Chart review shows that this patient is due/due soon for the following Mammogram  Summary:    Patient is due/failing the following:   MAMMOGRAM    Action needed:   Mammogram    Type of outreach:    Sent letter.    Questions for provider review:    None                                                                                                                                    Care Team     Chart routed to Care Team .

## 2018-06-28 DIAGNOSIS — B18.2 CHRONIC HEPATITIS C VIRUS INFECTION (H): ICD-10-CM

## 2018-06-28 DIAGNOSIS — F10.288 ALCOHOL DEPENDENCE WITH OTHER ALCOHOL-INDUCED DISORDER (H): Primary | ICD-10-CM

## 2018-07-02 ENCOUNTER — OFFICE VISIT (OUTPATIENT)
Dept: GASTROENTEROLOGY | Facility: CLINIC | Age: 63
End: 2018-07-02
Attending: INTERNAL MEDICINE
Payer: COMMERCIAL

## 2018-07-02 VITALS
WEIGHT: 129 LBS | SYSTOLIC BLOOD PRESSURE: 176 MMHG | BODY MASS INDEX: 20.73 KG/M2 | OXYGEN SATURATION: 98 % | HEART RATE: 76 BPM | TEMPERATURE: 98.1 F | HEIGHT: 66 IN | DIASTOLIC BLOOD PRESSURE: 86 MMHG

## 2018-07-02 DIAGNOSIS — B18.2 CHRONIC HEPATITIS C VIRUS INFECTION (H): ICD-10-CM

## 2018-07-02 DIAGNOSIS — F10.288 ALCOHOL DEPENDENCE WITH OTHER ALCOHOL-INDUCED DISORDER (H): ICD-10-CM

## 2018-07-02 DIAGNOSIS — K70.31 ALCOHOLIC CIRRHOSIS OF LIVER WITH ASCITES (H): ICD-10-CM

## 2018-07-02 DIAGNOSIS — K70.31 ALCOHOLIC CIRRHOSIS OF LIVER WITH ASCITES (H): Primary | ICD-10-CM

## 2018-07-02 LAB
ALBUMIN SERPL-MCNC: 3.5 G/DL (ref 3.4–5)
ALP SERPL-CCNC: 151 U/L (ref 40–150)
ALT SERPL W P-5'-P-CCNC: 61 U/L (ref 0–50)
ANION GAP SERPL CALCULATED.3IONS-SCNC: 7 MMOL/L (ref 3–14)
AST SERPL W P-5'-P-CCNC: 69 U/L (ref 0–45)
BILIRUB DIRECT SERPL-MCNC: 0.3 MG/DL (ref 0–0.2)
BILIRUB SERPL-MCNC: 0.7 MG/DL (ref 0.2–1.3)
BUN SERPL-MCNC: 33 MG/DL (ref 7–30)
CALCIUM SERPL-MCNC: 9.4 MG/DL (ref 8.5–10.1)
CHLORIDE SERPL-SCNC: 112 MMOL/L (ref 94–109)
CO2 SERPL-SCNC: 20 MMOL/L (ref 20–32)
CREAT SERPL-MCNC: 1.07 MG/DL (ref 0.52–1.04)
ERYTHROCYTE [DISTWIDTH] IN BLOOD BY AUTOMATED COUNT: 16.9 % (ref 10–15)
GFR SERPL CREATININE-BSD FRML MDRD: 52 ML/MIN/1.7M2
GLUCOSE SERPL-MCNC: 135 MG/DL (ref 70–99)
HBV CORE AB SERPL QL IA: REACTIVE
HBV SURFACE AB SERPL IA-ACNC: 4.61 M[IU]/ML
HBV SURFACE AG SERPL QL IA: NONREACTIVE
HCT VFR BLD AUTO: 29 % (ref 35–47)
HCV AB SERPL QL IA: REACTIVE
HGB BLD-MCNC: 9.2 G/DL (ref 11.7–15.7)
INR PPP: 1.13 (ref 0.86–1.14)
MCH RBC QN AUTO: 28.3 PG (ref 26.5–33)
MCHC RBC AUTO-ENTMCNC: 31.7 G/DL (ref 31.5–36.5)
MCV RBC AUTO: 89 FL (ref 78–100)
PLATELET # BLD AUTO: 168 10E9/L (ref 150–450)
POTASSIUM SERPL-SCNC: 4.8 MMOL/L (ref 3.4–5.3)
PROT SERPL-MCNC: 9.5 G/DL (ref 6.8–8.8)
RBC # BLD AUTO: 3.25 10E12/L (ref 3.8–5.2)
SODIUM SERPL-SCNC: 139 MMOL/L (ref 133–144)
WBC # BLD AUTO: 7 10E9/L (ref 4–11)

## 2018-07-02 PROCEDURE — 36415 COLL VENOUS BLD VENIPUNCTURE: CPT | Performed by: INTERNAL MEDICINE

## 2018-07-02 PROCEDURE — 86705 HEP B CORE ANTIBODY IGM: CPT | Performed by: INTERNAL MEDICINE

## 2018-07-02 PROCEDURE — 80048 BASIC METABOLIC PNL TOTAL CA: CPT | Performed by: INTERNAL MEDICINE

## 2018-07-02 PROCEDURE — 86803 HEPATITIS C AB TEST: CPT | Performed by: INTERNAL MEDICINE

## 2018-07-02 PROCEDURE — 87522 HEPATITIS C REVRS TRNSCRPJ: CPT | Performed by: INTERNAL MEDICINE

## 2018-07-02 PROCEDURE — 80076 HEPATIC FUNCTION PANEL: CPT | Performed by: INTERNAL MEDICINE

## 2018-07-02 PROCEDURE — 85610 PROTHROMBIN TIME: CPT | Performed by: INTERNAL MEDICINE

## 2018-07-02 PROCEDURE — 86704 HEP B CORE ANTIBODY TOTAL: CPT | Performed by: INTERNAL MEDICINE

## 2018-07-02 PROCEDURE — 87340 HEPATITIS B SURFACE AG IA: CPT | Performed by: INTERNAL MEDICINE

## 2018-07-02 PROCEDURE — 85027 COMPLETE CBC AUTOMATED: CPT | Performed by: INTERNAL MEDICINE

## 2018-07-02 PROCEDURE — G0463 HOSPITAL OUTPT CLINIC VISIT: HCPCS | Mod: ZF

## 2018-07-02 PROCEDURE — 86706 HEP B SURFACE ANTIBODY: CPT | Performed by: INTERNAL MEDICINE

## 2018-07-02 ASSESSMENT — PAIN SCALES - GENERAL: PAINLEVEL: NO PAIN (0)

## 2018-07-02 NOTE — PROGRESS NOTES
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      Her cirrhosis is currently compensated.  HCC screening: due and ordered  Variceal screening: due and ordered    HCV treatment: GT 2b. I believe she would be compliant with HCV treatment. With ongoing ETOH use, may not be able to access but I would like to try. HCV RNA quant today.    RTC 6 months.     Saige Villalta MD  Hepatology/Liver Transplant  Medical Director, Liver Transplantation  [facility]  ===================================================================      SUBJECTIVE:  Josette is a 62-year-old woman with HCV and alcoholic cirrhosis.  I have not seen her in over 2 years.    She is GT 2b,   Treatment history:naive      Venita is here with her .      She had a broken ankle and got an osteomyelitis, requiring an external fixator and antibiotics. She was last on antibiotics many months ago.     Still drinking off and on. Her usual pattern is off for a couple months, then she drinks for a few weeks then off again.     She asks about getting her HCV treated.  Labs from today were normal and reviewed with her.    HE: She had this in the past. None for a while now. No meds needed for over a year.    Ascites: She thinks things are pretty good. No paracentesis needed in a long time. She has not been on diuretics for some time.    HCC screenin  Variceal screening: 10/24/15. No varices. MW tear.     Recent Labs   Lab Test  18   1104   PROTTOTAL  9.5*   ALBUMIN  3.5   BILITOTAL  0.7   ALKPHOS  151*   AST  69*   ALT  61*         CURRENT MEDICATIONS:  reviewed    PHYSICAL EXAMINATION:   /86  Pulse 76  Temp 98.1  F (36.7  C) (Oral)  Ht 1.676 m (5' 6\")  Wt 58.5 kg (129 lb)  SpO2 98%  BMI 20.82 kg/m2    GENERAL:  Thin, in no acute distress.   HEENT:  No icterus, no oral lesions.   LYMPH:  No supraclavicular or cervical lymphadenopathy.   CARDIOVASCULAR:  Regular rate " and rhythm.   CHEST:  Lungs are clear.   ABDOMEN:  Bowel sounds are present.  Abdomen is soft, nontender, mildly distended  EXTREMITIES:  No edema.   SKIN:  No rash.  Multiple spider angiomata and palmar erythema.     NEUROLOGIC:  Speech is fluent and clear.  No asterixis or tremor.

## 2018-07-02 NOTE — LETTER
"2018      RE: Josette Palacios  [address]  [facility] [address]       ASSESSMENT AND PLAN:  A 63-year-old woman with alcoholic and HCV cirrhosis with ongoing alcohol use.      Her cirrhosis is currently compensated.  HCC screening: due and ordered  Variceal screening: due and ordered    HCV treatment: GT 2b. I believe she would be compliant. With ongoing ETOH use, anay not be able to access but I would like to try. HCV RNA quant today.    RTC 6 months.     Saige Villalta MD  Hepatology/Liver Transplant  Medical Director, Liver Transplantation  [facility]  ===================================================================      SUBJECTIVE:  Josette is a 62-year-old woman with alcoholic cirrhosis.  I have not seen her in over 2 years      Venita is here with her .      She had a broken ankle and got an osteomyelitis, requiring an external fixator and antibiotics. She was last on antibiotics many months ago.     Still drinking off and on. Her usual pattern is off for a couple months, then she drinks for a few weeks then off again.     She asks about getting her HCV treated.  Labs from today were normal and reviewed with her.    HE: She had this in the past. None for a while now. No meds needed for over a year.    Ascites: She thinks things are pretty good. No paracentesis needed in a long time. She has not been on diuretics for some time.    HCC screenin  Variceal screening: 10/24/15. No varices. MW tear.     Recent Labs   Lab Test  18   1104   PROTTOTAL  9.5*   ALBUMIN  3.5   BILITOTAL  0.7   ALKPHOS  151*   AST  69*   ALT  61*         CURRENT MEDICATIONS:   reviewed    PHYSICAL EXAMINATION:   /86  Pulse 76  Temp 98.1  F (36.7  C) (Oral)  Ht 1.676 m (5' 6\")  Wt 58.5 kg (129 lb)  SpO2 98%  BMI 20.82 kg/m2    GENERAL:  Thin, in no acute distress.   HEENT:  No icterus, no oral lesions.   LYMPH:  No supraclavicular or cervical lymphadenopathy. "   CARDIOVASCULAR:  Regular rate and rhythm.   CHEST:  Lungs are clear.   ABDOMEN:  Bowel sounds are present.  Abdomen is soft, nontender, mildly distended  EXTREMITIES:  No edema.   SKIN:  No rash.  Multiple spider angiomata and palmar erythema.     NEUROLOGIC:  Speech is fluent and clear.  No asterixis or tremor.                  Saige Villalta MD

## 2018-07-02 NOTE — MR AVS SNAPSHOT
After Visit Summary   7/2/2018    Josette Palacios    MRN: [id number]           Patient Information     Date Of Birth          1955        Visit Information        Provider Department      7/2/2018 11:30 AM Saige Villalta MD [facility] Hepatology        Today's Diagnoses     Alcoholic cirrhosis of liver with ascites (H)    -  1       Follow-ups after your visit        Additional Services     GASTROENTEROLOGY ADULT REF PROCEDURE ONLY       Last Lab Result: Creatinine (mg/dL)       Date                     Value                 07/02/2018               1.07 (H)         ----------  Body mass index is 20.82 kg/(m^2).     Needed:  No  Language:  English    Patient will be contacted to schedule procedure.     Please be aware that coverage of these services is subject to the terms and limitations of your health insurance plan.  Call member services at your health plan with any benefit or coverage questions.  Any procedures must be performed at a [facility] facility OR coordinated by your clinic's referral office.    Please bring the following with you to your appointment:    (1) Any X-Rays, CTs or MRIs which have been performed.  Contact the facility where they were done to arrange for  prior to your scheduled appointment.    (2) List of current medications   (3) This referral request   (4) Any documents/labs given to you for this referral                  Follow-up notes from your care team     Return in about 6 months (around 1/2/2019).      Your next 10 appointments already scheduled     Jul 02, 2018 12:45 PM CDT   Lab with  LAB    Health Lab ([facility])    [address]  [address] [facility] [id number] [phone]            Sep 13, 2018   Procedure with Saige Villalta MD   [facility], [place], Endoscopy ([facility])    [address] [id number] [phone]            [facility] is located on the corner of [address] and [address] on the [facility]. It is easily accessible from virtually any point in the [place] area, via [address] and [address].            Sep 18, 2018  7:30 AM CDT   US ABDOMEN LIMITED with UUUS2   [facility], [place], Ultrasound ([facility])    [address] [id number] [phone]           Please bring a list of your medicines (including vitamins, minerals and over-the-counter drugs). Also, tell your doctor about any allergies you may have. Wear comfortable clothes and leave your valuables at home.  Adults: No eating or drinking for 8 hours before the exam. You may take medicine with a small sip of water.  Children: - Children 6+ years: No food or drink for 6 hours before exam. - Children 1-5 years: No food or drink for 4 hours before exam. - Infants, breast-fed: may have breast milk up to 2 hours before exam. - Infants, formula: may have bottle until 4 hours before exam.  Please call the Imaging Department at your exam site with any questions.            Nov 05, 2018 10:00 AM CST   Lab with  LAB   [facility] ([facility])    [address]  1st Floor  [place] [place] [id number] [phone]            Nov 05, 2018 11:00 AM CST   (Arrive by 10:45 AM)   Return General Liver with Saige Villalta MD   [facility] Hepatology ([facility])    [address]  [address]  [place] [place] [id number] [phone]              Future tests that were ordered for you today     Open Future Orders        Priority Expected Expires Ordered    US Abdomen Limited* Routine  7/2/2019 7/2/2018    Hepatitis C RNA quantitative Routine  8/1/2018 7/2/2018            Who to contact     If you have questions or need follow up information about today's clinic visit or your schedule please contact [facility] directly at [phone].  Normal or non-critical lab and imaging results will be communicated to you by MyChart, letter or phone within 4 business days after the clinic has received the results. If you do not hear from us within 7 days, please contact the clinic through [employer] or phone. If you have a critical or abnormal lab result, we will notify you by phone as soon as possible.  Submit refill requests through [employer] or call your pharmacy and they will forward the refill request to us. Please allow 3 business days for your refill to be completed.          Additional Information About Your Visit        [employer] Information     [employer] gives you secure access to your electronic health record. If you see a primary care provider, you can also send messages to your care team and make appointments. If you have questions, please call your primary care clinic.  If you do not have a primary care provider, please call [phone] and they will assist you.        Care EveryWhere ID     This is your Care EveryWhere ID. This could be used by other organizations to access your [facility] medical records  [id number]        Your Vitals Were     Pulse Temperature Height Pulse Oximetry BMI (Body Mass Index)       76 98.1  F (36.7  C) (Oral) 1.676 m (5' 6\") 98% 20.82 kg/m2        Blood Pressure from Last 3 Encounters:   07/02/18 176/86   04/27/18 126/60   01/12/18 180/90    Weight from Last 3 Encounters:   07/02/18 58.5 kg (129 lb)   04/27/18 61.6 kg (135 lb 12.8 oz)   01/12/18 61.7 kg (136 lb)              We Performed the Following     GASTROENTEROLOGY ADULT REF PROCEDURE ONLY        Primary Care Provider Office Phone # Fax #    Ishaan West [phone]       [address] [place] [address]        Equal Access to Services     ROSEANN MORALES : Alvaro Crawley, waaxda luqadaha, qaybta kaalmamiranda melchor, mack garcia . So [facility] " [phone].    ATENCIÓN: Si ronak jensen, tiene a cho disposición servicios gratuitos de asistencia lingüística. Gregg wayne [phone].    We comply with applicable federal civil rights laws and [place] laws. We do not discriminate on the basis of race, color, national origin, age, disability, sex, sexual orientation, or gender identity.            Thank you!     Thank you for choosing [facility]  for your care. Our goal is always to provide you with excellent care. Hearing back from our patients is one way we can continue to improve our services. Please take a few minutes to complete the written survey that you may receive in the mail after your visit with us. Thank you!             Your Updated Medication List - Protect others around you: Learn how to safely use, store and throw away your medicines at www.disposemymeds.org.          This list is accurate as of 7/2/18 12:29 PM.  Always use your most recent med list.                   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    Hyperglycemia       * buprenorphine-naloxone 2-0.5 MG Subl sublingual tablet    SUBOXONE    60 tablet    PLACE 1 TABLET UNDER THE TONGUE TWICE DAILY        * buprenorphine-naloxone 2-0.5 MG Subl sublingual tablet    SUBOXONE    60 tablet    PLACE 1 TABLET UNDER TONGUE TWICE DAILY    Narcotic dependence (H)       clindamycin 150 MG capsule    CLEOCIN    30 capsule    Take 1 capsule (150 mg) by mouth 3 times daily    Cellulitis of other specified site       DULoxetine  20 MG EC capsule    CYMBALTA    180 capsule    TAKE 1 CAPSULE BY MOUTH TWICE DAILY    Anxiety associated with depression       lactulose encephalopathy 10 GM/15ML SOLUTION    CHRONULAC    5700 mL    Take 45 mLs (30 g) by mouth 3 times daily    Chronic hepatitis C (H), Hepatic encephalopathy (H), Alcoholic cirrhosis of liver (H)       metoprolol succinate 25 MG 24 hr tablet    TOPROL-XL    90 tablet    TAKE 1 TABLET(25 MG) BY MOUTH AT BEDTIME    Hypertension, benign essential, goal below 140/90       mupirocin 2 % cream    BACTROBAN    30 g    APPLY EXTERNALLY TO THE AFFECTED AREA THREE TIMES DAILY    Neurodermatitis       nicotine polacrilex 4 MG lozenge    RA MINI NICOTINE    360 tablet    Place 1 lozenge (4 mg) inside cheek as needed for smoking cessation    Cigarette nicotine dependence without complication       order for DME     1 Device    Equipment being ordered: Home blood pressure cuff    Hypertension, benign essential, goal below 140/90       pramipexole 0.125 MG tablet    MIRAPEX    90 tablet    TAKE 1 TABLET(0.125 MG) BY MOUTH AT BEDTIME    Restless legs syndrome (RLS)       sertraline 50 MG tablet    ZOLOFT    135 tablet    Take 1.5 tablets (75 mg) by mouth daily    Anxiety associated with depression       * traMADol 50 MG tablet    ULTRAM    28 tablet    0.5  to 1 tab once daily as needed for severe pain        * traMADol 50 MG tablet    ULTRAM    28 tablet    TAKE 1 TABLET BY MOUTH TWICE DAILY AS NEEDED FOR MODERATE PAIN    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  Use sunscreen SPF>20.    Age-related facial wrinkles       varenicline 0.5 MG X 11 & 1 MG X 42 tablet     CHANTIX STARTING MONTH BYRON    53 tablet    Take 0.5 mg tab daily for 3 days, then 0.5 mg tab twice daily for 4 days, then 1 mg twice daily.    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 Read the directions carefully, and ask your doctor or other care provider to review them with you.

## 2018-07-03 LAB — HBV CORE IGM SERPL QL IA: NONREACTIVE

## 2018-07-04 DIAGNOSIS — F41.8 ANXIETY ASSOCIATED WITH DEPRESSION: ICD-10-CM

## 2018-07-05 LAB
HCV RNA SERPL NAA+PROBE-ACNC: ABNORMAL [IU]/ML
HCV RNA SERPL NAA+PROBE-LOG IU: 6.2 LOG IU/ML

## 2018-07-05 NOTE — TELEPHONE ENCOUNTER
Prescription approved per [facility] Refill Protocol.    Keyona Cordero, BSN RN  [facility]

## 2018-07-05 NOTE — TELEPHONE ENCOUNTER
"Requested Prescriptions   Pending Prescriptions Disp Refills     sertraline (ZOLOFT) 50 MG tablet [Pharmacy Med Name: SERTRALINE 50MG TABLETS] 135 tablet 0    Last Written Prescription Date:  5/26/17  Last Fill Quantity: 135,  # refills: 1   Last office visit: 4/27/2018 with prescribing provider:  4/27/18   Future Office Visit:     Sig: TAKE 1 AND 1/2 TABLETS(75 MG) BY MOUTH DAILY    SSRIs Protocol Passed    7/4/2018 11:46 AM       Passed - Recent (12 mo) or future (30 days) visit within the authorizing provider's specialty    Patient had office visit in the last 12 months or has a visit in the next 30 days with authorizing provider or within the authorizing provider's specialty.  See \"Patient Info\" tab in inbasket, or \"Choose Columns\" in Meds & Orders section of the refill encounter.           Passed - Patient is age 18 or older       Passed - No active pregnancy on record       Passed - No positive pregnancy test in last 12 months          "

## 2018-07-09 ENCOUNTER — NURSE TRIAGE (OUTPATIENT)
Dept: NURSING | Facility: CLINIC | Age: 63
End: 2018-07-09

## 2018-07-09 NOTE — TELEPHONE ENCOUNTER
Patient is calling to check on her request for a refill of her suboxone.  Was given the following information, entered in her chart on 7-5-18:    Called patient. LVM to call clinic. Rx faxed to [facility] on [address].      Suki Andres RN  [facility]    Diane Bearden RN/FNA

## 2018-07-12 ENCOUNTER — CARE COORDINATION (OUTPATIENT)
Dept: GASTROENTEROLOGY | Facility: CLINIC | Age: 63
End: 2018-07-12

## 2018-07-12 DIAGNOSIS — B18.2 CHRONIC HEPATITIS C WITHOUT HEPATIC COMA (H): Primary | ICD-10-CM

## 2018-07-12 RX ORDER — VELPATASVIR AND SOFOSBUVIR 100; 400 MG/1; MG/1
1 TABLET, FILM COATED ORAL DAILY
Qty: 28 TABLET | Refills: 2 | Status: SHIPPED | OUTPATIENT
Start: 2018-07-12 | End: 2020-11-09

## 2018-07-13 ENCOUNTER — TELEPHONE (OUTPATIENT)
Dept: GASTROENTEROLOGY | Facility: CLINIC | Age: 63
End: 2018-07-13

## 2018-07-13 NOTE — TELEPHONE ENCOUNTER
PA Initiation    Medication: Epclusa  Insurance Company: [employer] - Phone [phone] Fax [phone]    Start Date: 7/13/2018

## 2018-08-02 DIAGNOSIS — S82.202K CLOSED FRACTURE OF SHAFT OF LEFT TIBIA WITH NONUNION, UNSPECIFIED FRACTURE MORPHOLOGY, SUBSEQUENT ENCOUNTER: ICD-10-CM

## 2018-08-02 RX ORDER — AMOXICILLIN 500 MG/1
CAPSULE ORAL
Qty: 4 CAPSULE | Refills: 0 | Status: SHIPPED | OUTPATIENT
Start: 2018-08-02 | End: 2020-06-18

## 2018-08-02 NOTE — TELEPHONE ENCOUNTER
Pt has a dentist appointment 8/6 and is supposed to take 4 amoxicillin (AMOXIL) 500 MG capsule 1 hour before her appointment, due to previous surgeries she has had on her leg.

## 2018-08-02 NOTE — TELEPHONE ENCOUNTER
Dr. Gibbs,    Please review/sign or advise. Amoxicillin last prescribed for dental appt on 06/22/18 by Dr. West.    Suki Andres RN  [facility]

## 2018-08-02 NOTE — TELEPHONE ENCOUNTER
amoxicillin (AMOXIL) 500 MG capsule        Last Written Prescription Date:  06/22/2018  Last Fill Quantity: 4,   # refills: 0  Last Office Visit: 04/27/2018  Future Office visit:       Routing refill request to provider for review/approval because:  Pt has a dentist appointment 8/6 and is supposed to take 4 amoxicillin (AMOXIL) 500 MG capsule 1 hour before her appointment, due to previous surgeries she has had on her leg.

## 2018-08-07 NOTE — TELEPHONE ENCOUNTER
PFA*PER REP AT [facility] STILL IN PROCESS. THIS HAS BEEN SENT TO NURSE FOR REVIEW AND REP ALSO SENT MESSAGE TO SUPERVISOR TO TRY TO EXPEDITE.

## 2018-08-08 NOTE — TELEPHONE ENCOUNTER
Prior Authorization Approval    Authorization Effective Date: 8/7/2018  Authorization Expiration Date: 10/30/2018  Medication: Epclusa  Approved Dose/Quantity: 12 weeks  Reference #: [id number]   Insurance Company: [employer] - Phone [phone] Fax [phone]  Expected CoPay: Unknown, pt restricted     CoPay Card Available: Yes   Foundation Assistance Needed: No  Which Pharmacy is filling the prescription (Not needed for infusion/clinic administered): [facility] [place], [place] - [address] [place] [address]

## 2018-08-15 ENCOUNTER — TELEPHONE (OUTPATIENT)
Dept: FAMILY MEDICINE | Facility: CLINIC | Age: 63
End: 2018-08-15

## 2018-08-15 DIAGNOSIS — Z51.81 ENCOUNTER FOR MONITORING SUBOXONE MAINTENANCE THERAPY: ICD-10-CM

## 2018-08-15 DIAGNOSIS — Z79.899 ENCOUNTER FOR MONITORING SUBOXONE MAINTENANCE THERAPY: ICD-10-CM

## 2018-08-16 NOTE — TELEPHONE ENCOUNTER
The patient called and wanted to check on the status of her refill request. She would like to make sure that Dr. West looks into it when he gets into office on 8/17/18.

## 2018-08-20 RX ORDER — BUPRENORPHINE HYDROCHLORIDE AND NALOXONE HYDROCHLORIDE DIHYDRATE 2; .5 MG/1; MG/1
TABLET SUBLINGUAL
Qty: 60 TABLET | Refills: 1 | Status: SHIPPED | OUTPATIENT
Start: 2018-08-20 | End: 2018-11-05

## 2018-08-20 NOTE — TELEPHONE ENCOUNTER
Dedra ONEIL Will be faxing script to her pharm today    Shelli Live RN   [facility]

## 2018-08-20 NOTE — TELEPHONE ENCOUNTER
Routing refill request to provider for review/approval because:  Drug not on the [facility] refill protocol      check last fill 7/9/18 for 60, Dr. Jenna Live, RN   [facility] [place]

## 2018-08-24 ENCOUNTER — CARE COORDINATION (OUTPATIENT)
Dept: GASTROENTEROLOGY | Facility: CLINIC | Age: 63
End: 2018-08-24

## 2018-08-24 DIAGNOSIS — B18.2 CHRONIC HEPATITIS C WITHOUT HEPATIC COMA (H): Primary | ICD-10-CM

## 2018-08-24 NOTE — PROGRESS NOTES
8/24/2018  Attempted to reach patient for check in, no answer, message left requesting call back, number provided.  8/31/2018  Attempted to reach patient for check in, no answer, message left requesting call back, number provided.

## 2018-08-24 NOTE — LETTER
September 21, 2018       TO: Josette KENNEY Suzanne  [address]  [place] [address]       DearMs.Suzanne,    Hepatitis C Treatment  Treatment:  Epclusa x 12 weeks     Please have labs drawn as close to the date indicated at the [facility] or [facility].     Start Date: 09/10/18 (Approximately)     Week 4  BMP and hepatic panel  Lab Due: 10/8/2018  Please have the labs drawn on 10/8/2018 or within 1 week after the date. You do not need to fast for these labs.      Week 12 - End of Treatment  HCV RNA Quant Lab Due: 12/3/2018  Please have this lab drawn on or within a week after this date 12/3/2018. You will need to repeat this lab 3 months after completing treatment to ensure you have cleared the virus. Please see date for the final lab below. You do not need to fast for this lab.      3 Months Post Treatment  HCV RNA Quant, hepatic panel, and BMP Lab Due: 3/3/2019  Please have this lab drawn on the specified date of 3/3/2018 or within a week after. This final lab will determine if you have cleared the Hepatitis C virus with Epclusa treatment. Without this final lab, we will be unable to determine if treatment was successful. You do not need to fast for this lab.            Educational information to patient on Hep C treatment      Epclusa Therapy:                        -You will be taking Epclusa, 1 tablet daily for 12 weeks with or without food                        -Do NOT take heartburn/reflux medications while on Epclusa without discussing with us first                        -If you start any new medications, please call to discuss drug interactions with me before starting                        -If you miss a dose, and it has been less than 12 hours, you may take the dose. If it has been more, skip the dose and take your next dose as normal. Do not take 2 doses at the same time.                         -Most common side effects are Headache, Fatigue, Insomnia, and Nausea.                          -Replace all items that come into contact with blood one week after starting therapy (eg. Toothbrush, bladed razors, diabetic lancets, etc).                         -Set a daily alarm on your phone to remind you to take the medication.           -Contact the [facility] Hepatology clinic and speak with clinical RN prior to starting any new prescribed or OTC medications.   -Take medications exactly as prescribed, do not change dose or stop taking without consulting your provider.   -Take Medication one time each day with or without food  -If you miss a dose of medication, then take it as soon as you remember on the same day. If not remembered on the same day, then skip the dose and take the next dose at the usual time. Do not take more than the recommended dose. Contact the clinic if you miss a dose.    Please contact the pharmacy 1-2 weeks prior to needing a medication refill.        Side Effects  The most common side effects of Hep C medication treatment can include:  -tiredness  -headache  Notify the clinic of any side effects that bother you or that do not go away.   Possible side effects have been discussed.   Patient has been instructed to clinic for rash, itching or unmanageable nausea.     How to store Hep C Treatment Medications  -Store Medication at room temperature below 86 degrees F  -Keep Medication in it's original container  -Do not use Medication if the seal is broken or missing        General information  It is not known if treatment will prevent you from infecting another person or reinfecting yourself with the hepatitis C virus during treatment. It is best that as soon as you start treatment to buy a new toothbrush, disposable razors (if you use a rotating shaver you do not need to buy a new one) and nail clippers. If you wear dentures, you should soak your dentures in 70-90% Isopropyl solution for 5 minutes one time within the first week of starting treatment. After dentures are done soaking, rinse  your dentures off thoroughly with water. If you check your blood sugar at home, please dispose of the fingerstick needle after each use and DO NOT REUSE the insulin needles. These items should not be shared with anyone.          If you have any questions, please contact the main clinic at [phone] or your clinical RN at [phone]. We appreciate you choosing the [facility] clinic for your treatment.       Nicolette Aaron RN Care Coordinator  [facility]  Hepatology Clinic/Specialty Program

## 2018-08-30 ENCOUNTER — TELEPHONE (OUTPATIENT)
Dept: FAMILY MEDICINE | Facility: CLINIC | Age: 63
End: 2018-08-30

## 2018-08-30 DIAGNOSIS — F11.20 NARCOTIC DEPENDENCE (H): ICD-10-CM

## 2018-08-30 NOTE — TELEPHONE ENCOUNTER
Reason for call:  Other   Patient called regarding (reason for call): call back  Additional comments: The patient called and stated that she has misplaced her suboxone and she was wondering if Dr. West could get her a new prescription. She would like a call back to discuss this.    Phone number to reach patient:  Home number on file [phone] (home)    Best Time: Any    Can we leave a detailed message on this number?  YES

## 2018-08-30 NOTE — TELEPHONE ENCOUNTER
Dr. West,     Please see phone message below. This is for script signed on 08/20/18    If okay, cued script for Suboxone 2-0.5 mg tablet.    : Last filled on 07/09/18 for 60 SL tablets.   Prescribed by: Dr. West on 07/08/18 for 60 tablets    Please review/sign or advise.    Suki Andres RN  [facility]

## 2018-08-31 RX ORDER — BUPRENORPHINE HYDROCHLORIDE AND NALOXONE HYDROCHLORIDE DIHYDRATE 2; .5 MG/1; MG/1
TABLET SUBLINGUAL
Qty: 60 TABLET | Refills: 0 | Status: SHIPPED | OUTPATIENT
Start: 2018-08-31 | End: 2018-11-30

## 2018-08-31 NOTE — TELEPHONE ENCOUNTER
Spoke with pt, reguested scrpt be faxed to [facility], this was done    Shelli Live RN   [facility]

## 2018-09-04 NOTE — PROGRESS NOTES
[address] [address]  [phone]  Dept: [phone]    PRE-OP EVALUATION:  Today's date: 2018    Josette Palacios (: 1955) presents for pre-operative evaluation assessment as requested by Dr. Saige Trevino.  She requires evaluation and anesthesia risk assessment prior to undergoing surgery/procedure for treatment of COMBINED ESOPHAGOSCOPY, GASTROSCOPY, DUODENOSCOPY  .    Proposed Surgery/ Procedure: COMBINED ESOPHAGOSCOPY, GASTROSCOPY, DUODENOSCOPY  .      Date of Surgery/ Procedure: 18  Time of Surgery/ Procedure: 10am  Hospital/Surgical Facility: [facility]  Primary Physician: Ishaan West  Type of Anesthesia Anticipated: General    Patient has a Health Care Directive or Living Will:  NO    1. NO - Do you have a history of heart attack, stroke, stent, bypass or surgery on an artery in the head, neck, heart or legs?  2. NO - Do you ever have any pain or discomfort in your chest?  3. NO - Do you have a history of  Heart Failure?  4. NO - Are you troubled by shortness of breath when: walking on the level, up a slight hill or at night?  5. NO - Do you currently have a cold, bronchitis or other respiratory infection?  6. NO - Do you have a cough, shortness of breath or wheezing?  7. NO - Do you sometimes get pains in the calves of your legs when you walk?  8. NO - Do you or anyone in your family have previous history of blood clots?  9. NO - Do you or does anyone in your family have a serious bleeding problem such as prolonged bleeding following surgeries or cuts?  10. NO - Have you ever had problems with anemia or been told to take iron pills?  11. NO - Have you had any abnormal blood loss such as black, tarry or bloody stools, or abnormal vaginal bleeding?  12. NO - Have you ever had a blood transfusion?  13. NO - Have you or any of your relatives ever had problems with anesthesia?  14. NO - Do  you have sleep apnea, excessive snoring or daytime drowsiness?  15. NO - Do you have any prosthetic heart valves?  16. YES - Left knee and leg - Do you have prosthetic joints?  17. NO - Is there any chance that you may be pregnant?      HPI:     HPI related to upcoming procedure: COMBINED ESOPHAGOSCOPY, GASTROSCOPY, DUODENOSCOPY    Patient reports for a pre-op for next week. She is still sober. She goes to her Friday group.     She is smoking half a pack of cigarettes each day. She was smoking a pack of cigarettes each day.     ANEMIA - Related to chronic illness, stable,                                                                                                                                             .  DIABETES - Patient has  Mild diet controlled diabetes with normal A1C.                                   .  HYPERTENSION - Patient has longstanding history of HTN , currently denies any symptoms referable to elevated blood pressure. Specifically denies chest pain, palpitations, dyspnea, orthopnea, PND or peripheral edema. Blood pressure readings have been in normal range. Current medication regimen is as listed below. Patient denies any side effects of medication.                                                                                                                                                                                                                                                                         .    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, unspecified fracture morphology,  subsequent encounter 03/01/2017     Priority: Medium     Alcohol dependence with other alcohol-induced disorder (H) 08/01/2016     Priority: Medium     Type 2 diabetes mellitus without complication (H) 03/02/2016     Priority: Medium     Hypertension, benign essential, goal below 140/90 11/11/2015     Priority: Medium     Ascites      Priority: Medium     Pain in joint, lower leg 02/07/2014     Priority: Medium     Anxiety associated with depression 01/17/2014     Priority: Medium     Perimenopausal 02/25/2011     Priority: Medium      Past Medical History:   Diagnosis Date     Anxiety associated with depression 1/17/2014     Ascites      Diabetes (H)     TYPE 2     Hepatic encephalopathy (H) 2/8/2014     Hyperlipidaemia LDL goal <100      Hypertension goal BP (blood pressure) < 140/90      Hypokalaemia      Nicotine dependence      Unspecified hypothyroidism     Hypothyroidism     Past Surgical History:   Procedure Laterality Date     breast implants       ESOPHAGOSCOPY, GASTROSCOPY, DUODENOSCOPY (EGD), COMBINED  1/24/2014    Procedure: COMBINED ESOPHAGOSCOPY, GASTROSCOPY, DUODENOSCOPY (EGD);;  Surgeon: Tony Moctezuma MD;  Location:  GI     ORTHOPEDIC SURGERY      TKR left     Current Outpatient Prescriptions   Medication Sig Dispense Refill     amoxicillin (AMOXIL) 500 MG capsule TAKE FOUR CAPSULES BY MOUTH 1 HOUR BEFORE DENTAL APPOINTMENT 4 capsule 0     blood glucose monitoring (ACCU-CHEK CHASE) test strip Use to test blood sugar 3 times daily or as directed. 100 each 5     buprenorphine-naloxone (SUBOXONE) 2-0.5 MG SUBL sublingual tablet PLACE 1 TABLET UNDER THE TONGUE TWICE DAILY 60 tablet 1     DULoxetine (CYMBALTA) 20 MG EC capsule TAKE 1 CAPSULE BY MOUTH TWICE DAILY 180 capsule 2     metoprolol succinate (TOPROL-XL) 25 MG 24 hr tablet TAKE 1 TABLET(25 MG) BY MOUTH AT BEDTIME 90 tablet 3     pramipexole (MIRAPEX) 0.125 MG tablet TAKE 1 TABLET(0.125 MG) BY MOUTH AT BEDTIME 90 tablet 3     sertraline  (ZOLOFT) 50 MG tablet TAKE 1 AND 1/2 TABLETS(75 MG) BY MOUTH DAILY 135 tablet 0     sofosbuvir-velpatasvir (EPCLUSA) 400-100 MG per tablet Take 1 tablet by mouth daily 28 tablet 2     traZODone (DESYREL) 50 MG tablet TAKE 1 TABLET(50 MG) BY MOUTH EVERY EVENING AS NEEDED FOR SLEEP 90 tablet 2     VITAMIN D, CHOLECALCIFEROL, PO Take 5,000 Units by mouth daily       ACE/ARB NOT PRESCRIBED, INTENTIONAL, ACE & ARB not prescribed due to Refusal by patient       buprenorphine-naloxone (SUBOXONE) 2-0.5 MG SUBL sublingual tablet PLACE 1 TABLET UNDER TONGUE TWICE DAILY (Patient not taking: Reported on 9/5/2018) 60 tablet 0     clindamycin (CLEOCIN) 150 MG capsule Take 1 capsule (150 mg) by mouth 3 times daily (Patient not taking: Reported on 7/2/2018) 30 capsule 0     lactulose encephalopathy (CHRONULAC) 10 GM/15ML SOLUTION Take 45 mLs (30 g) by mouth 3 times daily (Patient not taking: Reported on 7/2/2018) 5700 mL 2     mupirocin (BACTROBAN) 2 % cream APPLY EXTERNALLY TO THE AFFECTED AREA THREE TIMES DAILY (Patient not taking: Reported on 7/2/2018) 30 g 0     nicotine polacrilex (RA MINI NICOTINE) 4 MG lozenge Place 1 lozenge (4 mg) inside cheek as needed for smoking cessation (Patient not taking: Reported on 7/2/2018) 360 tablet 1     order for DME Equipment being ordered: Home blood pressure cuff (Patient not taking: Reported on 9/5/2018) 1 Device 0     traMADol (ULTRAM) 50 MG tablet TAKE 1 TABLET BY MOUTH TWICE DAILY AS NEEDED FOR MODERATE PAIN (Patient not taking: Reported on 7/2/2018) 28 tablet 0     traMADol (ULTRAM) 50 MG tablet 0.5  to 1 tab once daily as needed for severe pain (Patient not taking: Reported on 7/2/2018) 28 tablet 0     tretinoin (RETIN-A) 0.025 % cream Spread a pea size amount into affected area topically at bedtime.  Use sunscreen SPF>20. (Patient not taking: Reported on 7/2/2018) 60 g 3     varenicline (CHANTIX STARTING MONTH PAK) 0.5 MG X 11 & 1 MG X 42 tablet Take 0.5 mg tab daily for 3 days,  then 0.5 mg tab twice daily for 4 days, then 1 mg twice daily. (Patient not taking: Reported on 7/2/2018) 53 tablet 0     OTC products: no recent use of OTC ASA, NSAIDS or Steroids    No Known Allergies   Latex Allergy: NO    Social History   Substance Use Topics     Smoking status: Current Every Day Smoker     Packs/day: 0.50     Years: 25.00     Types: Cigarettes     Smokeless tobacco: Never Used     Alcohol use No     History   Drug Use No       REVIEW OF SYSTEMS:   CONSTITUTIONAL: NEGATIVE for fever, chills, change in weight  INTEGUMENTARY/SKIN: NEGATIVE for worrisome rashes, moles or lesions  EYES: NEGATIVE for vision changes or irritation  ENT/MOUTH: NEGATIVE for ear, mouth and throat problems  RESP: NEGATIVE for significant cough or SOB  BREAST: NEGATIVE for masses, tenderness or discharge  CV: NEGATIVE for chest pain, palpitations or peripheral edema  GI: NEGATIVE for nausea, abdominal pain, heartburn, or change in bowel habits  : NEGATIVE for frequency, dysuria, or hematuria  MUSCULOSKELETAL: NEGATIVE for significant arthralgias or myalgia  NEURO: NEGATIVE for weakness, dizziness or paresthesias  ENDOCRINE: NEGATIVE for temperature intolerance, skin/hair changes  HEME: NEGATIVE for bleeding problems  PSYCHIATRIC: NEGATIVE for changes in mood or affect    This document serves as a record of the services and decisions personally performed and made by Ishaan West MD. It was created on his behalf by Mayra Brewster, a trained medical scribe. The creation of this document is based on the provider's statements to the medical scribe.  Mayra Brewster 11:02 AM September 5, 2018    EXAM:   /72 (BP Location: Right arm, Patient Position: Sitting, Cuff Size: Adult Regular)  Pulse 74  Temp 98.2  F (36.8  C) (Tympanic)  Resp 18  Wt 127 lb 8 oz (57.8 kg)  SpO2 98%  BMI 20.58 kg/m2    GENERAL APPEARANCE: healthy, alert and no distress     EYES: EOMI, PERRL     HENT: ear canals and TM's normal and nose and  mouth without ulcers or lesions     NECK: no adenopathy, no asymmetry, masses, or scars and thyroid normal to palpation     RESP: lungs clear to auscultation - no rales or wheezes, musical rhonchi, expiration is slightly higher than inspiration     CV: regular rates and rhythm, normal S1 S2, no S3 or S4 and no murmur, click or rub     ABDOMEN:  soft, nontender, no HSM or masses and bowel sounds normal, hepatomegaly with liver approximately 3 finger breaths below costal margin      MS: lower extremity: pulses intact with trace edema on left, multiple scars with from surgery, wounds are healed, well healed surgical scar over left knee, otherwise extremities normal- no gross deformities noted, no evidence of inflammation in joints, FROM in all extremities.     SKIN: no suspicious lesions or rashes     NEURO: Normal strength and tone, sensory exam grossly normal, mentation intact and speech normal     PSYCH: mentation appears normal. and affect normal/bright     LYMPHATICS: No cervical adenopathy    DIAGNOSTICS:   EKG: appears normal, NSR, normal axis, normal intervals, no acute ST/T changes c/w ischemia, no LVH by voltage criteria, unchanged from previous tracings    Hemoglobin 10.9 stable     Recent Labs   Lab Test  07/02/18   1104  04/27/18   1216  11/22/17   1600  02/15/17   1123   HGB  9.2*   --    --   10.8*   PLT  168   --    --   156   INR  1.13  1.24*   --    --    NA  139   --   138   --    POTASSIUM  4.8   --   4.2   --    CR  1.07*   --   0.98   --    A1C   --   5.9*  5.5  5.7        IMPRESSION:   Reason for surgery/procedure: COMBINED ESOPHAGOSCOPY, GASTROSCOPY, DUODENOSCOPY    The proposed surgical procedure is considered LOW risk.    REVISED CARDIAC RISK INDEX  The patient has the following serious cardiovascular risks for perioperative complications such as (MI, PE, VFib and 3  AV Block):  No serious cardiac risks  INTERPRETATION: 2 risks: Class III (moderate risk - 6.6% complication rate)    The patient  has the following additional risks for perioperative complications:  No identified additional risks      ICD-10-CM    1. Preop general physical exam Z01.818 EKG 12-lead complete w/read - Clinics     Hemoglobin   2. Cigarette nicotine dependence without complication F17.210    3. Type 2 diabetes mellitus without complication, without long-term current use of insulin (H) E11.9    4. Hypertension, benign essential, goal below 140/90 I10    5. Alcohol dependence with other alcohol-induced disorder (H) F10.288    6. Encounter for monitoring Suboxone maintenance therapy Z51.81     Z79.899    7. Anemia in chronic illness D63.8 Hemoglobin       RECOMMENDATIONS:       Anemia  Anemia and does not require treatment prior to surgery.  Monitor Hemoglobin postoperatively.      --Patient is to take Metoprolol the morning prior to surgery and will take Suboxone no later than 24 hours later prior to procedure.     APPROVAL GIVEN to proceed with proposed procedure, without further diagnostic evaluation     The information in this document, created by the medical scribe for me, accurately reflects the services I personally performed and the decisions made by me. I have reviewed and approved this document for accuracy prior to leaving the patient care area.  September 5, 2018 11:02 AM    Signed Electronically by: Ishaan West MD    Copy of this evaluation report is provided to requesting physician.    [facility] Preop Guidelines    Revised Cardiac Risk Index

## 2018-09-05 ENCOUNTER — OFFICE VISIT (OUTPATIENT)
Dept: FAMILY MEDICINE | Facility: CLINIC | Age: 63
End: 2018-09-05
Payer: COMMERCIAL

## 2018-09-05 VITALS
BODY MASS INDEX: 20.58 KG/M2 | DIASTOLIC BLOOD PRESSURE: 72 MMHG | OXYGEN SATURATION: 98 % | WEIGHT: 127.5 LBS | SYSTOLIC BLOOD PRESSURE: 148 MMHG | TEMPERATURE: 98.2 F | HEART RATE: 74 BPM | RESPIRATION RATE: 18 BRPM

## 2018-09-05 DIAGNOSIS — Z79.899 ENCOUNTER FOR MONITORING SUBOXONE MAINTENANCE THERAPY: ICD-10-CM

## 2018-09-05 DIAGNOSIS — Z01.818 PREOP GENERAL PHYSICAL EXAM: Primary | ICD-10-CM

## 2018-09-05 DIAGNOSIS — F17.210 CIGARETTE NICOTINE DEPENDENCE WITHOUT COMPLICATION: ICD-10-CM

## 2018-09-05 DIAGNOSIS — Z51.81 ENCOUNTER FOR MONITORING SUBOXONE MAINTENANCE THERAPY: ICD-10-CM

## 2018-09-05 DIAGNOSIS — F10.288 ALCOHOL DEPENDENCE WITH OTHER ALCOHOL-INDUCED DISORDER (H): ICD-10-CM

## 2018-09-05 DIAGNOSIS — D63.8 ANEMIA IN CHRONIC ILLNESS: ICD-10-CM

## 2018-09-05 DIAGNOSIS — I10 HYPERTENSION, BENIGN ESSENTIAL, GOAL BELOW 140/90: ICD-10-CM

## 2018-09-05 DIAGNOSIS — E11.9 TYPE 2 DIABETES MELLITUS WITHOUT COMPLICATION, WITHOUT LONG-TERM CURRENT USE OF INSULIN (H): ICD-10-CM

## 2018-09-05 LAB — HGB BLD-MCNC: 10.9 G/DL (ref 11.7–15.7)

## 2018-09-05 PROCEDURE — 99215 OFFICE O/P EST HI 40 MIN: CPT | Performed by: FAMILY MEDICINE

## 2018-09-05 PROCEDURE — 36416 COLLJ CAPILLARY BLOOD SPEC: CPT | Performed by: FAMILY MEDICINE

## 2018-09-05 PROCEDURE — 85018 HEMOGLOBIN: CPT | Performed by: FAMILY MEDICINE

## 2018-09-05 PROCEDURE — 93000 ELECTROCARDIOGRAM COMPLETE: CPT | Performed by: FAMILY MEDICINE

## 2018-09-05 NOTE — PROGRESS NOTES
Haroldo Nathan: good news! Your hemoglobin result is improving; I've addended your pre-op. Hope your procedure goes smoothly.     Ishaan

## 2018-09-05 NOTE — MR AVS SNAPSHOT
After Visit Summary   9/5/2018    Josette Palacios    MRN: [id number]           Patient Information     Date Of Birth          1955        Visit Information        Provider Department      9/5/2018 10:30 AM Ishaan West [place] [facility]        Today's Diagnoses     Preop general physical exam    -  1    Cigarette nicotine dependence without complication        Type 2 diabetes mellitus without complication, without long-term current use of insulin (H)        Hypertension, benign essential, goal below 140/90        Alcohol dependence with other alcohol-induced disorder (H)        Encounter for monitoring Suboxone maintenance therapy        Anemia in chronic illness          Care Instructions      Before Your Surgery      Call your surgeon if there is any change in your health. This includes signs of a cold or flu (such as a sore throat, runny nose, cough, rash or fever).    Do not smoke, drink alcohol or take over the counter medicine (unless your surgeon or primary care doctor tells you to) for the 24 hours before and after surgery.    If you take prescribed drugs: Follow your doctor s orders about which medicines to take and which to stop until after surgery.    Eating and drinking prior to surgery: follow the instructions from your surgeon    Take a shower or bath the night before surgery. Use the soap your surgeon gave you to gently clean your skin. If you do not have soap from your surgeon, use your regular soap. Do not shave or scrub the surgery site.  Wear clean pajamas and have clean sheets on your bed.           Follow-ups after your visit        Follow-up notes from your care team     Return in about 2 months (around 11/5/2018) for Routine Visit.      Your next 10 appointments already scheduled     Sep 13, 2018  7:30 AM CDT   US ABDOMEN LIMITED with UUUS2   [facility] [place], Ultrasound ([facility])    [address]  [facility] [id number] [phone]           Please bring a list of your medicines (including vitamins, minerals and over-the-counter drugs). Also, tell your doctor about any allergies you may have. Wear comfortable clothes and leave your valuables at home.  Adults: No eating or drinking for 8 hours before the exam. You may take medicine with a small sip of water.  Children: - Infants, breast-fed: may have breast milk up to 2 hours before exam. - Infants, formula: may have bottle until 4 hours before exam. - Children 1-5 years: No food or drink for 4 hours before exam. - Children 6 -12 years: No food or drink for 6 hours before exam. - Children over 12 years: No food or drink for 8 hours before exam. - J Tube Fed: No need to stop feedings.  Please call the Imaging Department at your exam site with any questions.            Sep 13, 2018   Procedure with Saige Villalta MD   [facility] [place], Endoscopy ([facility])    [address] [facility] [facility] [place] [id number] [phone]           The [facility] is located on the corner of [address] and [address] on the [facility]. It is easily accessible from virtually any point [place], via [address] and [address].            Nov 05, 2018 10:00 AM CST   Lab with  LAB   [facility] Lab ([facility])    [address]  1st Floor  [facility] [id number] [phone]            Nov 05, 2018 11:00 AM CST   (Arrive by 10:45 AM)   Return General Liver with Saige Villalta MD   [facility] Hepatology ([facility])    [address]  [address]  [facility] [id number] [phone]              Who to contact     If you have questions or need follow up information about today's clinic visit or your schedule please contact [facility] directly at [phone].  Normal or non-critical  lab and imaging results will be communicated to you by MyChart, letter or phone within 4 business days after the clinic has received the results. If you do not hear from us within 7 days, please contact the clinic through fg microtect or phone. If you have a critical or abnormal lab result, we will notify you by phone as soon as possible.  Submit refill requests through [facility] or call your pharmacy and they will forward the refill request to us. Please allow 3 business days for your refill to be completed.          Additional Information About Your Visit        [facility] Information     [facility] gives you secure access to your electronic health record. If you see a primary care provider, you can also send messages to your care team and make appointments. If you have questions, please call your primary care clinic.  If you do not have a primary care provider, please call [phone] and they will assist you.        Care EveryWhere ID     This is your Care EveryWhere ID. This could be used by other organizations to access your [place] medical records  [id number]        Your Vitals Were     Pulse Temperature Respirations Pulse Oximetry BMI (Body Mass Index)       74 98.2  F (36.8  C) (Tympanic) 18 98% 20.58 kg/m2        Blood Pressure from Last 3 Encounters:   09/05/18 148/72   07/02/18 176/86   04/27/18 126/60    Weight from Last 3 Encounters:   09/05/18 127 lb 8 oz (57.8 kg)   07/02/18 129 lb (58.5 kg)   04/27/18 135 lb 12.8 oz (61.6 kg)              We Performed the Following     EKG 12-lead complete w/read - Clinics     Hemoglobin        Primary Care Provider Office Phone # Fax #    Ishaan West [phone]       [address]  [facility] [address]        Equal Access to Services     ROSEANN MORALES : Alvaro Crawley, brijesh alexander, radha melchor, mack barney. So [facility] [phone].    ATENCIÓN: Si mar staton cho  disposición servicios gratuitos de asistencia lingüística. Gregg wayne [phone].    We comply with applicable federal civil rights laws and [place] laws. We do not discriminate on the basis of race, color, national origin, age, disability, sex, sexual orientation, or gender identity.            Thank you!     Thank you for choosing [facility]  [place]. Our goal is always to provide you with excellent care. Hearing back from our patients is one way we can continue to improve our services. Please take a few minutes to complete the written survey that you may receive in the mail after your visit with us. Thank you!             Your Updated Medication List - Protect others around you: Learn how to safely use, store and throw away your medicines at www.disposemymeds.org.          This list is accurate as of 9/5/18  2:49 PM.  Always use your most recent med list.                   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    Hyperglycemia       * buprenorphine-naloxone 2-0.5 MG Subl sublingual tablet    SUBOXONE    60 tablet    PLACE 1 TABLET UNDER THE TONGUE TWICE DAILY    Encounter for monitoring Suboxone maintenance therapy       * buprenorphine-naloxone 2-0.5 MG Subl sublingual tablet    SUBOXONE    60 tablet    PLACE 1 TABLET UNDER TONGUE TWICE DAILY    Narcotic dependence (H)       clindamycin 150 MG capsule    CLEOCIN    30 capsule    Take 1 capsule (150 mg) by mouth 3 times daily    Cellulitis of other specified site        DULoxetine 20 MG EC capsule    CYMBALTA    180 capsule    TAKE 1 CAPSULE BY MOUTH TWICE DAILY    Anxiety associated with depression       lactulose encephalopathy 10 GM/15ML SOLUTION    CHRONULAC    5700 mL    Take 45 mLs (30 g) by mouth 3 times daily    Chronic hepatitis C (H), Hepatic encephalopathy (H), Alcoholic cirrhosis of liver (H)       metoprolol succinate 25 MG 24 hr tablet    TOPROL-XL    90 tablet    TAKE 1 TABLET(25 MG) BY MOUTH AT BEDTIME    Hypertension, benign essential, goal below 140/90       mupirocin 2 % cream    BACTROBAN    30 g    APPLY EXTERNALLY TO THE AFFECTED AREA THREE TIMES DAILY    Neurodermatitis       nicotine polacrilex 4 MG lozenge    RA MINI NICOTINE    360 tablet    Place 1 lozenge (4 mg) inside cheek as needed for smoking cessation    Cigarette nicotine dependence without complication       order for DME     1 Device    Equipment being ordered: Home blood pressure cuff    Hypertension, benign essential, goal below 140/90       pramipexole 0.125 MG tablet    MIRAPEX    90 tablet    TAKE 1 TABLET(0.125 MG) BY MOUTH AT BEDTIME    Restless legs syndrome (RLS)       sertraline 50 MG tablet    ZOLOFT    135 tablet    TAKE 1 AND 1/2 TABLETS(75 MG) BY MOUTH DAILY    Anxiety associated with depression       sofosbuvir-velpatasvir 400-100 MG per tablet    EPCLUSA    28 tablet    Take 1 tablet by mouth daily    Chronic hepatitis C without hepatic coma (H)       * traMADol 50 MG tablet    ULTRAM    28 tablet    0.5  to 1 tab once daily as needed for severe pain        * traMADol 50 MG tablet    ULTRAM    28 tablet    TAKE 1 TABLET BY MOUTH TWICE DAILY AS NEEDED FOR MODERATE PAIN    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  Use sunscreen SPF>20.    Age-related facial wrinkles       varenicline 0.5 MG X 11 & 1 MG X 42 tablet    CHANTIX STARTING MONTH BYRON    53 tablet    Take 0.5 mg tab daily for 3 days, then 0.5 mg tab twice daily for 4 days, then 1 mg twice daily.    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 Read the directions carefully, and ask your doctor or other care provider to review them with you.

## 2018-09-10 ENCOUNTER — TELEPHONE (OUTPATIENT)
Dept: GASTROENTEROLOGY | Facility: CLINIC | Age: 63
End: 2018-09-10

## 2018-09-10 DIAGNOSIS — Z12.11 ENCOUNTER FOR SCREENING COLONOSCOPY: Primary | ICD-10-CM

## 2018-09-10 NOTE — TELEPHONE ENCOUNTER
Pt requests EGD & Colonoscopy instructions communication via unencrypted e-mail. This includes EGD et split-dose Golytely instructions.  Pt acknowledges that they have agreed to accept the risks and receive unencrypted communications for this incidence.     Violet Del Toro RN  [facility] Endoscopy

## 2018-09-10 NOTE — PATIENT INSTRUCTIONS
-Take over the counter zyrtec once a day, and apply the bactroban and over the counter diphenhydramine cream to the affected area to help with the itching.  -Please return in 1 month for a recheck.  -If the rash gets swollen and tender or if you develop a fever, please let me know or go to the emergency room so that you can be started on antibiotics.   Patient called for Dr. Samantha White. patient said she is supposed to have Physical Therapy  tomorrow and Thursday. That her back has been hurting. Patient would like to know if [facility]  still wants her go to the Therapy or should she cancel until she is seen on 09/18/18 by TACHO salter. Patient tel. [phone]. Monica Zimmerman

## 2018-09-13 ENCOUNTER — HOSPITAL ENCOUNTER (OUTPATIENT)
Dept: ULTRASOUND IMAGING | Facility: CLINIC | Age: 63
End: 2018-09-13
Attending: INTERNAL MEDICINE
Payer: COMMERCIAL

## 2018-09-13 ENCOUNTER — SURGERY (OUTPATIENT)
Age: 63
End: 2018-09-13

## 2018-09-13 ENCOUNTER — ANESTHESIA (OUTPATIENT)
Dept: GASTROENTEROLOGY | Facility: CLINIC | Age: 63
End: 2018-09-13
Payer: COMMERCIAL

## 2018-09-13 ENCOUNTER — ANESTHESIA EVENT (OUTPATIENT)
Dept: GASTROENTEROLOGY | Facility: CLINIC | Age: 63
End: 2018-09-13
Payer: COMMERCIAL

## 2018-09-13 ENCOUNTER — HOSPITAL ENCOUNTER (OUTPATIENT)
Facility: CLINIC | Age: 63
Discharge: HOME | End: 2018-09-13
Attending: INTERNAL MEDICINE | Admitting: INTERNAL MEDICINE
Payer: COMMERCIAL

## 2018-09-13 VITALS
OXYGEN SATURATION: 95 % | RESPIRATION RATE: 24 BRPM | WEIGHT: 126.5 LBS | BODY MASS INDEX: 20.42 KG/M2 | TEMPERATURE: 97.5 F | DIASTOLIC BLOOD PRESSURE: 59 MMHG | SYSTOLIC BLOOD PRESSURE: 97 MMHG

## 2018-09-13 DIAGNOSIS — K70.31 ALCOHOLIC CIRRHOSIS OF LIVER WITH ASCITES (H): ICD-10-CM

## 2018-09-13 LAB
COLONOSCOPY: NORMAL
GLUCOSE BLDC GLUCOMTR-MCNC: 81 MG/DL (ref 70–99)
GLUCOSE BLDC GLUCOMTR-MCNC: 82 MG/DL (ref 70–99)
UPPER GI ENDOSCOPY: NORMAL

## 2018-09-13 PROCEDURE — 37000008 ZZH ANESTHESIA TECHNICAL FEE, 1ST 30 MIN: Performed by: INTERNAL MEDICINE

## 2018-09-13 PROCEDURE — 43235 EGD DIAGNOSTIC BRUSH WASH: CPT | Performed by: INTERNAL MEDICINE

## 2018-09-13 PROCEDURE — 37000009 ZZH ANESTHESIA TECHNICAL FEE, EACH ADDTL 15 MIN: Performed by: INTERNAL MEDICINE

## 2018-09-13 PROCEDURE — 82962 GLUCOSE BLOOD TEST: CPT

## 2018-09-13 PROCEDURE — 25000125 ZZHC RX 250: Performed by: NURSE ANESTHETIST, CERTIFIED REGISTERED

## 2018-09-13 PROCEDURE — 88305 TISSUE EXAM BY PATHOLOGIST: CPT | Performed by: INTERNAL MEDICINE

## 2018-09-13 PROCEDURE — 25000128 H RX IP 250 OP 636: Performed by: NURSE ANESTHETIST, CERTIFIED REGISTERED

## 2018-09-13 PROCEDURE — 76705 ECHO EXAM OF ABDOMEN: CPT

## 2018-09-13 PROCEDURE — 45385 COLONOSCOPY W/LESION REMOVAL: CPT | Performed by: INTERNAL MEDICINE

## 2018-09-13 PROCEDURE — 25000132 ZZH RX MED GY IP 250 OP 250 PS 637: Performed by: INTERNAL MEDICINE

## 2018-09-13 PROCEDURE — 40000141 ZZH STATISTIC PERIPHERAL IV START W/O US GUIDANCE

## 2018-09-13 RX ORDER — SODIUM CHLORIDE, SODIUM LACTATE, POTASSIUM CHLORIDE, CALCIUM CHLORIDE 600; 310; 30; 20 MG/100ML; MG/100ML; MG/100ML; MG/100ML
INJECTION, SOLUTION INTRAVENOUS CONTINUOUS
Status: CANCELLED | OUTPATIENT
Start: 2018-09-13

## 2018-09-13 RX ORDER — PROPOFOL 10 MG/ML
INJECTION, EMULSION INTRAVENOUS PRN
Status: DISCONTINUED | OUTPATIENT
Start: 2018-09-13 | End: 2018-09-13

## 2018-09-13 RX ORDER — NALOXONE HYDROCHLORIDE 0.4 MG/ML
.1-.4 INJECTION, SOLUTION INTRAMUSCULAR; INTRAVENOUS; SUBCUTANEOUS
Status: CANCELLED | OUTPATIENT
Start: 2018-09-13 | End: 2018-09-14

## 2018-09-13 RX ORDER — FENTANYL CITRATE 50 UG/ML
INJECTION, SOLUTION INTRAMUSCULAR; INTRAVENOUS PRN
Status: DISCONTINUED | OUTPATIENT
Start: 2018-09-13 | End: 2018-09-13

## 2018-09-13 RX ORDER — LIDOCAINE HYDROCHLORIDE 20 MG/ML
INJECTION, SOLUTION INFILTRATION; PERINEURAL PRN
Status: DISCONTINUED | OUTPATIENT
Start: 2018-09-13 | End: 2018-09-13

## 2018-09-13 RX ORDER — SIMETHICONE
LIQUID (ML) MISCELLANEOUS PRN
Status: DISCONTINUED | OUTPATIENT
Start: 2018-09-13 | End: 2018-09-13 | Stop reason: HOSPADM

## 2018-09-13 RX ORDER — SODIUM CHLORIDE, SODIUM LACTATE, POTASSIUM CHLORIDE, CALCIUM CHLORIDE 600; 310; 30; 20 MG/100ML; MG/100ML; MG/100ML; MG/100ML
INJECTION, SOLUTION INTRAVENOUS CONTINUOUS PRN
Status: DISCONTINUED | OUTPATIENT
Start: 2018-09-13 | End: 2018-09-13

## 2018-09-13 RX ORDER — PROPOFOL 10 MG/ML
INJECTION, EMULSION INTRAVENOUS CONTINUOUS PRN
Status: DISCONTINUED | OUTPATIENT
Start: 2018-09-13 | End: 2018-09-13

## 2018-09-13 RX ORDER — ONDANSETRON 2 MG/ML
4 INJECTION INTRAMUSCULAR; INTRAVENOUS EVERY 30 MIN PRN
Status: CANCELLED | OUTPATIENT
Start: 2018-09-13

## 2018-09-13 RX ORDER — ONDANSETRON 4 MG/1
4 TABLET, ORALLY DISINTEGRATING ORAL EVERY 30 MIN PRN
Status: CANCELLED | OUTPATIENT
Start: 2018-09-13

## 2018-09-13 RX ORDER — MEPERIDINE HYDROCHLORIDE 50 MG/ML
12.5 INJECTION INTRAMUSCULAR; INTRAVENOUS; SUBCUTANEOUS
Status: CANCELLED | OUTPATIENT
Start: 2018-09-13

## 2018-09-13 RX ADMIN — PROPOFOL 100 MCG/KG/MIN: 10 INJECTION, EMULSION INTRAVENOUS at 09:40

## 2018-09-13 RX ADMIN — LIDOCAINE HYDROCHLORIDE 40 MG: 20 INJECTION, SOLUTION INFILTRATION; PERINEURAL at 09:39

## 2018-09-13 RX ADMIN — TOPICAL ANESTHETIC 1 EACH: 200 SPRAY DENTAL; PERIODONTAL at 09:39

## 2018-09-13 RX ADMIN — FENTANYL CITRATE 50 MCG: 50 INJECTION, SOLUTION INTRAMUSCULAR; INTRAVENOUS at 09:43

## 2018-09-13 RX ADMIN — PROPOFOL 30 MG: 10 INJECTION, EMULSION INTRAVENOUS at 09:40

## 2018-09-13 RX ADMIN — SODIUM CHLORIDE, POTASSIUM CHLORIDE, SODIUM LACTATE AND CALCIUM CHLORIDE: 600; 310; 30; 20 INJECTION, SOLUTION INTRAVENOUS at 09:33

## 2018-09-13 RX ADMIN — LIDOCAINE HYDROCHLORIDE 5 ML: 20 SOLUTION ORAL; TOPICAL at 09:33

## 2018-09-13 RX ADMIN — PROPOFOL 20 MG: 10 INJECTION, EMULSION INTRAVENOUS at 09:43

## 2018-09-13 RX ADMIN — MIDAZOLAM 2 MG: 1 INJECTION INTRAMUSCULAR; INTRAVENOUS at 09:33

## 2018-09-13 RX ADMIN — Medication 2 ML: at 09:21

## 2018-09-13 ASSESSMENT — LIFESTYLE VARIABLES: TOBACCO_USE: 1

## 2018-09-13 NOTE — OR NURSING
EGD under MAC, no interventions.  Pt tolerated procedure    Colonoscopy under MAC.  4 polyps removed with hot snare, ERBI settings 2/25, skin intact after grounding pad removed.  Pt tolerated procedure.

## 2018-09-13 NOTE — ANESTHESIA CARE TRANSFER NOTE
Patient: Josette Palacios    Procedure(s):  EGD/Colonoscopy  - Wound Class: II-Clean Contaminated   - Wound Class: II-Clean Contaminated    Diagnosis: Alcoholic cirrhosis of liver with ascites w/MAC  Diagnosis Additional Information: No value filed.    Anesthesia Type:   MAC     Note:    Patient transferred to:Phase II  Comments: Anesthesia Care Transfer Note    Patient: Josette Palacios    Transferred to:endo    Patient vital signs: stable    Airway: none    Monitors on, breathing spontaneously, report to RALEIGH Barton CRNA  9/13/2018 10:20 AM    Handoff Report: Identifed the Patient, Identified the Reponsible Provider, Reviewed the pertinent medical history, Discussed the surgical course, Reviewed Intra-OP anesthesia mangement and issues during anesthesia, Set expectations for post-procedure period and Allowed opportunity for questions and acknowledgement of understanding      Vitals: (Last set prior to Anesthesia Care Transfer)    CRNA VITALS  9/13/2018 0944 - 9/13/2018 1020      9/13/2018             Pulse: 71    Ht Rate: 68    SpO2: 100 %                Electronically Signed By: SEAN Rooney CRNA  September 13, 2018  10:20 AM

## 2018-09-13 NOTE — IP AVS SNAPSHOT
MRN:[id number]                      After Visit Summary   9/13/2018    Josette Palacios    MRN: [id number]           Thank you!     Thank you for choosing [facility] for your care. Our goal is always to provide you with excellent care. Hearing back from our patients is one way we can continue to improve our services. Please take a few minutes to complete the written survey that you may receive in the mail after you visit with us. Thank you!        Patient Information     Date Of Birth          1955        About your hospital stay     You were admitted on:  September 13, 2018 You last received care in the:  [facility], Endoscopy    You were discharged on:  September 13, 2018       Who to Call     For medical emergencies, please call 911.  For non-urgent questions about your medical care, please call your primary care provider or clinic, [phone]  For questions related to your surgery, please call your surgery clinic        Attending Provider     Provider Specialty    Saige Villalta MD Internal Medicine       Primary Care Provider Office Phone # Fax #    [facility] [phone]      Your next 10 appointments already scheduled     Nov 05, 2018 10:00 AM CST   Lab with  LAB   [facility] Lab ([facility])    [address]  1st Floor  [place] [facility] [address] [phone]            Nov 05, 2018 11:00 AM CST   (Arrive by 10:45 AM)   Return General Liver with Saige Villalta MD   [facility] Hepatology ([facility])    [address]  [address]  [place] [address] [phone]              Further instructions from your care team       [facility] Upper Endoscopy (EGD) with Monitored Anesthesia Care  For 24 hours after your procedure  Sedation:  1. Get plenty of rest. A responsible adult must stay with you for at least 24 hours after you leave the hospital.   2. Do not drive or use  heavy equipment. If you have weakness or tingling, don't drive or use heavy equipment until this feeling goes away.  3. Do not drink alcohol.  4. Avoid strenuous or risky activities. Ask for help when climbing stairs.   5. You may feel lightheaded. IF so, sit for a few minutes before standing. Have someone help you get up.   6. If you have nausea (feel sick to your stomach): Drink only clear liquids such as apple juice, ginger ale, broth or 7-Up. Rest may also help. Be sure to drink enough fluids. Move to a regular diet as you feel able.  7. You may have a slight fever. Call the doctor if your fever is over 100 F (37.7 C) (taken under the tongue) or lasts longer than 24 hours.  8. You may have a dry mouth, a sore throat, muscle aches or trouble sleeping. These should go away after 24 hours.  9. Do not make important or legal decisions.   Procedural:  1. Wait one hour before eating or drinking. Start with sips of water. When your gag reflex has returned, you may return to your normal diet, medicines, and light exercise.  2. Some bloating is normal. You may have large burps or pass air.  3. You may have a sore throat for 2 to 3 days. If so, it may help to:    Avoid hot liquids for 24 hours.    Use sore throat lozenges.    Gargle as need with salt water up to 4 times a day. Mix 1 cup of warm water with 1 teaspoon of salt. Do not swallow.    Call right away if you have:  1. Unusual pain in belly or chest pain not relieved with passing air.  2. Severe throat pain or trouble swallowing.  3. Black stools (tar-like looking bowel movement).  4. Headache for over 24 hours.    Follow-up: per doctor orders  If you have severe pain, bleeding, vomit blood, or shortness of breath, go to an emergency room.  If you have questions, call:  Endoscopy: Monday to Friday, 7 a.m. to 4:30 p.m. [phone] (We may have to call you back)  After hours: Hospital  [phone] (Ask for the GI fellow on call)      [facility]  Colonoscopy/Flexible Sigmoidoscopy with Monitored Anesthesia Care  For 24 hours after your procedure  Sedation:  10. Get plenty of rest. A responsible adult must stay with you for at least 24 hours after you leave the hospital.   11. Do not drive or use heavy equipment. If you have weakness or tingling, don't drive or use heavy equipment until this feeling goes away.  12. Do not drink alcohol.  13. Avoid strenuous or risky activities. Ask for help when climbing stairs.   14. You may feel lightheaded. IF so, sit for a few minutes before standing. Have someone help you get up.   15. If you have nausea (feel sick to your stomach): Drink only clear liquids such as apple juice, ginger ale, broth or 7-Up. Rest may also help. Be sure to drink enough fluids. Move to a regular diet as you feel able.  16. You may have a slight fever. Call the doctor if your fever is over 100 F (37.7 C) (taken under the tongue) or lasts longer than 24 hours.  17. You may have a dry mouth, a sore throat, muscle aches or trouble sleeping. These should go away after 24 hours.  18. Do not make important or legal decisions.   Procedural:  4. You may return to your normal diet and medicines.  5. Air was placed in your colon during the exam in order to see it. Walking helps to pass the air.  6. You may take Tylenol (acetaminophen) for pain unless your doctor has told you not to.   Do not take aspirin or ibuprofen (Advil, Motrin, or other anti-inflammatory  drugs) for __3___ days.  Call your doctor for any of the following  5. Unusual pain in belly or chest pain not relieved with passing air.  6. More than 1 to 2 Tablespoons of bleeding from your rectum.  7. It has been over 8 to 10 hours since surgery and you are still not able to urinate (pass water).  8. Headache for over 24 hours.    Follow-up:  __x__ We took small tissue samples or polyps to study. Your doctor will call you with the results within two weeks.  If you have severe pain, bleeding, or  shortness of breath, go to an emergency room.  If you have questions, call:  Endoscopy: Monday to Friday, 7 a.m. to 4:30 p.m. [phone] (We may have to call you back)  After hours: Hospital  [phone] (Ask for the GI fellow on call)            Pending Results     Date and Time Order Name Status Description    9/13/2018 0733 US Abdomen Limited* In process             Admission Information     Date & Time Provider Department Dept. Phone    9/13/2018 Saige Vlilalta MD [facility], [place], Endoscopy [phone]      Your Vitals Were     Blood Pressure Respirations Weight Pulse Oximetry BMI (Body Mass Index)       139/87 15 57.4 kg (126 lb 8 oz) 99% 20.42 kg/m2       MyChart Information     [facility] gives you secure access to your electronic health record. If you see a primary care provider, you can also send messages to your care team and make appointments. If you have questions, please call your primary care clinic.  If you do not have a primary care provider, please call [phone] and they will assist you.        Care EveryWhere ID     This is your Care EveryWhere ID. This could be used by other organizations to access your [place] medical records  [id number]        Equal Access to Services     ROSEANN MORALES : Hadii joey arreguino Sobettinaali, waaxda luqadaha, qaybta kaalmada adeegyada, mack barney. So [facility] [phone].    ATENCIÓN: Si habla español, tiene a cho disposición servicios gratuitos de asistencia lingüística. Llame al [phone].    We comply with applicable federal civil rights laws and [place] laws. We do not discriminate on the basis of race, color, national origin, age, disability, sex, sexual orientation, or gender identity.               Review of your medicines      UNREVIEWED medicines. 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, unspecified fracture morphology, subsequent encounter        TAKE FOUR CAPSULES BY MOUTH 1 HOUR BEFORE DENTAL APPOINTMENT   Quantity:  4 capsule   Refills:  0       * buprenorphine-naloxone 2-0.5 MG Subl sublingual tablet   Commonly known as:  SUBOXONE   Used for:  Encounter for monitoring Suboxone maintenance therapy        PLACE 1 TABLET UNDER THE TONGUE TWICE DAILY   Quantity:  60 tablet   Refills:  1       * buprenorphine-naloxone 2-0.5 MG Subl sublingual tablet   Commonly known as:  SUBOXONE   Used for:  Narcotic dependence (H)        PLACE 1 TABLET UNDER TONGUE TWICE DAILY   Quantity:  60 tablet   Refills:  0       clindamycin 150 MG capsule   Commonly known as:  CLEOCIN   Used for:  Cellulitis of other specified site        Dose:  150 mg   Take 1 capsule (150 mg) by mouth 3 times daily   Quantity:  30 capsule   Refills:  0       DULoxetine 20 MG EC capsule   Commonly known as:  CYMBALTA   Used for:  Anxiety associated with depression        TAKE 1 CAPSULE BY MOUTH TWICE DAILY   Quantity:  180 capsule   Refills:  2       lactulose encephalopathy 10 GM/15ML SOLUTION   Commonly known as:  CHRONULAC   Used for:  Chronic hepatitis C (H), Hepatic encephalopathy (H), Alcoholic cirrhosis of liver (H)        Dose:  45 mL   Take 45 mLs (30 g) by mouth 3 times daily   Quantity:  5700 mL   Refills:  2       metoprolol succinate 25 MG 24 hr tablet   Commonly known as:  TOPROL-XL   Used for:  Hypertension, benign essential, goal below 140/90        TAKE 1 TABLET(25 MG) BY MOUTH AT BEDTIME   Quantity:  90 tablet   Refills:  3       mupirocin 2 % cream   Commonly known as:  BACTROBAN   Used for:  Neurodermatitis        APPLY EXTERNALLY TO THE AFFECTED AREA THREE TIMES DAILY   Quantity:  30 g   Refills:  0       nicotine polacrilex 4 MG lozenge   Commonly known as:  RA MINI  NICOTINE   Used for:  Cigarette nicotine dependence without complication        Dose:  4 mg   Place 1 lozenge (4 mg) inside cheek as needed for smoking cessation   Quantity:  360 tablet   Refills:  1       pramipexole 0.125 MG tablet   Commonly known as:  MIRAPEX   Used for:  Restless legs syndrome (RLS)        TAKE 1 TABLET(0.125 MG) BY MOUTH AT BEDTIME   Quantity:  90 tablet   Refills:  3       sertraline 50 MG tablet   Commonly known as:  ZOLOFT   Used for:  Anxiety associated with depression        TAKE 1 AND 1/2 TABLETS(75 MG) BY MOUTH DAILY   Quantity:  135 tablet   Refills:  0       sofosbuvir-velpatasvir 400-100 MG per tablet   Commonly known as:  EPCLUSA   Used for:  Chronic hepatitis C without hepatic coma (H)        Dose:  1 tablet   Take 1 tablet by mouth daily   Quantity:  28 tablet   Refills:  2       * traMADol 50 MG tablet   Commonly known as:  ULTRAM        0.5  to 1 tab once daily as needed for severe pain   Quantity:  28 tablet   Refills:  0       * traMADol 50 MG tablet   Commonly known as:  ULTRAM   Used for:  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  Use sunscreen SPF>20.   Quantity:  60 g   Refills:  3       varenicline 0.5 MG X 11 & 1 MG X 42 tablet   Commonly known as:  CHANTIX STARTING MONTH PAK   Used for:  Tobacco abuse        Take 0.5 mg tab daily for 3 days, then 0.5 mg tab twice daily for 4 days, then 1 mg twice daily.   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 Read the directions carefully, and ask your doctor or other care provider to review them with you.      CONTINUE these medicines which have NOT CHANGED        Dose / Directions    blood glucose monitoring test strip   Commonly known as:  ACCU-CHEK CHASE   Used for:  Hyperglycemia        Use to test blood sugar 3 times daily or as directed.   Quantity:  100 each   Refills:  5       order for DME   Used for:  Hypertension, benign essential, goal below 140/90        Equipment being ordered: Home blood pressure cuff   Quantity:  1 Device   Refills:  0                Protect others around you: Learn how to safely use, store and throw away your medicines at www.disposemymeds.org.             Medication List: This is a list of all your medications and when to take them. Check marks below indicate your daily home schedule. Keep this list as a reference.      Medications           Morning Afternoon Evening Bedtime As Needed    ACE/ARB/ARNI NOT PRESCRIBED (INTENTIONAL)   ACE & ARB not prescribed due to Refusal by patient                                amoxicillin 500 MG capsule   Commonly known as:  AMOXIL   TAKE FOUR CAPSULES BY MOUTH 1 HOUR BEFORE DENTAL APPOINTMENT                                blood glucose monitoring test strip   Commonly known as:  ACCU-CHEK CHASE   Use to test blood sugar 3 times daily or as directed.                                * buprenorphine-naloxone 2-0.5 MG Subl sublingual tablet   Commonly known as:  SUBOXONE   PLACE 1 TABLET UNDER THE TONGUE TWICE DAILY                                * buprenorphine-naloxone 2-0.5 MG Subl sublingual tablet    Commonly known as:  SUBOXONE   PLACE 1 TABLET UNDER TONGUE TWICE DAILY                                clindamycin 150 MG capsule   Commonly known as:  CLEOCIN   Take 1 capsule (150 mg) by mouth 3 times daily                                DULoxetine 20 MG EC capsule   Commonly known as:  CYMBALTA   TAKE 1 CAPSULE BY MOUTH TWICE DAILY                                lactulose encephalopathy 10 GM/15ML SOLUTION   Commonly known as:  CHRONULAC   Take 45 mLs (30 g) by mouth 3 times daily                                metoprolol succinate 25 MG 24 hr tablet   Commonly known as:  TOPROL-XL   TAKE 1 TABLET(25 MG) BY MOUTH AT BEDTIME                                mupirocin 2 % cream   Commonly known as:  BACTROBAN   APPLY EXTERNALLY TO THE AFFECTED AREA THREE TIMES DAILY                                nicotine polacrilex 4 MG lozenge   Commonly known as:  RA MINI NICOTINE   Place 1 lozenge (4 mg) inside cheek as needed for smoking cessation                                order for DME   Equipment being ordered: Home blood pressure cuff                                pramipexole 0.125 MG tablet   Commonly known as:  MIRAPEX   TAKE 1 TABLET(0.125 MG) BY MOUTH AT BEDTIME                                sertraline 50 MG tablet   Commonly known as:  ZOLOFT   TAKE 1 AND 1/2 TABLETS(75 MG) BY MOUTH DAILY                                sofosbuvir-velpatasvir 400-100 MG per tablet   Commonly known as:  EPCLUSA   Take 1 tablet by mouth daily                                * traMADol 50 MG tablet   Commonly known as:  ULTRAM   0.5  to 1 tab once daily as needed for severe pain                                * traMADol 50 MG tablet   Commonly known as:  ULTRAM   TAKE 1 TABLET BY MOUTH TWICE DAILY AS NEEDED FOR MODERATE PAIN                                traZODone 50 MG tablet   Commonly known as:  DESYREL   TAKE 1 TABLET(50 MG) BY MOUTH EVERY EVENING AS NEEDED FOR SLEEP                                tretinoin 0.025 % cream    Commonly known as:  RETIN-A   Spread a pea size amount into affected area topically at bedtime.  Use sunscreen SPF>20.                                varenicline 0.5 MG X 11 & 1 MG X 42 tablet   Commonly known as:  CHANTIX STARTING MONTH BYRON   Take 0.5 mg tab daily for 3 days, then 0.5 mg tab twice daily for 4 days, then 1 mg twice daily.                                VITAMIN D (CHOLECALCIFEROL) PO   Take 5,000 Units by mouth daily                                * Notice:  This list has 4 medication(s) that are the same as other medications prescribed for you. Read the directions carefully, and ask your doctor or other care provider to review them with you.

## 2018-09-13 NOTE — ANESTHESIA POSTPROCEDURE EVALUATION
Patient: Josette Palacios    Procedure(s):  EGD/Colonoscopy  - Wound Class: II-Clean Contaminated   - Wound Class: II-Clean Contaminated    Diagnosis:Alcoholic cirrhosis of liver with ascites w/MAC  Diagnosis Additional Information: No value filed.    Anesthesia Type:  MAC    Note:  Anesthesia Post Evaluation    Patient location during evaluation: PACU  Patient participation: Able to fully participate in evaluation  Level of consciousness: awake  Pain management: satisfactory to patient  Airway patency: patent  Cardiovascular status: acceptable  Respiratory status: acceptable  Hydration status: acceptable  PONV: none     Anesthetic complications: None          Last vitals:  Vitals:    09/13/18 0908   BP: 151/57   Resp: 10   SpO2: 97%         Electronically Signed By: Van Badillo MD  September 13, 2018  10:23 AM

## 2018-09-13 NOTE — DISCHARGE INSTRUCTIONS
[facility] Upper Endoscopy (EGD) with Monitored Anesthesia Care  For 24 hours after your procedure  Sedation:  1. Get plenty of rest. A responsible adult must stay with you for at least 24 hours after you leave the hospital.   2. Do not drive or use heavy equipment. If you have weakness or tingling, don't drive or use heavy equipment until this feeling goes away.  3. Do not drink alcohol.  4. Avoid strenuous or risky activities. Ask for help when climbing stairs.   5. You may feel lightheaded. IF so, sit for a few minutes before standing. Have someone help you get up.   6. If you have nausea (feel sick to your stomach): Drink only clear liquids such as apple juice, ginger ale, broth or 7-Up. Rest may also help. Be sure to drink enough fluids. Move to a regular diet as you feel able.  7. You may have a slight fever. Call the doctor if your fever is over 100 F (37.7 C) (taken under the tongue) or lasts longer than 24 hours.  8. You may have a dry mouth, a sore throat, muscle aches or trouble sleeping. These should go away after 24 hours.  9. Do not make important or legal decisions.   Procedural:  1. Wait one hour before eating or drinking. Start with sips of water. When your gag reflex has returned, you may return to your normal diet, medicines, and light exercise.  2. Some bloating is normal. You may have large burps or pass air.  3. You may have a sore throat for 2 to 3 days. If so, it may help to:    Avoid hot liquids for 24 hours.    Use sore throat lozenges.    Gargle as need with salt water up to 4 times a day. Mix 1 cup of warm water with 1 teaspoon of salt. Do not swallow.    Call right away if you have:  1. Unusual pain in belly or chest pain not relieved with passing air.  2. Severe throat pain or trouble swallowing.  3. Black stools (tar-like looking bowel movement).  4. Headache for over 24 hours.    Follow-up: per doctor orders  If you have severe pain, bleeding, vomit blood, or shortness of breath, go to an  emergency room.  If you have questions, call:  Endoscopy: Monday to Friday, 7 a.m. to 4:30 p.m. [phone] (We may have to call you back)  After hours: Hospital  [phone] (Ask for the GI fellow on call)      [facility] Colonoscopy/Flexible Sigmoidoscopy with Monitored Anesthesia Care  For 24 hours after your procedure  Sedation:  10. Get plenty of rest. A responsible adult must stay with you for at least 24 hours after you leave the hospital.   11. Do not drive or use heavy equipment. If you have weakness or tingling, don't drive or use heavy equipment until this feeling goes away.  12. Do not drink alcohol.  13. Avoid strenuous or risky activities. Ask for help when climbing stairs.   14. You may feel lightheaded. IF so, sit for a few minutes before standing. Have someone help you get up.   15. If you have nausea (feel sick to your stomach): Drink only clear liquids such as apple juice, ginger ale, broth or 7-Up. Rest may also help. Be sure to drink enough fluids. Move to a regular diet as you feel able.  16. You may have a slight fever. Call the doctor if your fever is over 100 F (37.7 C) (taken under the tongue) or lasts longer than 24 hours.  17. You may have a dry mouth, a sore throat, muscle aches or trouble sleeping. These should go away after 24 hours.  18. Do not make important or legal decisions.   Procedural:  4. You may return to your normal diet and medicines.  5. Air was placed in your colon during the exam in order to see it. Walking helps to pass the air.  6. You may take Tylenol (acetaminophen) for pain unless your doctor has told you not to.   Do not take aspirin or ibuprofen (Advil, Motrin, or other anti-inflammatory  drugs) for __3___ days.  Call your doctor for any of the following  5. Unusual pain in belly or chest pain not relieved with passing air.  6. More than 1 to 2 Tablespoons of bleeding from your rectum.  7. It has been over 8 to 10 hours since surgery and you are still not able  to urinate (pass water).  8. Headache for over 24 hours.    Follow-up:  __x__ We took small tissue samples or polyps to study. Your doctor will call you with the results within two weeks.  If you have severe pain, bleeding, or shortness of breath, go to an emergency room.  If you have questions, call:  Endoscopy: Monday to Friday, 7 a.m. to 4:30 p.m. [phone] (We may have to call you back)  After hours: Hospital  [phone] (Ask for the GI fellow on call)

## 2018-09-13 NOTE — IP AVS SNAPSHOT
[facility], [place], Endoscopy    [address] [address]    Phone:  [phone]                                       After Visit Summary   9/13/2018    Josette Palacios    MRN: [id number]           After Visit Summary Signature Page     I have received my discharge instructions, and my questions have been answered. I have discussed any challenges I see with this plan with the nurse or doctor.    ..........................................................................................................................................  Patient/Patient Representative Signature      ..........................................................................................................................................  Patient Representative Print Name and Relationship to Patient    ..................................................               ................................................  Date                                   Time    ..........................................................................................................................................  Reviewed by Signature/Title    ...................................................              ..............................................  Date                                               Time          22EPIC Rev 08/18

## 2018-09-13 NOTE — ANESTHESIA PREPROCEDURE EVALUATION
Anesthesia Evaluation     . Pt has had prior anesthetic. Type: General    No history of anesthetic complications          ROS/MED HX    ENT/Pulmonary:     (+)tobacco use, Current use , . .    Neurologic:     (+)other neuro hepatic encephalopathy    Cardiovascular:     (+) Dyslipidemia, hypertension----. : . . . :. .       METS/Exercise Tolerance:     Hematologic:  - neg hematologic  ROS       Musculoskeletal:   (+) , , other musculoskeletal- left tibia fx      GI/Hepatic:     (+) liver disease, Other GI/Hepatic alcoholic cirrhosis with ascites,       Renal/Genitourinary:         Endo:     (+) type II DM .   (-) thyroid disease   Psychiatric:     (+) psychiatric history anxiety and depression      Infectious Disease:  - neg infectious disease ROS       Malignancy:      - no malignancy   Other: Comment: Taking suboxone                    Physical Exam  Normal systems: cardiovascular and pulmonary    Airway   Mallampati: I  TM distance: >3 FB  Neck ROM: full    Dental   (+) chipped and missing    Cardiovascular       Pulmonary                     Anesthesia Plan      History & Physical Review      ASA Status:  3 .    NPO Status:  > 6 hours    Plan for MAC with Intravenous and Propofol induction. Maintenance will be TIVA.  Reason for MAC:  Difficulty with conscious sedation (QS)  PONV prophylaxis:  Ondansetron (or other 5HT-3) and Dexamethasone or Solumedrol       Postoperative Care  Postoperative pain management:  IV analgesics and Multi-modal analgesia.      Consents  Anesthetic plan, risks, benefits and alternatives discussed with:  Patient..

## 2018-09-13 NOTE — LETTER
September 17, 2018       TO: Josette Palacios  [address]  [facility] [address]       Dear Ms. Palacios,    We are writing to inform you of your test results.    The polyp tissue removed from your colon showed no evidence of cancer, but was identified as adenomatous.  These types of polyps can progress to cancer if left in the colon.    Although your polyp tissue was completely removed, we know that you may develop more polyps in the future.  The results and recommendations regarding a follow-up colonoscopy were sent to your primary physician.  He or she will review our recommendation in the context of your other medical problems.  Please remember that our recommendation is only for individuals who have no symptoms.  If you experience a persistent change in bowel habits, or develop new abdominal pain or bleeding in your stools, please consult your primary care physician.    If you have questions, please feel free to make an appointment with your primary care physician or gastroenterology clinic.      Repeat colonoscopy in 3 years.      Resulted Orders   Surgical pathology exam   Result Value Ref Range    Copath Report       Patient Name: JOSETTE PALACIOS  MR#: [id number]  Specimen #: [id number]  Collected: 9/13/2018  Received: 9/13/2018  Reported: 9/14/2018 09:11  Ordering Phy(s): CHARLIE IZAGUIRRE    For improved result formatting, select 'View Enhanced Report Format' under   Linked Documents section.    SPECIMEN(S):  A: Ascending colon polyp  B: Transverse colon, 2 polyps  C: Transverse colon polyp    FINAL DIAGNOSIS:  A. ASCENDING COLON POLYP, POLYPECTOMY:  - Tubular adenoma  - Negative for high-grade dysplasia    B. TRANSVERSE COLON, 2 POLYPS, POLYPECTOMY:  - Two tubular adenomas  - Negative for high-grade dysplasia    C. TRANSVERSE COLON POLYP, POLYPECTOMY:  - Tubular adenoma  - Negative for high-grade dysplasia    I have personally reviewed all specimens and/or slides, including  "the   listed special stains, and used them  with my medical judgement to determine or confirm the final diagnosis.    Electronically signed out by:    Carlos Bustillo M.D., [facility]    CLINICAL HISTORY:    Al coholic cirrhosis with ascites  GROSS:  A: The specimen is received in formalin with proper patient   identification, labeled \"ascending colon polyp\".  The specimen consists of a 0.3 x 0.2 x 0.1 cm red-brown piece of soft   tissue.  The probable resection margin  is inked black, and the specimen is entirely submitted in cassette A1.    B: The specimen is received in formalin with proper patient   identification, labeled \"transverse colon polyps\".   The specimen consists of two red-brown polyps ranging in size from   0.6-1.1 cm in greatest dimension.  The  resection margin of each is inked black, and each piece is serially   sectioned and entirely submitted.    Summary of Sections:  B1 - smaller polyp  B2 - larger polyp    C: The specimen is received in formalin with proper patient   identification, labeled \"transverse colon polyp\".  The specimen consists of a 0.9 x 0.4 x 0.2 cm pink-tan polyp.  The   probable resection margin is inked black,  and the specimen is serially sectioned and  entirely submitted in cassette   C1. (Dictated by: Debbie Barnett  9/13/2018 11:44 AM)    MICROSCOPIC:    Microscopic exam was performed    CPT Codes:  A: 55740-WR9  B: 03965-BC3  C: 49276-ME4    TESTING LAB LOCATION:  [facility] [address] [place]   [address]  [phone]    COLLECTION SITE:  Client: [facility]  Location: [place] (B)               It was a pleasure to see you at your recent visit. Please let me know if you have any questions or concerns.     Clinic Staff - [phone] option 3     Sincerely,     Saige Villalta MD  [address] [facility], [id number]  [place]  [address].              "

## 2018-09-14 LAB — COPATH REPORT: NORMAL

## 2018-09-21 ENCOUNTER — TELEPHONE (OUTPATIENT)
Dept: GASTROENTEROLOGY | Facility: CLINIC | Age: 63
End: 2018-09-21

## 2018-09-21 NOTE — TELEPHONE ENCOUNTER
Returned call to Erma at [facility]. She was unavailable to talk, and there were no other nurses able to take the call. Provided direct call back number with [facility] .

## 2018-09-21 NOTE — PROGRESS NOTES
Connected with patient for f/u on Hep C treatment delivery/start status. Patient received their Epclusa medication and have started treatment. Patient will be on Hep C treatment for 12 weeks. Patient reports no recent changes in health, hospitalizations or recent changes in medications. Patient did not discuss with a pharmacist. Patient is not sure of exact date she started the medication, but she states it was about 1-2 weeks ago. Patient takes at bedtime and goes to bed. she has not noticed any side effects or changes to how she is feeling. She denies any missed doses. Medication is supplied to her through mail order pharmacy through her insurance company and the medication tablets are loose in a bottle. Medication list reviewed and no contraindications noted through UpToDate.  Reviewed the following Hep C POC and education with patient.     Hepatitis C Treatment  Treatment:  Epclusa x 12 weeks  Genotype: 2  Stage Fibrosis: F4  Previous Treatment Outcome: Naive    Please have labs drawn as close to the date indicated at the [facility] or [facility]    Start Date: 09/10/18 (Approximately)    Week 4  BMP and hepatic panel  Lab Due: 10/8/2018  Please have the labs drawn on 10/8/2018 or within 1 week after the date. You do not need to fast for these labs.     Week 12 - End of Treatment  HCV RNA Quant Lab Due: 12/3/2018   Please have this lab drawn on or within a week after this date 12/3/2018. You will need to repeat this lab 3 months after completing treatment to ensure you have cleared the virus. Please see date for the final lab below. You do not need to fast for this lab.     3 Months Post Treatment  HCV RNA Quant, hepatic panel, and BMP Lab Due: 3/3/2019  Please have this lab drawn on the specified date of 3/3/2018 or within a week after. This final lab will determine if you have cleared the Hepatitis C virus with Epclusa treatment. Without this final lab, we will be unable to determine if treatment was  successful. You do not need to fast for this lab.         Educational information to patient on Hep C treatment     Epclusa Therapy:   -You will be taking Epclusa, 1 tablet daily for 12 weeks with or without food   -Do NOT take heartburn/reflux medications while on Epclusa without discussing with us first   -If you start any new medications, please call to discuss drug interactions with me before starting   -If you miss a dose, and it has been less than 12 hours, you may take the dose. If it has been more, skip the dose and take your next dose as normal. Do not take 2 doses at the same time.    -Most common side effects are Headache, Fatigue, Insomnia, and Nausea.    -Replace all items that come into contact with blood one week after starting therapy (eg. Toothbrush, bladed razors, diabetic lancets, etc).    -Set a daily alarm on your phone to remind you to take the medication.        -Contact the [facility] Hepatology clinic and speak with clinical RN prior to starting any new prescribed or OTC medications.   -Take medications exactly as prescribed, do not change dose or stop taking without consulting your provider.   -Take Medication one time each day with or without food  -If you miss a dose of medication, then take it as soon as you remember on the same day. If not remembered on the same day, then skip the dose and take the next dose at the usual time. Do not take more than the recommended dose. Contact the clinic if you miss a dose.    Please contact the pharmacy 1-2 weeks prior to needing a medication refill.      Side Effects  The most common side effects of Hep C medication treatment can include:  -tiredness  -headache  Notify the clinic of any side effects that bother you or that do not go away.   Possible side effects have been discussed.   Patient has been instructed to clinic for rash, itching or unmanageable nausea.    How to store Hep C Treatment Medications  -Store Medication at room temperature below 86  degrees F  -Keep Medication in it's original container  -Do not use Medication if the seal is broken or missing      General information  It is not known if treatment will prevent you from infecting another person or reinfecting yourself with the hepatitis C virus during treatment. It is best that as soon as you start treatment to buy a new toothbrush, disposable razors (if you use a rotating shaver you do not need to buy a new one) and nail clippers. If you wear dentures, you should soak your dentures in 70-90% Isopropyl solution for 5 minutes one time within the first week of starting treatment. After dentures are done soaking, rinse your dentures off thoroughly with water. If you check your blood sugar at home, please dispose of the fingerstick needle after each use and DO NOT REUSE the insulin needles. These items should not be shared with anyone.        If you have any questions, please contact the main clinic at [phone] or your clinical RN at [phone]. We appreciate you choosing the [facility] clinic for your treatment. Patient agrees to Hep C treatment POC and verbalizes understanding. Patient will receive a copy of treatment plan in the mail, address verified with patient. Patient has no further questions or concerns. Hep C care team updated on patient status.      Nicolette Aaron RN Care Coordinator  [facility]  Hepatology Clinic/Specialty Program

## 2018-09-21 NOTE — TELEPHONE ENCOUNTER
Reached patient to further discuss the hep C medication treatment plan. She will be due for a set of labs approximately 10/8, when she should have finished the first bottle of Epclusa and started the second bottle. She states she is getting her next shipment on 9/25. She states that she does continue to drink alcohol, but plans to stop until this treatment is complete. Discussed the benefits of no alcohol, she states she has been able to go for periods of time without, and she hopes to be successful now. She has no formal plan for support during this time, but agrees to keep in touch with me during treatment for Hep C.

## 2018-09-21 NOTE — TELEPHONE ENCOUNTER
[facility] Call Center    Phone Message    May a detailed message be left on voicemail: yes    Reason for Call: Other: Erma, nurse at [facility], spoke with the pt this morning, and the pt reported being drunk. When Erma asked her about it, the pt said that no one told her not to drink. The pt said that she told Dr Villalta that she is an alcoholic. Erma told the pt to stay home while she was drinking, and wanted Dr Villalta to know. Please contact the pt to discuss. Erma can be reached at [phone] if needed. Thanks.     Action Taken: Message routed to:  [facility]

## 2018-10-03 DIAGNOSIS — Z79.899 ENCOUNTER FOR MONITORING SUBOXONE MAINTENANCE THERAPY: ICD-10-CM

## 2018-10-03 DIAGNOSIS — Z51.81 ENCOUNTER FOR MONITORING SUBOXONE MAINTENANCE THERAPY: ICD-10-CM

## 2018-10-03 RX ORDER — BUPRENORPHINE HYDROCHLORIDE AND NALOXONE HYDROCHLORIDE DIHYDRATE 2; .5 MG/1; MG/1
TABLET SUBLINGUAL
Qty: 60 TABLET | Refills: 1 | Status: CANCELLED | OUTPATIENT
Start: 2018-10-03

## 2018-10-03 NOTE — TELEPHONE ENCOUNTER
buprenorphine-naloxone (SUBOXONE) 2-0.5 MG SUBL sublingual tablet        Last Written Prescription Date:  8-31-18  Last Fill Quantity: 60,   # refills: 0  Last Office Visit: 9-5-18 with Dr. West  Future Office visit:       Routing refill request to provider for review/approval because:  Drug not on the [facility], [facility] or [facility] refill protocol or controlled substance

## 2018-10-04 NOTE — TELEPHONE ENCOUNTER
Writer notes discrepancy of 5 refills    Called pharmacy   Last picked up: 8/31/18  Refills: 3    Pharmacy will get medication ready for patient    Discussed refills with patient - encouraged her to call and speak with pharmacy staff due to if she calls in with rx numbers they won't connect or she will be advised she is out of refills    Closing encounter - no further actions needed at this time    Kayy Garcia RN

## 2018-10-04 NOTE — TELEPHONE ENCOUNTER
Pt called to check on status of medication refill, stated she is almost out of meds. She would like to be called when it is faxed to the pharmacy.

## 2018-10-15 DIAGNOSIS — F41.8 ANXIETY ASSOCIATED WITH DEPRESSION: ICD-10-CM

## 2018-10-16 RX ORDER — DULOXETIN HYDROCHLORIDE 20 MG/1
CAPSULE, DELAYED RELEASE ORAL
Qty: 180 CAPSULE | Refills: 0 | Status: SHIPPED | OUTPATIENT
Start: 2018-10-16 | End: 2019-05-08

## 2018-10-16 NOTE — TELEPHONE ENCOUNTER
"Requested Prescriptions   Pending Prescriptions Disp Refills     DULoxetine (CYMBALTA) 20 MG EC capsule [Pharmacy Med Name: DULOXETINE DR 20MG CAPSULES]    Last Written Prescription Date:  9-6-17  Last Fill Quantity: 180,  # refills: 2   Last office visit: 9/5/2018 with prescribing provider:  Ishaan West   Future Office Visit:     180 capsule 0     Sig: TAKE 1 CAPSULE BY MOUTH TWICE DAILY    Serotonin-Norepinephrine Reuptake Inhibitors  Passed    10/15/2018  3:50 PM       Passed - Blood pressure under 140/90 in past 12 months    BP Readings from Last 3 Encounters:   09/13/18 97/59   09/05/18 148/72   07/02/18 176/86                Passed - Recent (12 mo) or future (30 days) visit within the authorizing provider's specialty    Patient had office visit in the last 12 months or has a visit in the next 30 days with authorizing provider or within the authorizing provider's specialty.  See \"Patient Info\" tab in inbasket, or \"Choose Columns\" in Meds & Orders section of the refill encounter.           Passed - Patient is age 18 or older       Passed - No active pregnancy on record       Passed - No positive pregnancy test in past 12 months          "

## 2018-10-16 NOTE — TELEPHONE ENCOUNTER
Prescription approved per [facility] Refill Protocol.    Suki Andres RN  [facility]

## 2018-10-19 DIAGNOSIS — B18.2 CHRONIC HEPATITIS C WITHOUT HEPATIC COMA (H): ICD-10-CM

## 2018-10-19 LAB
ALBUMIN SERPL-MCNC: 3.6 G/DL (ref 3.4–5)
ALP SERPL-CCNC: 122 U/L (ref 40–150)
ALT SERPL W P-5'-P-CCNC: 24 U/L (ref 0–50)
ANION GAP SERPL CALCULATED.3IONS-SCNC: 6 MMOL/L (ref 3–14)
AST SERPL W P-5'-P-CCNC: 28 U/L (ref 0–45)
BILIRUB DIRECT SERPL-MCNC: 0.2 MG/DL (ref 0–0.2)
BILIRUB SERPL-MCNC: 0.5 MG/DL (ref 0.2–1.3)
BUN SERPL-MCNC: 26 MG/DL (ref 7–30)
CALCIUM SERPL-MCNC: 8.8 MG/DL (ref 8.5–10.1)
CHLORIDE SERPL-SCNC: 107 MMOL/L (ref 94–109)
CO2 SERPL-SCNC: 22 MMOL/L (ref 20–32)
CREAT SERPL-MCNC: 1.24 MG/DL (ref 0.52–1.04)
GFR SERPL CREATININE-BSD FRML MDRD: 44 ML/MIN/1.7M2
GLUCOSE SERPL-MCNC: 117 MG/DL (ref 70–99)
POTASSIUM SERPL-SCNC: 5.3 MMOL/L (ref 3.4–5.3)
PROT SERPL-MCNC: 9.2 G/DL (ref 6.8–8.8)
SODIUM SERPL-SCNC: 135 MMOL/L (ref 133–144)

## 2018-10-19 PROCEDURE — 36415 COLL VENOUS BLD VENIPUNCTURE: CPT | Performed by: INTERNAL MEDICINE

## 2018-10-19 PROCEDURE — 80076 HEPATIC FUNCTION PANEL: CPT | Performed by: INTERNAL MEDICINE

## 2018-10-19 PROCEDURE — 87522 HEPATITIS C REVRS TRNSCRPJ: CPT | Performed by: INTERNAL MEDICINE

## 2018-10-19 PROCEDURE — 80048 BASIC METABOLIC PNL TOTAL CA: CPT | Performed by: INTERNAL MEDICINE

## 2018-10-22 LAB
HCV RNA SERPL NAA+PROBE-ACNC: NORMAL [IU]/ML
HCV RNA SERPL NAA+PROBE-LOG IU: NORMAL LOG IU/ML

## 2018-10-30 ENCOUNTER — TELEPHONE (OUTPATIENT)
Dept: GASTROENTEROLOGY | Facility: CLINIC | Age: 63
End: 2018-10-30

## 2018-10-30 NOTE — TELEPHONE ENCOUNTER
Writer left a detailed message for pt stating that it is not necessary to have lab work done on 11/5 prior to appt with Dr. Villalta and to call if questions.

## 2018-11-05 ENCOUNTER — OFFICE VISIT (OUTPATIENT)
Dept: GASTROENTEROLOGY | Facility: CLINIC | Age: 63
End: 2018-11-05
Attending: INTERNAL MEDICINE
Payer: COMMERCIAL

## 2018-11-05 VITALS
HEIGHT: 66 IN | DIASTOLIC BLOOD PRESSURE: 95 MMHG | TEMPERATURE: 98.6 F | SYSTOLIC BLOOD PRESSURE: 171 MMHG | HEART RATE: 72 BPM | OXYGEN SATURATION: 97 % | BODY MASS INDEX: 20.67 KG/M2 | WEIGHT: 128.6 LBS

## 2018-11-05 DIAGNOSIS — Z23 ENCOUNTER FOR VACCINATION: Primary | ICD-10-CM

## 2018-11-05 DIAGNOSIS — Z23 NEED FOR PROPHYLACTIC VACCINATION AND INOCULATION AGAINST INFLUENZA: ICD-10-CM

## 2018-11-05 DIAGNOSIS — B18.2 CHRONIC HEPATITIS C WITHOUT HEPATIC COMA (H): ICD-10-CM

## 2018-11-05 LAB
ALBUMIN SERPL-MCNC: 3.3 G/DL (ref 3.4–5)
ALP SERPL-CCNC: 134 U/L (ref 40–150)
ALT SERPL W P-5'-P-CCNC: 48 U/L (ref 0–50)
ANION GAP SERPL CALCULATED.3IONS-SCNC: 8 MMOL/L (ref 3–14)
AST SERPL W P-5'-P-CCNC: 107 U/L (ref 0–45)
BILIRUB DIRECT SERPL-MCNC: 0.2 MG/DL (ref 0–0.2)
BILIRUB SERPL-MCNC: 0.5 MG/DL (ref 0.2–1.3)
BUN SERPL-MCNC: 28 MG/DL (ref 7–30)
CALCIUM SERPL-MCNC: 8.6 MG/DL (ref 8.5–10.1)
CHLORIDE SERPL-SCNC: 110 MMOL/L (ref 94–109)
CO2 SERPL-SCNC: 20 MMOL/L (ref 20–32)
CREAT SERPL-MCNC: 1.1 MG/DL (ref 0.52–1.04)
GFR SERPL CREATININE-BSD FRML MDRD: 50 ML/MIN/1.7M2
GLUCOSE SERPL-MCNC: 152 MG/DL (ref 70–99)
POTASSIUM SERPL-SCNC: 3.9 MMOL/L (ref 3.4–5.3)
PROT SERPL-MCNC: 8.7 G/DL (ref 6.8–8.8)
SODIUM SERPL-SCNC: 138 MMOL/L (ref 133–144)

## 2018-11-05 PROCEDURE — 90750 HZV VACC RECOMBINANT IM: CPT | Mod: ZF | Performed by: INTERNAL MEDICINE

## 2018-11-05 PROCEDURE — 90686 IIV4 VACC NO PRSV 0.5 ML IM: CPT | Mod: ZF | Performed by: INTERNAL MEDICINE

## 2018-11-05 PROCEDURE — G0463 HOSPITAL OUTPT CLINIC VISIT: HCPCS | Mod: 25,ZF

## 2018-11-05 PROCEDURE — 25000581 ZZH RX MED A9270 GY (STAT IND- M) 250: Mod: ZF | Performed by: INTERNAL MEDICINE

## 2018-11-05 PROCEDURE — 25000128 H RX IP 250 OP 636: Mod: ZF | Performed by: INTERNAL MEDICINE

## 2018-11-05 PROCEDURE — G0008 ADMIN INFLUENZA VIRUS VAC: HCPCS | Mod: ZF

## 2018-11-05 PROCEDURE — 90472 IMMUNIZATION ADMIN EACH ADD: CPT | Mod: ZF

## 2018-11-05 PROCEDURE — 36415 COLL VENOUS BLD VENIPUNCTURE: CPT | Performed by: INTERNAL MEDICINE

## 2018-11-05 RX ADMIN — ZOSTER VACCINE RECOMBINANT, ADJUVANTED 0.5 ML: KIT at 12:17

## 2018-11-05 RX ADMIN — INFLUENZA A VIRUS A/MICHIGAN/45/2015 X-275 (H1N1) ANTIGEN (FORMALDEHYDE INACTIVATED), INFLUENZA A VIRUS A/SINGAPORE/INFIMH-16-0019/2016 IVR-186 (H3N2) ANTIGEN (FORMALDEHYDE INACTIVATED), INFLUENZA B VIRUS B/PHUKET/3073/2013 ANTIGEN (FORMALDEHYDE INACTIVATED), AND INFLUENZA B VIRUS B/MARYLAND/15/2016 BX-69A ANTIGEN (FORMALDEHYDE INACTIVATED) 0.5 ML: 15; 15; 15; 15 INJECTION, SUSPENSION INTRAMUSCULAR at 12:16

## 2018-11-05 ASSESSMENT — PAIN SCALES - GENERAL: PAINLEVEL: NO PAIN (0)

## 2018-11-05 NOTE — PROGRESS NOTES
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      Her cirrhosis is currently compensated.  HCC screening: UTD. Due in 6 mo. Ordered  Variceal screening: UTD. Due .    HCV treatment: GT 2b currently on Epclusa for 12 weeks, has 1 month left. Tolerating without difficulty. Discussed HCV timeline    Again discussed her alcohol use and recommendations to stop.    Shingles and flu shots today.     RTC 6 months.      Saige Vlilalta MD  Hepatology/Liver Transplant  Medical Director, Liver Transplantation  [facility]  ===================================================================        SUBJECTIVE:  Josette is a 62-year-old woman with HCV and alcoholic cirrhosis.  I have not seen her in over 2 years. She is GT 2b and is on Epclusa and has another 28 days. It is going ok.  No side effects.No missed doses.      Venita is here with her .       She had a broken ankle and got an osteomyelitis, requiring an external fixator and antibiotics. She was last on antibiotics many months ago. Doing better.     Still drinking off and on. Her usual pattern is off for a couple months, then she drinks for a few weeks then off again. She is tearful when she talks about it. She does not see anyone for this.    Recent Labs   Lab Test  18   1031   PROTTOTAL  8.7   ALBUMIN  3.3*   BILITOTAL  0.5   ALKPHOS  134   AST  107*   ALT  48     HE: She had this in the past. None for a while now. No meds needed for over a year.    Ascites: She thinks things are pretty good. No paracentesis needed in a long time. She has not been on diuretics for some time.    HCC screening: US 2018  Variceal screenin18. Normal. Due in 3 years  Colonoscopy 18. adenomatous polyps. due       CURRENT MEDICATIONS:  reviewed    PHYSICAL EXAMINATION:    BP (!) 171/95  Pulse 72  Temp 98.6  F (37  C) (Oral)  Ht 1.676 m (5' 6\")  Wt 58.3 kg (128 lb 9.6 oz)  SpO2 97%  " BMI 20.76 kg/m2  GENERAL:  Thin, in no acute distress.   HEENT:  No icterus, no oral lesions.   LYMPH:  No supraclavicular or cervical lymphadenopathy.   CARDIOVASCULAR:  Regular rate and rhythm.   CHEST:  Lungs are clear.   ABDOMEN:  Bowel sounds are present.  Abdomen is soft, nontender, mildly distended  EXTREMITIES:  No edema.   SKIN:  No rash.  Multiple spider angiomata and palmar erythema.     NEUROLOGIC:  Speech is fluent and clear.  No asterixis or tremor.

## 2018-11-05 NOTE — NURSING NOTE
Chief Complaint   Patient presents with     RECHECK     Hep C   Pt roomed, vitals, meds, and allergies reviewed with pt. Pt ready for provider.  Jonathan Jimenes, CMA

## 2018-11-05 NOTE — LETTER
11/5/2018       RE: Josette Palacios  [address]  [facility] [address]     Dear Colleague,    Thank you for referring your patient, Josette Palacios, to the [facility]PAT[facility] at [facility]. Please see a copy of my visit note below.    ASSESSMENT AND PLAN:  A 63-year-old woman with alcoholic and HCV cirrhosis with ongoing alcohol use.      Her cirrhosis is currently compensated.  HCC screening: UTD. Due in 6 mo. Ordered  Variceal screening: UTD. Due 2021.    HCV treatment: GT 2b currently on Epclusa for 12 weeks, has 1 month left. Tolerating without difficulty. Discussed HCV timeline    Again discussed her alcohol use and recommendations to stop.    Shingles and flu shots today.     RTC 6 months.      Saige Villalta MD  Hepatology/Liver Transplant  Medical Director, Liver Transplantation  [facility]  ===================================================================        SUBJECTIVE:  Josette is a 62-year-old woman with HCV and alcoholic cirrhosis.  I have not seen her in over 2 years. She is GT 2b and is on Epclusa and has another 28 days. It is going ok.  No side effects.No missed doses.      Venita is here with her .       She had a broken ankle and got an osteomyelitis, requiring an external fixator and antibiotics. She was last on antibiotics many months ago. Doing better.     Still drinking off and on. Her usual pattern is off for a couple months, then she drinks for a few weeks then off again. She is tearful when she talks about it. She does not see anyone for this.    Recent Labs   Lab Test  11/05/18   1031   PROTTOTAL  8.7   ALBUMIN  3.3*   BILITOTAL  0.5   ALKPHOS  134   AST  107*   ALT  48     HE: She had this in the past. None for a while now. No meds needed for over a year.    Ascites: She thinks things are pretty good. No paracentesis needed in a long time. She has not been on diuretics for some time.    HCC screening: US  "2018  Variceal screenin18. Normal. Due in 3 years  Colonoscopy 18. adenomatous polyps. due       CURRENT MEDICATIONS:  reviewed    PHYSICAL EXAMINATION:    BP (!) 171/95  Pulse 72  Temp 98.6  F (37  C) (Oral)  Ht 1.676 m (5' 6\")  Wt 58.3 kg (128 lb 9.6 oz)  SpO2 97%  BMI 20.76 kg/m2  GENERAL:  Thin, in no acute distress.   HEENT:  No icterus, no oral lesions.   LYMPH:  No supraclavicular or cervical lymphadenopathy.   CARDIOVASCULAR:  Regular rate and rhythm.   CHEST:  Lungs are clear.   ABDOMEN:  Bowel sounds are present.  Abdomen is soft, nontender, mildly distended  EXTREMITIES:  No edema.   SKIN:  No rash.  Multiple spider angiomata and palmar erythema.     NEUROLOGIC:  Speech is fluent and clear.  No asterixis or tremor.       Again, thank you for allowing me to participate in the care of your patient.      Sincerely,    Saige Villalta MD      "

## 2018-11-05 NOTE — LETTER
2018      RE: Josette Palacios  [address]  [place] [facility] [address]       ASSESSMENT AND PLAN:  A 63-year-old woman with alcoholic and HCV cirrhosis with ongoing alcohol use.      Her cirrhosis is currently compensated.  HCC screening: UTD. Due in 6 mo. Ordered  Variceal screening: UTD. Due .    HCV treatment: GT 2b currently on Epclusa for 12 weeks, has 1 month left. Tolerating without difficulty. Discussed HCV timeline    Again discussed her alcohol use and recommendations to stop.    Shingles and flu shots today.     RTC 6 months.      Saige Villalta MD  Hepatology/Liver Transplant  Medical Director, Liver Transplantation  [facility]  ===================================================================        SUBJECTIVE:  Josette is a 62-year-old woman with HCV and alcoholic cirrhosis.  I have not seen her in over 2 years. She is GT 2b and is on Epclusa and has another 28 days. It is going ok.  No side effects.No missed doses.      Venita is here with her .       She had a broken ankle and got an osteomyelitis, requiring an external fixator and antibiotics. She was last on antibiotics many months ago. Doing better.     Still drinking off and on. Her usual pattern is off for a couple months, then she drinks for a few weeks then off again. She is tearful when she talks about it. She does not see anyone for this.    Recent Labs   Lab Test  18   1031   PROTTOTAL  8.7   ALBUMIN  3.3*   BILITOTAL  0.5   ALKPHOS  134   AST  107*   ALT  48     HE: She had this in the past. None for a while now. No meds needed for over a year.    Ascites: She thinks things are pretty good. No paracentesis needed in a long time. She has not been on diuretics for some time.    HCC screening: US 2018  Variceal screenin18. Normal. Due in 3 years  Colonoscopy 18. adenomatous polyps. due       CURRENT MEDICATIONS:  reviewed    PHYSICAL EXAMINATION:    BP (!) 171/95  Pulse 72   "Temp 98.6  F (37  C) (Oral)  Ht 1.676 m (5' 6\")  Wt 58.3 kg (128 lb 9.6 oz)  SpO2 97%  BMI 20.76 kg/m2  GENERAL:  Thin, in no acute distress.   HEENT:  No icterus, no oral lesions.   LYMPH:  No supraclavicular or cervical lymphadenopathy.   CARDIOVASCULAR:  Regular rate and rhythm.   CHEST:  Lungs are clear.   ABDOMEN:  Bowel sounds are present.  Abdomen is soft, nontender, mildly distended  EXTREMITIES:  No edema.   SKIN:  No rash.  Multiple spider angiomata and palmar erythema.     NEUROLOGIC:  Speech is fluent and clear.  No asterixis or tremor.       Saige Villalta MD      "

## 2018-11-05 NOTE — MR AVS SNAPSHOT
After Visit Summary   11/5/2018    Josette Palacios    MRN: [id number]           Patient Information     Date Of Birth          1955        Visit Information        Provider Department      11/5/2018 11:00 AM Saige Villalta MD [facility]        Today's Diagnoses     Encounter for vaccination    -  1    Need for prophylactic vaccination and inoculation against influenza           Follow-ups after your visit        Follow-up notes from your care team     Return in about 6 months (around 5/5/2019).      Your next 10 appointments already scheduled     May 10, 2019  9:30 AM CDT   Lab with  LAB   [facility] ([facility])    [address] [place] [id number] [phone]            May 10, 2019 10:00 AM CDT   US ABDOMEN LIMITED with US1   [facility] ([facility])    [address] [place] [id number] [phone]           How do I prepare for my exam? (Food and drink instructions) Adults: No eating, smoking, gum chewing or drinking for 8 hours before the exam. You may take medicine with a small sip of water.  Children: * Infants, breast-fed: may have breast milk up to 2 hours before exam. * Infants, formula: may have bottle until 4 hours before exam. * Children 1-5 years: No food or drink for 4 hours before exam. * Children 6 -12 years: No food or drink for 6 hours before exam. * Children over 12 years: No food or drink for 8 hours before exam.  * J Tube Fed: No need to stop feedings.  What should I wear: Wear comfortable clothes.  How long does the exam take: Most ultrasounds take 30 to 60 minutes.  What should I bring: Bring a list of your medicines, including vitamins, minerals and over-the-counter drugs. It is safest to leave personal items at home.  Do I need a :  No  is needed.  What do I need to tell my doctor: Tell your doctor about any  allergies you may have.  What should I do after the exam: No restrictions, You may resume normal activities.  What is this test: An ultrasound uses sound waves to make pictures of the body. Sound waves do not cause pain. The only discomfort may be the pressure of the wand against your skin or full bladder.  Who should I call with questions: If you have any questions, please call the Imaging Department where you will have your exam. Directions, parking instructions, and other information is available on our website, [email or web address].            May 10, 2019 11:00 AM CDT   (Arrive by 10:45 AM)   Return General Liver with Saige Villalta MD   [facility] ([facility])    [address]  [address]  [facility] [address] [phone]              Future tests that were ordered for you today     Open Future Orders        Priority Expected Expires Ordered    US Abdomen Limited* Routine 5/5/2019 11/5/2019 11/5/2018            Who to contact     If you have questions or need follow up information about today's clinic visit or your schedule please contact [facility] directly at [phone].  Normal or non-critical lab and imaging results will be communicated to you by MyChart, letter or phone within 4 business days after the clinic has received the results. If you do not hear from us within 7 days, please contact the clinic through [employer] or phone. If you have a critical or abnormal lab result, we will notify you by phone as soon as possible.  Submit refill requests through [employer] or call your pharmacy and they will forward the refill request to us. Please allow 3 business days for your refill to be completed.          Additional Information About Your Visit        [employer] Information     [employer] gives you secure access to your electronic health record. If you see a primary care provider, you can also send messages to your care team and make appointments.  "If you have questions, please call your primary care clinic.  If you do not have a primary care provider, please call [phone] and they will assist you.        Care EveryWhere ID     This is your Care EveryWhere ID. This could be used by other organizations to access your [facility] medical records  [id number]        Your Vitals Were     Pulse Temperature Height Pulse Oximetry BMI (Body Mass Index)       72 98.6  F (37  C) (Oral) 1.676 m (5' 6\") 97% 20.76 kg/m2        Blood Pressure from Last 3 Encounters:   11/05/18 (!) 171/95   09/13/18 97/59   09/05/18 148/72    Weight from Last 3 Encounters:   11/05/18 58.3 kg (128 lb 9.6 oz)   09/13/18 57.4 kg (126 lb 8 oz)   09/05/18 57.8 kg (127 lb 8 oz)                 Today's Medication Changes          These changes are accurate as of 11/5/18 12:17 PM.  If you have any questions, ask your nurse or doctor.               Stop taking these medicines if you haven't already. Please contact your care team if you have questions.     ACE/ARB/ARNI NOT PRESCRIBED (INTENTIONAL)   Stopped by:  Saige Villalta MD           lactulose encephalopathy 10 GM/15ML SOLUTION   Commonly known as:  CHRONULAC   Stopped by:  Saige Villalta MD                    Primary Care Provider Office Phone # Fax #    Ishaan Tony West [phone]       [address] [address]        Equal Access to Services     [facility] : Hadii joey arreguino Solucas, waaxda luqadaha, qaybta kaalmada mack melchor. So [facility] [phone].    ATENCIÓN: Si habla español, tiene a cho disposición servicios gratuitos de asistencia lingüística. Llame al [phone].    We comply with applicable federal civil rights laws and [place] laws. We do not discriminate on the basis of race, color, national origin, age, disability, sex, sexual orientation, or gender identity.            Thank you!     Thank you for " choosing [facility]  for your care. Our goal is always to provide you with excellent care. Hearing back from our patients is one way we can continue to improve our services. Please take a few minutes to complete the written survey that you may receive in the mail after your visit with us. Thank you!             Your Updated Medication List - Protect others around you: Learn how to safely use, store and throw away your medicines at www.disposemymeds.org.          This list is accurate as of 11/5/18 12:17 PM.  Always use your most recent med list.                   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    Hyperglycemia       buprenorphine-naloxone 2-0.5 MG Subl sublingual tablet    SUBOXONE    60 tablet    PLACE 1 TABLET UNDER TONGUE TWICE DAILY    Narcotic dependence (H)       DULoxetine 20 MG EC capsule    CYMBALTA    180 capsule    TAKE 1 CAPSULE BY MOUTH TWICE DAILY    Anxiety associated with depression       metoprolol succinate 25 MG 24 hr tablet    TOPROL-XL    90 tablet    TAKE 1 TABLET(25 MG) BY MOUTH AT BEDTIME    Hypertension, benign essential, goal below 140/90       nicotine polacrilex 4 MG lozenge    RA MINI NICOTINE    360 tablet    Place 1 lozenge (4 mg) inside cheek as needed for smoking cessation    Cigarette nicotine dependence without complication       order for DME     1 Device    Equipment being ordered: Home blood pressure cuff    Hypertension, benign essential, goal below 140/90       pramipexole 0.125 MG tablet    MIRAPEX    90 tablet    TAKE 1 TABLET(0.125 MG) BY MOUTH AT BEDTIME    Restless legs syndrome (RLS)        sertraline 50 MG tablet    ZOLOFT    135 tablet    TAKE 1 AND 1/2 TABLETS(75 MG) BY MOUTH DAILY    Anxiety associated with depression       sofosbuvir-velpatasvir 400-100 MG per tablet    EPCLUSA    28 tablet    Take 1 tablet by mouth daily    Chronic hepatitis C without hepatic coma (H)       traZODone 50 MG tablet    DESYREL    90 tablet    TAKE 1 TABLET(50 MG) BY MOUTH EVERY EVENING AS NEEDED FOR SLEEP    Insomnia, unspecified type       tretinoin 0.025 % cream    RETIN-A    60 g    Spread a pea size amount into affected area topically at bedtime.  Use sunscreen SPF>20.    Age-related facial wrinkles       varenicline 0.5 MG X 11 & 1 MG X 42 tablet    CHANTIX STARTING MONTH BYRON    53 tablet    Take 0.5 mg tab daily for 3 days, then 0.5 mg tab twice daily for 4 days, then 1 mg twice daily.    Tobacco abuse

## 2018-11-05 NOTE — NURSING NOTE
"first dose of Shingrix and influenza vaccination given per Dr. Villalta, pt's first name, last name and  verified prior to administration, injection given without complications or questions, see immunizations for details.    Injectable Influenza Immunization Documentation    1.  Has the patient received the information for the injectable influenza vaccine? YES     2. Is the patient 6 months of age or older? YES     3. Does the patient have any of the following contraindications?         Severe allergy to eggs? No     Severe allergic reaction to previous influenza vaccines? No   Severe allergy to latex? No       History of Guillain-[place] syndrome? No     Currently have a temperature greater than 100.4F? No        4.  Severely egg allergic patients should have flu vaccine eligibility assessed by an MD, RN, or pharmacist, and those who received flu vaccine should be observed for 15 min by an MD, RN, Pharmacist, Medical Technician, or member of clinic staff.\": YES    5. Latex-allergic patients should be given latex-free influenza vaccine Yes. Please reference the Vaccine latex table to determine if your clinic s product is latex-containing.       Vaccination given by Kati Castro [facility]  2018 12:16 PM                "

## 2018-11-06 LAB
HCV RNA SERPL NAA+PROBE-ACNC: NORMAL [IU]/ML
HCV RNA SERPL NAA+PROBE-LOG IU: NORMAL LOG IU/ML

## 2018-11-14 DIAGNOSIS — F41.8 ANXIETY ASSOCIATED WITH DEPRESSION: ICD-10-CM

## 2018-11-15 NOTE — TELEPHONE ENCOUNTER
Prescription approved per [facility].    Suki Andres RN  [facility]

## 2018-11-30 ENCOUNTER — TELEPHONE (OUTPATIENT)
Dept: FAMILY MEDICINE | Facility: CLINIC | Age: 63
End: 2018-11-30

## 2018-11-30 DIAGNOSIS — F11.20 NARCOTIC DEPENDENCE (H): ICD-10-CM

## 2018-11-30 RX ORDER — BUPRENORPHINE HYDROCHLORIDE AND NALOXONE HYDROCHLORIDE DIHYDRATE 2; .5 MG/1; MG/1
TABLET SUBLINGUAL
Qty: 60 TABLET | Refills: 0 | Status: SHIPPED | OUTPATIENT
Start: 2018-11-30 | End: 2018-12-28

## 2018-11-30 NOTE — TELEPHONE ENCOUNTER
Prior Authorization Retail Medication Request    Medication/Dose: buprenorphine-naloxone (SUBOXONE) 2-0.5 MG SUBL sublingual tablet  ICD code (if different than what is on RX):    Previously Tried and Failed:    Rationale:  Plan does not cover this medication    Insurance Name:  [phone]  Insurance ID:  [id number]      Pharmacy Information (if different than what is on RX)  [facility]  Phone:  [phone]  Fax: [phone]

## 2018-11-30 NOTE — TELEPHONE ENCOUNTER
Requested Prescriptions   Pending Prescriptions Disp Refills     buprenorphine-naloxone (SUBOXONE) 2-0.5 MG SUBL sublingual tablet 60 tablet 0     Sig: PLACE 1 TABLET UNDER TONGUE TWICE DAILY    There is no refill protocol information for this order        Problem List Complete:  No     PROVIDER TO CONSIDER COMPLETION OF PROBLEM LIST AND OVERVIEW/CONTROLLED SUBSTANCE AGREEMENT    Last Written Prescription Date:  8/31/18  Last Fill Quantity: 60,   # refills: 0    [place] [facility] [place] [facility] [place]: 9/5/18    Future Office visit:   Next 5 appointments (look out 90 days)     Dec 05, 2018 11:30 AM CST   Office Visit with Ishaan West MD   [facility] ([facility])    [address] [place] [address] [phone]                  Controlled substance agreement on file: No.     Processing:  may be called to pharmacy   checked in past 3 months?  No, route to RN [place]  reviewed 11/30/2018 10:14 AM  Last refill: 11/19/18  Disp: 60 (30 day supply)    Patient should not be out of medication - I called the patient to clarify on reason she is out - I reviewed prescriptions with her, and discussed if Dr. West were to sign for new rx and early fill - insurance may block this medication and option would be to fill out of pocket - she says she needs a PA as well - which may further prevent her from filling - going into the weekend may add additional time - paying out of pocket may be an option as a last resort    While on the phone she was able to find her bottle of medication and so OK to cancel refill at this time    Kayy Garcia RN

## 2018-11-30 NOTE — TELEPHONE ENCOUNTER
Routing to provider - Jenna - please review and advise as appropriate    Patient states she needs renewal of prior authorization for suboxone - OK to initiate please?    Thank you,  Kayy Garcia RN

## 2018-11-30 NOTE — TELEPHONE ENCOUNTER
Prior Authorization Not Needed per Insurance    Medication: buprenorphine-naloxone (SUBOXONE) 2-0.5 MG SUBL sublingual tablet  Insurance Company: [employer] - Phone [phone] Fax [phone]  Expected CoPay:      Pharmacy Filling the Rx: [facility] [address] [place] [address] [address] [facility] [address] [place] [address]  Pharmacy Notified: Yes  Patient Notified: No

## 2018-11-30 NOTE — TELEPHONE ENCOUNTER
The patient called again asking for a prior authorization for the medication since she is completely out.

## 2018-12-03 NOTE — TELEPHONE ENCOUNTER
Central Prior Authorization Team   Phone: [phone]    Medication does not require a PA.  Called and verified with patient's insurance.  Patient last filled medication on 11/19/2018.  Medication will be too soon until 12/13/2018.

## 2018-12-07 ENCOUNTER — TELEPHONE (OUTPATIENT)
Dept: FAMILY MEDICINE | Facility: CLINIC | Age: 63
End: 2018-12-07

## 2018-12-07 NOTE — TELEPHONE ENCOUNTER
Prior Authorization Retail Medication Request    Medication/Dose: buprenorphine-naloxone (SUBOXONE) 2-0.5 MG SUBL sublingual tablet  ICD code (if different than what is on RX):    Previously Tried and Failed:    Rationale:      Insurance Name:     Insurance ID:  [id number]  Insurance ph. #: [phone]         Pharmacy Information (if different than what is on RX)  Name:  [facility]   Phone:  [phone]  Fax: [phone]

## 2018-12-07 NOTE — TELEPHONE ENCOUNTER
Central Prior Authorization Team   Phone: [phone]      PA NOT NEEDED, REFILL TOO SOON    Medication: buprenorphine-naloxone (SUBOXONE) 2-0.5 MG SUBL sublingual tablet-PA NOT NEEDED, REFILL TOO SOON  Insurance Company:    Pharmacy Filling the Rx:    Filling Pharmacy Phone:    Filling Pharmacy Fax:    Start Date: 12/7/2018    Medication does not require a PA.  Called and verified with patient's insurance.  Patient last filled medication on 11/19/2018.  Medication will be too soon until 12/13/2018.  Pharmacy has been notified and will let the patient know.

## 2018-12-10 ENCOUNTER — TELEPHONE (OUTPATIENT)
Dept: FAMILY MEDICINE | Facility: CLINIC | Age: 63
End: 2018-12-10

## 2018-12-10 NOTE — TELEPHONE ENCOUNTER
Dr. West,     [facility] writer. Erma reports that the patient she spoke with the patient regarding medications. Erma reports that the patient stated she was feeling depressed and unhappy.    Erma reports that the patient states she talks to nurses in office all the time and nothing ever changes. Patient states we are not doing anything about her concerns. Patient states her medication is not working and we are not listening to her and fixing things for her.     Ertha states it seems like she might be under the influence.     Writer called patient. Writer assessed patient for depression. Patient states yes she is depressed, but Dr. West already know that. Patient states it is no different than her normal. Patient denies suicidal ideation    Writer asked patient if she is satisfied with her care. Patient states we are doing everything we should be doing and that she loves Dr. West.     Patient's mood fluctuated throughout phone conversation. Patient would go from tearful to laughing. Slurred speech noted, writer thinks patient could potentially be under the influence.    Please advise    Thank You!  Disha Wong, RN  Triage Nurse

## 2018-12-10 NOTE — TELEPHONE ENCOUNTER
Dr. West/Provider Pool,    Patient states she burned her hand two days ago. She was putting pot roast in pan and the grease splashed up and hit her hand.    3x3 inches on her thumb. Patient is using aloe vera but requesting some sort of additional cream    Pharmacy cued    Please advise    Thank You!  Disha Wong, RALIEGH  Triage Nurse

## 2018-12-10 NOTE — TELEPHONE ENCOUNTER
Venita burned her hand and was wondering if some ointment could be called into the pharmacy for her. I have cued up the pharmacy. She can be reached at [phone].

## 2018-12-10 NOTE — PROGRESS NOTES
Patient states she completed her Epclusa medication a few days ago. She denies missed doses nor issues with side effects throughout treatment. Discussed negative HCV RNA level mid treatment, and plan to check again 3 months post treatment to confirm cure. Patient verbalized understanding, requests a reminder at that time, and has no further questions at this time.

## 2018-12-11 NOTE — TELEPHONE ENCOUNTER
Called patient. Relayed provider message below. Patient verbalized understanding.     Disha Wong, RN  Triage Nurse      Per routing comment from Dr. Gibbs  We no longer use prescription creams for burns    Vaseline is now the recommended suave/ ointment (Routing comment)

## 2018-12-12 NOTE — TELEPHONE ENCOUNTER
Per chart review pt has appt for 12/19/18. Closing encounter no further action needed.    Suki Andres RN  [facility]

## 2018-12-28 DIAGNOSIS — F11.20 NARCOTIC DEPENDENCE (H): ICD-10-CM

## 2018-12-28 RX ORDER — BUPRENORPHINE HYDROCHLORIDE AND NALOXONE HYDROCHLORIDE DIHYDRATE 2; .5 MG/1; MG/1
TABLET SUBLINGUAL
Qty: 60 TABLET | Refills: 0 | Status: SHIPPED | OUTPATIENT
Start: 2018-12-28 | End: 2019-02-20

## 2018-12-28 NOTE — TELEPHONE ENCOUNTER
Controlled Substance Refill Request for buprenorphine-naloxone (SUBOXONE) 2-0.5 MG SUBL sublingual tablet  Problem List Complete:  No     PROVIDER TO CONSIDER COMPLETION OF PROBLEM LIST AND OVERVIEW/CONTROLLED SUBSTANCE AGREEMENT    Last Written Prescription Date:  11/30/18  Last Fill Quantity: 60,   # refills: 0    Last Office Visit with [facility] primary care provider: 12/5/18    Future Office visit:     Controlled substance agreement on file: No.     Processing:    checked in past 3 months?

## 2018-12-28 NOTE — TELEPHONE ENCOUNTER
Routing refill request to provider for review/approval because:  Drug not on the [facility] refill protocol      check last fill 11/19/18 for 60, Dr. Jenna Live, RN   [facility]

## 2018-12-28 NOTE — TELEPHONE ENCOUNTER
Will need to schedule followup appointment; then approve Rx x1. Order signed, please notify, thanks Ishaan

## 2018-12-28 NOTE — TELEPHONE ENCOUNTER
Spoke with pt, she will call and get her appointment scheduled, she understands no further refills until seen    Shelli Live RN   [facility]

## 2019-01-10 ENCOUNTER — TELEPHONE (OUTPATIENT)
Dept: FAMILY MEDICINE | Facility: CLINIC | Age: 64
End: 2019-01-10

## 2019-01-10 NOTE — TELEPHONE ENCOUNTER
Panel Management Review      Patient has the following on her problem list:     Diabetes    ASA: Not Required     Last A1C  Lab Results   Component Value Date    A1C 5.9 04/27/2018    A1C 5.5 11/22/2017    A1C 5.7 02/15/2017    A1C 4.9 08/05/2016    A1C 6.4 06/03/2016     A1C tested: FAILED    Last LDL:    Lab Results   Component Value Date    CHOL 144 11/22/2017     Lab Results   Component Value Date     11/22/2017     Lab Results   Component Value Date    LDL 31 11/22/2017     Lab Results   Component Value Date    TRIG 67 11/22/2017     Lab Results   Component Value Date    CHOLHDLRATIO 1.3 04/15/2015     Lab Results   Component Value Date    NHDL 44 11/22/2017       Is the patient on a Statin? NO             Is the patient on Aspirin? NO        Last three blood pressure readings:  BP Readings from Last 3 Encounters:   11/05/18 (!) 171/95   09/13/18 97/59   09/05/18 148/72       Date of last diabetes office visit: 09/05/2018     Tobacco History:     History   Smoking Status     Current Every Day Smoker     Packs/day: 0.50     Years: 25.00     Types: Cigarettes   Smokeless Tobacco     Never Used           Composite cancer screening  Chart review shows that this patient is due/due soon for the following Pap Smear and Mammogram  Summary:    Patient is due/failing the following:   A1C, MAMMOGRAM and PAP    Action needed:   Patient needs office visit for diabetes, mammogram, and pap smear.    Type of outreach:    Sent letter.    Questions for provider review:    None                                                                                                                                    Jalen Hopkins MA     Chart routed to none.

## 2019-01-10 NOTE — LETTER
January 10, 2019      Josette Palacios  [address]  [facility] [address]        Dear Josette,    In order to ensure we are providing the best quality care, we have reviewed your chart and see that you are due for:    1. Diabetes follow up  2. Mammogram   3. Pap smear    Please call the clinic at your earliest convenience to schedule an appointment.  If you have completed these please contact our office via phone or TrackingPointhart to update our records.  We would like to know the date (approximately month and year), the result, and ideally where the procedure was performed.    Thank you for trusting us with your health care.      Sincerely,    Care Team for Ishaan West MD

## 2019-02-19 DIAGNOSIS — F11.20 NARCOTIC DEPENDENCE (H): ICD-10-CM

## 2019-02-19 NOTE — TELEPHONE ENCOUNTER
Dr. West;  Please review/sign or advise for refill request of:  buprenorphine-naloxone (SUBOXONE) 2-0.5 MG SUBL sublingual tablet    LOV: 09/05/2018 -- has appointment scheduled 02/27/2019    :  - Last dispensed: 12/28/2018 #60/30 day supply prescribed by Dr. West.    Thank You!  Disha Wong, RN  Triage Nurse

## 2019-02-19 NOTE — TELEPHONE ENCOUNTER
Controlled Substance Refill Request for buprenorphine-naloxone (SUBOXONE) 2-0.5 MG SUBL sublingual tablet  Problem List Complete:  No     PROVIDER TO CONSIDER COMPLETION OF PROBLEM LIST AND OVERVIEW/CONTROLLED SUBSTANCE AGREEMENT    Last Written Prescription Date:  12/28/2018  Last Fill Quantity: 60,   # refills: 0    THE MOST RECENT OFFICE VISIT MUST BE WITHIN THE PAST 3 MONTHS. AT LEAST ONE FACE TO FACE VISIT MUST OCCUR EVERY 6 MONTHS. ADDITIONAL VISITS CAN BE VIRTUAL.  (THIS STATEMENT SHOULD BE DELETED.)    Last Office Visit with [facility] primary care provider: 12/13/2018    Future Office visit:   Next 5 appointments (look out 90 days)    Feb 27, 2019  1:30 PM CST  Office Visit with Ishaan West MD  [facility] ([facility]) [address] [place] [address] [phone]          Controlled substance agreement:   Encounter-Level CSA:    There are no encounter-level csa.     Patient-Level CSA:    There are no patient-level csa.         Last Urine Drug Screen: No results found for: CDAUT, No results found for: COMDAT, No results found for: THC13, PCP13, COC13, MAMP13, OPI13, AMP13, BZO13, TCA13, MTD13, BAR13, OXY13, PPX13, BUP13     Processing:  [facility]     [email or web address]       checked in past 3 months?  No, route to RN

## 2019-02-20 RX ORDER — BUPRENORPHINE HYDROCHLORIDE AND NALOXONE HYDROCHLORIDE DIHYDRATE 2; .5 MG/1; MG/1
TABLET SUBLINGUAL
Qty: 60 TABLET | Refills: 0 | Status: SHIPPED | OUTPATIENT
Start: 2019-02-20 | End: 2019-05-08

## 2019-02-20 NOTE — TELEPHONE ENCOUNTER
Prescription for  buprenorphine-naloxone (SUBOXONE) 2-0.5 MG SUBL sublingual tablet  Faxed to [facility] at [phone]

## 2019-03-05 NOTE — PROGRESS NOTES
Left message reminding patient she is due for lab work 3 months post hep C treatment and can go to any [facility] lab to have this done. Clinic number provided if patient has questions.

## 2019-03-08 ENCOUNTER — DOCUMENTATION ONLY (OUTPATIENT)
Dept: CARE COORDINATION | Facility: CLINIC | Age: 64
End: 2019-03-08

## 2019-03-17 DIAGNOSIS — G47.00 INSOMNIA, UNSPECIFIED TYPE: ICD-10-CM

## 2019-03-18 RX ORDER — TRAZODONE HYDROCHLORIDE 50 MG/1
TABLET, FILM COATED ORAL
Qty: 90 TABLET | Refills: 0 | Status: SHIPPED | OUTPATIENT
Start: 2019-03-18 | End: 2019-11-18

## 2019-03-18 NOTE — TELEPHONE ENCOUNTER
"Requested Prescriptions   Pending Prescriptions Disp Refills     traZODone (DESYREL) 50 MG tablet [Pharmacy Med Name: TRAZODONE 50MG TABLETS] 90 tablet 0    Last Written Prescription Date:  03/16/2018  Last Fill Quantity: 90,  # refills: 2   Last office visit: 9/5/2018 with prescribing provider:  09/05/2018   Future Office Visit:   Sig: TAKE 1 TABLET(50 MG) BY MOUTH EVERY EVENING AS NEEDED FOR SLEEP    Serotonin Modulators Passed - 3/17/2019 11:09 AM       Passed - Recent (12 mo) or future (30 days) visit within the authorizing provider's specialty    Patient had office visit in the last 12 months or has a visit in the next 30 days with authorizing provider or within the authorizing provider's specialty.  See \"Patient Info\" tab in inbasket, or \"Choose Columns\" in Meds & Orders section of the refill encounter.             Passed - Medication is active on med list       Passed - Patient is age 18 or older       Passed - No active pregnancy on record       Passed - No positive pregnancy test in past 12 months        "

## 2019-03-18 NOTE — TELEPHONE ENCOUNTER
Prescription approved per [facility] Refill Protocol.  LOV: 09/05/2018    Disha Wong, RN  Triage Nurse

## 2019-04-02 DIAGNOSIS — B18.2 CHRONIC HEPATITIS C WITHOUT HEPATIC COMA (H): ICD-10-CM

## 2019-04-02 NOTE — TELEPHONE ENCOUNTER
"Requested Prescriptions   Pending Prescriptions Disp Refills     valACYclovir (VALTREX) 500 MG tablet [Pharmacy Med Name: VALACYCLOVIR 500MG TABLETS] 20 tablet 0    Last Written Prescription Date:  04/05/2018  Last Fill Quantity: 20,  # refills: 0   Last office visit: 9/5/2018 with prescribing provider:  12/05/2018   Future Office Visit:   Next 5 appointments (look out 90 days)    Apr 03, 2019 11:00 AM CDT  Nurse Only with [facility]  [facility] [id number] [place] [address] [id number] [phone]        Sig: TAKE ONE TABLET BY MOUTH TWICE DAILY AS NEEDED    Antivirals for Herpes Protocol Failed - 4/2/2019  3:05 PM       Failed - Medication is active on med list       Failed - Normal serum creatinine on file in past 12 months    Recent Labs   Lab Test 11/05/18  1031  03/10/14  1148  06/15/12  1045   CR 1.10*   < >  --    < >  --    CREAT  --   --   --   --  0.6   CRPOC  --   --  0.4*  --   --     < > = values in this interval not displayed.            Passed - Patient is age 12 or older       Passed - Recent (12 mo) or future (30 days) visit within the authorizing provider's specialty    Patient had office visit in the last 12 months or has a visit in the next 30 days with authorizing provider or within the authorizing provider's specialty.  See \"Patient Info\" tab in inbasket, or \"Choose Columns\" in Meds & Orders section of the refill encounter.              "

## 2019-04-02 NOTE — TELEPHONE ENCOUNTER
Routing refill request to provider for review/approval because:  Labs out of range:  CR  Pt was a now show on 12/5/18    Shelli Live RN   [facility]

## 2019-04-03 DIAGNOSIS — B18.2 CHRONIC HEPATITIS C WITHOUT HEPATIC COMA (H): ICD-10-CM

## 2019-04-03 LAB
ALBUMIN SERPL-MCNC: 3.7 G/DL (ref 3.4–5)
ALP SERPL-CCNC: 125 U/L (ref 40–150)
ALT SERPL W P-5'-P-CCNC: 44 U/L (ref 0–50)
ANION GAP SERPL CALCULATED.3IONS-SCNC: 5 MMOL/L (ref 3–14)
AST SERPL W P-5'-P-CCNC: 39 U/L (ref 0–45)
BILIRUB DIRECT SERPL-MCNC: 0.2 MG/DL (ref 0–0.2)
BILIRUB SERPL-MCNC: 0.5 MG/DL (ref 0.2–1.3)
BUN SERPL-MCNC: 32 MG/DL (ref 7–30)
CALCIUM SERPL-MCNC: 9.4 MG/DL (ref 8.5–10.1)
CHLORIDE SERPL-SCNC: 111 MMOL/L (ref 94–109)
CO2 SERPL-SCNC: 22 MMOL/L (ref 20–32)
CREAT SERPL-MCNC: 1.15 MG/DL (ref 0.52–1.04)
GFR SERPL CREATININE-BSD FRML MDRD: 50 ML/MIN/{1.73_M2}
GLUCOSE SERPL-MCNC: 192 MG/DL (ref 70–99)
POTASSIUM SERPL-SCNC: 4.7 MMOL/L (ref 3.4–5.3)
PROT SERPL-MCNC: 8.8 G/DL (ref 6.8–8.8)
SODIUM SERPL-SCNC: 138 MMOL/L (ref 133–144)

## 2019-04-03 PROCEDURE — 36415 COLL VENOUS BLD VENIPUNCTURE: CPT | Performed by: INTERNAL MEDICINE

## 2019-04-03 PROCEDURE — 80048 BASIC METABOLIC PNL TOTAL CA: CPT | Performed by: INTERNAL MEDICINE

## 2019-04-03 PROCEDURE — 87522 HEPATITIS C REVRS TRNSCRPJ: CPT | Performed by: INTERNAL MEDICINE

## 2019-04-03 PROCEDURE — 80076 HEPATIC FUNCTION PANEL: CPT | Performed by: INTERNAL MEDICINE

## 2019-04-03 RX ORDER — VALACYCLOVIR HYDROCHLORIDE 500 MG/1
TABLET, FILM COATED ORAL
Qty: 20 TABLET | Refills: 0 | OUTPATIENT
Start: 2019-04-03

## 2019-04-04 LAB
HCV RNA SERPL NAA+PROBE-ACNC: NORMAL [IU]/ML
HCV RNA SERPL NAA+PROBE-LOG IU: NORMAL LOG IU/ML

## 2019-04-22 ENCOUNTER — TELEPHONE (OUTPATIENT)
Dept: FAMILY MEDICINE | Facility: CLINIC | Age: 64
End: 2019-04-22

## 2019-04-22 NOTE — TELEPHONE ENCOUNTER
Panel Management Review      Patient has the following on her problem list:     Diabetes    ASA: Passed    Last A1C  Lab Results   Component Value Date    A1C 5.9 04/27/2018    A1C 5.5 11/22/2017    A1C 5.7 02/15/2017    A1C 4.9 08/05/2016    A1C 6.4 06/03/2016     A1C tested: out of date    Last LDL:    Lab Results   Component Value Date    CHOL 144 11/22/2017     Lab Results   Component Value Date     11/22/2017     Lab Results   Component Value Date    LDL 31 11/22/2017     Lab Results   Component Value Date    TRIG 67 11/22/2017     Lab Results   Component Value Date    CHOLHDLRATIO 1.3 04/15/2015     Lab Results   Component Value Date    NHDL 44 11/22/2017       Is the patient on a Statin? YES             Is the patient on Aspirin? YES        Last three blood pressure readings:  BP Readings from Last 3 Encounters:   11/05/18 (!) 171/95   09/13/18 97/59   09/05/18 148/72       Date of last diabetes office visit: N/A     Tobacco History:     History   Smoking Status     Current Every Day Smoker     Packs/day: 0.50     Years: 25.00     Types: Cigarettes   Smokeless Tobacco     Never Used           Composite cancer screening  Chart review shows that this patient is due/due soon for the following Pap Smear, Mammogram and Diabetic Eye Exam  Summary:    Patient is due/failing the following:   Diabetic Eye exam, A1C, MAMMOGRAM and PAP    Action needed:   Patient needs office visit for eye exam, A1c, pap, mamm.    Type of outreach:    Sent Trackway message.    Questions for provider review:    None                                                                                                                                    Maricruz Parrish    Care Management Coordinator - [facility]

## 2019-05-02 DIAGNOSIS — K70.31 ALCOHOLIC CIRRHOSIS OF LIVER WITH ASCITES (H): Primary | ICD-10-CM

## 2019-05-08 ENCOUNTER — OFFICE VISIT (OUTPATIENT)
Dept: FAMILY MEDICINE | Facility: CLINIC | Age: 64
End: 2019-05-08
Payer: COMMERCIAL

## 2019-05-08 VITALS
TEMPERATURE: 98 F | HEIGHT: 66 IN | BODY MASS INDEX: 20.75 KG/M2 | HEART RATE: 69 BPM | SYSTOLIC BLOOD PRESSURE: 136 MMHG | OXYGEN SATURATION: 100 % | WEIGHT: 129.1 LBS | DIASTOLIC BLOOD PRESSURE: 60 MMHG

## 2019-05-08 DIAGNOSIS — Z51.81 ENCOUNTER FOR MONITORING SUBOXONE MAINTENANCE THERAPY: Primary | ICD-10-CM

## 2019-05-08 DIAGNOSIS — Z79.899 ENCOUNTER FOR MONITORING SUBOXONE MAINTENANCE THERAPY: Primary | ICD-10-CM

## 2019-05-08 DIAGNOSIS — F10.20 ALCOHOL DEPENDENCE, EPISODIC (H): ICD-10-CM

## 2019-05-08 DIAGNOSIS — M25.552 HIP PAIN, LEFT: ICD-10-CM

## 2019-05-08 PROBLEM — Z86.19 HISTORY OF HERPES GENITALIS: Status: ACTIVE | Noted: 2019-05-08

## 2019-05-08 PROCEDURE — 99214 OFFICE O/P EST MOD 30 MIN: CPT | Performed by: FAMILY MEDICINE

## 2019-05-08 RX ORDER — BUPRENORPHINE HYDROCHLORIDE AND NALOXONE HYDROCHLORIDE DIHYDRATE 2; .5 MG/1; MG/1
TABLET SUBLINGUAL
Qty: 60 TABLET | Refills: 0 | Status: SHIPPED | OUTPATIENT
Start: 2019-05-08 | End: 2019-07-03

## 2019-05-08 RX ORDER — DULOXETIN HYDROCHLORIDE 20 MG/1
20 CAPSULE, DELAYED RELEASE ORAL DAILY
Qty: 90 CAPSULE | Refills: 3 | COMMUNITY
Start: 2019-05-08 | End: 2019-07-12

## 2019-05-08 RX ORDER — VALACYCLOVIR HYDROCHLORIDE 500 MG/1
TABLET, FILM COATED ORAL
Qty: 20 TABLET | Refills: 3 | Status: SHIPPED | OUTPATIENT
Start: 2019-05-08

## 2019-05-08 ASSESSMENT — MIFFLIN-ST. JEOR: SCORE: 1152.34

## 2019-05-08 NOTE — PROGRESS NOTES
"  SUBJECTIVE:   Josette Palacios is a 64 year old female who presents to clinic today for the following   health issues:      Medication Followup of Suboxone     Taking Medication as prescribed: yes    Side Effects:  None    Medication Helping Symptoms:  yes     Patient varies the amount of Suboxone that she takes. Some days she will take a full tab, other days a half tab.    Alcohol  Patient has had occasional relapses from her alcohol sobriety. She had a drink yesterday. Patient states that she can go \"weeks\" without a drink, doesn't feel like alcohol is something she has to have. Patient attends AA meetings \"once in a while\", has a sponsor that she spoke to approximately one month ago.      Additional history: as documented    Reviewed  and updated as needed this visit by clinical staff         Reviewed and updated as needed this visit by Provider         Patient Active Problem List   Diagnosis     Perimenopausal     Nicotine dependence     Anxiety associated with depression     Pain in joint, lower leg     Ascites     Hypertension, benign essential, goal below 140/90     Type 2 diabetes mellitus without complication (H)     Alcohol dependence with other alcohol-induced disorder (H)     Closed fracture of shaft of left tibia with nonunion, unspecified fracture morphology, subsequent encounter     Low vitamin D level     Alcohol dependence, episodic (H)     Encounter for monitoring Suboxone maintenance therapy     Eczema, unspecified type     Chronic hepatitis C without hepatic coma (H)     Past Surgical History:   Procedure Laterality Date     breast implants       ESOPHAGOSCOPY, GASTROSCOPY, DUODENOSCOPY (EGD), COMBINED  1/24/2014    Procedure: COMBINED ESOPHAGOSCOPY, GASTROSCOPY, DUODENOSCOPY (EGD);;  Surgeon: Tony Moctezuma MD;  Location:  GI     ESOPHAGOSCOPY, GASTROSCOPY, DUODENOSCOPY (EGD), COMBINED N/A 9/13/2018    Procedure: COMBINED ESOPHAGOSCOPY, GASTROSCOPY, DUODENOSCOPY (EGD);  EGD/Colonoscopy ;  " Surgeon: Saige Villalta MD;  Location:  GI     ORTHOPEDIC SURGERY      TKR left       Social History     Tobacco Use     Smoking status: Current Every Day Smoker     Packs/day: 0.50     Years: 25.00     Pack years: 12.50     Types: Cigarettes     Smokeless tobacco: Never Used   Substance Use Topics     Alcohol use: No     Alcohol/week: 0.0 oz     Family History   Problem Relation Age of Onset     Cancer Father          Current Outpatient Medications   Medication Sig Dispense Refill     blood glucose monitoring (ACCU-CHEK CHASE) test strip Use to test blood sugar 3 times daily or as directed. 100 each 5     buprenorphine-naloxone (SUBOXONE) 2-0.5 MG SUBL sublingual tablet PLACE 1 TABLET UNDER TONGUE TWICE DAILY 60 tablet 0     DULoxetine (CYMBALTA) 20 MG EC capsule TAKE 1 CAPSULE BY MOUTH TWICE DAILY 180 capsule 0     metoprolol succinate (TOPROL-XL) 25 MG 24 hr tablet TAKE 1 TABLET(25 MG) BY MOUTH AT BEDTIME 90 tablet 3     pramipexole (MIRAPEX) 0.125 MG tablet TAKE 1 TABLET(0.125 MG) BY MOUTH AT BEDTIME 90 tablet 3     sertraline (ZOLOFT) 50 MG tablet TAKE 1 AND 1/2 TABLETS(75 MG) BY MOUTH DAILY 135 tablet 0     traZODone (DESYREL) 50 MG tablet TAKE 1 TABLET(50 MG) BY MOUTH EVERY EVENING AS NEEDED FOR SLEEP 90 tablet 0     amoxicillin (AMOXIL) 500 MG capsule TAKE FOUR CAPSULES BY MOUTH 1 HOUR BEFORE DENTAL APPOINTMENT (Patient not taking: Reported on 5/8/2019) 4 capsule 0     nicotine polacrilex (RA MINI NICOTINE) 4 MG lozenge Place 1 lozenge (4 mg) inside cheek as needed for smoking cessation (Patient not taking: Reported on 7/2/2018) 360 tablet 1     order for DME Equipment being ordered: Home blood pressure cuff 1 Device 0     sofosbuvir-velpatasvir (EPCLUSA) 400-100 MG per tablet Take 1 tablet by mouth daily 28 tablet 2     tretinoin (RETIN-A) 0.025 % cream Spread a pea size amount into affected area topically at bedtime.  Use sunscreen SPF>20. (Patient not taking: Reported on 5/8/2019) 60 g 3  "    varenicline (CHANTIX STARTING MONTH PAK) 0.5 MG X 11 & 1 MG X 42 tablet Take 0.5 mg tab daily for 3 days, then 0.5 mg tab twice daily for 4 days, then 1 mg twice daily. (Patient not taking: Reported on 7/2/2018) 53 tablet 0     No Known Allergies  Recent Labs   Lab Test 04/03/19  1117 11/05/18  1031 10/19/18  1437  04/27/18  1216  11/22/17  1600 02/15/17  1123  07/28/16  1146  04/15/15  1516   A1C  --   --   --   --  5.9*  --  5.5 5.7   < >  --    < > 5.7   LDL  --   --   --   --   --   --  31  --   --  41  --  28   HDL  --   --   --   --   --   --  100  --   --  36*  --  113   TRIG  --   --   --   --   --   --  67  --   --  86  --  57   ALT 44 48 24   < > 49   < > 36  --    < >  --    < > 84*   CR 1.15* 1.10* 1.24*   < >  --   --  0.98  --    < >  --    < > 1.39*   GFRESTIMATED 50* 50* 44*   < >  --   --  57*  --    < >  --    < > 39*   GFRESTBLACK 58* 61 53*   < >  --   --  69  --    < >  --    < > 47*   POTASSIUM 4.7 3.9 5.3   < >  --   --  4.2  --    < >  --    < > 3.7   TSH  --   --   --   --   --   --  8.04*  --   --   --   --  6.34*    < > = values in this interval not displayed.      BP Readings from Last 3 Encounters:   05/08/19 136/60   11/05/18 (!) 171/95   09/13/18 97/59    Wt Readings from Last 3 Encounters:   05/08/19 58.6 kg (129 lb 1.6 oz)   11/05/18 58.3 kg (128 lb 9.6 oz)   09/13/18 57.4 kg (126 lb 8 oz)                  Labs reviewed in EPIC    ROS:  Remainder of ROS is negative unless otherwise noted above or in HPI.    This document serves as a record of the services and decisions personally performed and made by Ishaan West MD. It was created on his behalf by Bandar Celis, trained medical scribe. The creation of this document is based on the provider's statements to the medical scribe.  Bandar Celis 12:06 PM May 8, 2019    OBJECTIVE:     /60   Pulse 69   Temp 98  F (36.7  C) (Temporal)   Ht 1.676 m (5' 6\")   Wt 58.6 kg (129 lb 1.6 oz)   SpO2 100%   BMI 20.84 kg/m    Body " mass index is 20.84 kg/m .  GENERAL: healthy, alert and no distress. Smell of alcohol on breath  RESP: lungs clear to auscultation - no rales, rhonchi or wheezes  CV: regular rate and rhythm, normal S1 S2, no S3 or S4, no murmur, click or rub, no peripheral edema and peripheral pulses strong  MS: no gross musculoskeletal defects noted, internal rotation of left hip is painful, some indurated area that is palm sized below the left greater trochanter   PSYCH: mentation appears normal, affect normal/bright    Diagnostic Test Results:  No results found for this or any previous visit (from the past 24 hour(s)).    ASSESSMENT/PLAN:   (Z51.81,  Z79.899) Encounter for monitoring Suboxone maintenance therapy  (primary encounter diagnosis)  Comment: Patient is doing well on her current dose.  Plan: Continue taking medication. Follow up as needed.    (M25.552) Hip pain, left  Comment: Improving.  Plan: Monitoring. Follow up as needed.    (F10.20) Alcohol dependence, episodic (H)  Comment: Patient has had relapses- at high risk for worsening hep C.  Plan: Advised patient to decrease and quit alcohol, continue with AA meetings and to have regular contact with her sponsor. Follow up as needed.    (Z86.19) History of herpes genitalis  Comment: Stable. Controlled by current medication.  Plan: valACYclovir (VALTREX) 500 MG tablet        Continue taking medication. Follow up as needed.      Patient Instructions   Refills given for your medications.      The information in this document, created by the medical scribe for me, accurately reflects the services I personally performed and the decisions made by me. I have reviewed and approved this document for accuracy prior to leaving the patient care area.  May 8, 2019 12:06 PM    Ishaan West MD  [facility]

## 2019-05-17 ENCOUNTER — TELEPHONE (OUTPATIENT)
Dept: FAMILY MEDICINE | Facility: CLINIC | Age: 64
End: 2019-05-17

## 2019-05-17 NOTE — TELEPHONE ENCOUNTER
Panel Management Review      Patient has the following on her problem list:     Diabetes    ASA: Not Required     Last A1C  Lab Results   Component Value Date    A1C 5.9 04/27/2018    A1C 5.5 11/22/2017    A1C 5.7 02/15/2017    A1C 4.9 08/05/2016    A1C 6.4 06/03/2016     A1C tested: Passed    Last LDL:    Lab Results   Component Value Date    CHOL 144 11/22/2017     Lab Results   Component Value Date     11/22/2017     Lab Results   Component Value Date    LDL 31 11/22/2017     Lab Results   Component Value Date    TRIG 67 11/22/2017     Lab Results   Component Value Date    CHOLHDLRATIO 1.3 04/15/2015     Lab Results   Component Value Date    NHDL 44 11/22/2017       Is the patient on a Statin? NO             Is the patient on Aspirin? NO        Last three blood pressure readings:  BP Readings from Last 3 Encounters:   05/08/19 136/60   11/05/18 (!) 171/95   09/13/18 97/59       Date of last diabetes office visit: 04/27/2018     Tobacco History:     History   Smoking Status     Current Every Day Smoker     Packs/day: 0.50     Years: 25.00     Types: Cigarettes   Smokeless Tobacco     Never Used         Hypertension   Last three blood pressure readings:  BP Readings from Last 3 Encounters:   05/08/19 136/60   11/05/18 (!) 171/95   09/13/18 97/59     Blood pressure: Passed    HTN Guidelines:  Less than 140/90      Composite cancer screening  Chart review shows that this patient is due/due soon for the following Mammogram  Summary:    Patient is due/failing the following:   A1C, DAP and PHQ9    Action needed:   Patient needs office visit for diabetes follow up, already has an appointment on 05/31 for hip pain, I did attach diabetes follow up to that visit per Dr. West.    Type of outreach:    Phone, left message for patient to call back.     Questions for provider review:    None                                                                                                                                     Adelina Hernadez CMA.       Chart routed to none.

## 2019-06-05 ENCOUNTER — TRANSFERRED RECORDS (OUTPATIENT)
Dept: HEALTH INFORMATION MANAGEMENT | Facility: CLINIC | Age: 64
End: 2019-06-05

## 2019-06-05 LAB
HBA1C MFR BLD: 6 % (ref 0–5.7)
TSH SERPL-ACNC: 0.47 UIU/ML (ref 0.36–3.74)

## 2019-06-06 ENCOUNTER — TELEPHONE (OUTPATIENT)
Dept: GASTROENTEROLOGY | Facility: CLINIC | Age: 64
End: 2019-06-06

## 2019-06-06 LAB — EJECTION FRACTION: 58

## 2019-06-06 NOTE — TELEPHONE ENCOUNTER
[facility] Call Center    Phone Message    May a detailed message be left on voicemail: yes    Reason for Call: Requesting Results   Name/type of test: Orders from Dr Saige Villalta for Hep C.  Date of test: 04/3/19  Was test done at a location other than [facility] (Please fill in the location if not [facility])?: Yes: [facility] is requesting a call back to the insurance company to confirm results of the Hep C as they are unable to make contact with the patient.      Action Taken: Message routed to:  [facility]ology

## 2019-06-07 NOTE — TELEPHONE ENCOUNTER
Returned call to number provided. The person from [facility] did not want to take the lab result, and did not provide an alternative number to call.

## 2019-06-12 LAB — INR PPP: 1.3 (ref 1–1.2)

## 2019-06-13 DIAGNOSIS — F41.8 ANXIETY ASSOCIATED WITH DEPRESSION: ICD-10-CM

## 2019-06-14 RX ORDER — DULOXETIN HYDROCHLORIDE 20 MG/1
CAPSULE, DELAYED RELEASE ORAL
Qty: 180 CAPSULE | Refills: 0 | OUTPATIENT
Start: 2019-06-14

## 2019-06-14 NOTE — TELEPHONE ENCOUNTER
Dr Jenna TUCKER  Pt is currently IP at [facility] for respiratory failure    Refill denied    Shelli Live, [place]   [facility]

## 2019-06-14 NOTE — TELEPHONE ENCOUNTER
"Requested Prescriptions   Pending Prescriptions Disp Refills     DULoxetine (CYMBALTA) 20 MG capsule [Pharmacy Med Name: DULOXETINE DR 20MG CAPSULES] 180 capsule 0     Sig: TAKE 1 CAPSULE BY MOUTH TWICE DAILY  Last Written Prescription Date:  05/08/2019  Last Fill Quantity: 90,  # refills: 3   Last office visit: 5/8/2019 with prescribing provider:  05/31/2019   Future Office Visit:         Serotonin-Norepinephrine Reuptake Inhibitors  Passed - 6/13/2019  5:13 PM        Passed - Blood pressure under 140/90 in past 12 months     BP Readings from Last 3 Encounters:   05/08/19 136/60   11/05/18 (!) 171/95   09/13/18 97/59                 Passed - Recent (12 mo) or future (30 days) visit within the authorizing provider's specialty     Patient had office visit in the last 12 months or has a visit in the next 30 days with authorizing provider or within the authorizing provider's specialty.  See \"Patient Info\" tab in inbasket, or \"Choose Columns\" in Meds & Orders section of the refill encounter.              Passed - Medication is active on med list        Passed - Patient is age 18 or older        Passed - No active pregnancy on record        Passed - No positive pregnancy test in past 12 months        sertraline (ZOLOFT) 50 MG tablet [Pharmacy Med Name: SERTRALINE 50MG TABLETS]  Last Written Prescription Date:  11/15/2018  Last Fill Quantity: 135,  # refills: 0   Last office visit: 5/8/2019 with prescribing provider:  05/31/2019   Future Office Visit:   135 tablet 0     Sig: TAKE 1 AND 1/2 TABLETS(75 MG) BY MOUTH DAILY       SSRIs Protocol Passed - 6/13/2019  5:13 PM        Passed - Recent (12 mo) or future (30 days) visit within the authorizing provider's specialty     Patient had office visit in the last 12 months or has a visit in the next 30 days with authorizing provider or within the authorizing provider's specialty.  See \"Patient Info\" tab in inbasket, or \"Choose Columns\" in Meds & Orders section of the refill " encounter.              Passed - Medication is active on med list        Passed - Patient is age 18 or older        Passed - No active pregnancy on record        Passed - No positive pregnancy test in last 12 months

## 2019-06-18 LAB — TRIGL SERPL-MCNC: 130 MG/DL

## 2019-06-19 LAB
ALT SERPL-CCNC: 24 IU/L (ref 12–68)
AST SERPL-CCNC: 13 IU/L (ref 12–37)

## 2019-06-21 LAB
CREAT SERPL-MCNC: 0.98 MG/DL (ref 0.55–1.02)
GFR SERPL CREATININE-BSD FRML MDRD: 57 ML/MIN
GLUCOSE SERPL-MCNC: 115 MG/DL (ref 74–106)
POTASSIUM SERPL-SCNC: 3.7 MMOL/L (ref 3.5–5.1)

## 2019-06-25 ENCOUNTER — TELEPHONE (OUTPATIENT)
Dept: FAMILY MEDICINE | Facility: CLINIC | Age: 64
End: 2019-06-25

## 2019-06-25 NOTE — TELEPHONE ENCOUNTER
Verbal ok for skilled homecare orders [facility]     Shelli Live RN   [facility]

## 2019-06-25 NOTE — TELEPHONE ENCOUNTER
Reason for Call:  Home Health Care    Saige with [facility] called regarding (reason for call): Orders    Orders are needed for this patient. All    PT: Eval and treat starting 6-26-19    OT: Eval and treat starting 6-26-19    Skilled Nursing: Eval and treat starting 6-26-19    Pt Provider: Dr. West    Phone Number Homecare Nurse can be reached at: [phone]    Can we leave a detailed message on this number? YES    Phone number patient can be reached at: Cell number on file:    Telephone Information:   Mobile [phone]       Best Time: Anytime    Call taken on 6/25/2019 at 9:20 AM by Vannesa Almaraz

## 2019-06-26 ENCOUNTER — TRANSFERRED RECORDS (OUTPATIENT)
Dept: HEALTH INFORMATION MANAGEMENT | Facility: CLINIC | Age: 64
End: 2019-06-26

## 2019-06-26 NOTE — TELEPHONE ENCOUNTER
[facility] Call Center    Phone Message    May a detailed message be left on voicemail: yes    Reason for Call: Other: Guillermotmanoj called back With a new ph# to call with the results. See message below. Please call [phone], open 8-8 M-F EST. Thanks    Action Taken: Message routed to:  [facility] uc hep

## 2019-06-27 ENCOUNTER — TELEPHONE (OUTPATIENT)
Dept: FAMILY MEDICINE | Facility: CLINIC | Age: 64
End: 2019-06-27

## 2019-06-27 NOTE — TELEPHONE ENCOUNTER
Panel Management Review      Patient has the following on her problem list:     Diabetes    ASA: Not Required     Last A1C  Lab Results   Component Value Date    A1C 6.0 06/05/2019    A1C 5.9 04/27/2018    A1C 5.5 11/22/2017    A1C 5.7 02/15/2017    A1C 4.9 08/05/2016     A1C tested: Passed    Last LDL:    Lab Results   Component Value Date    CHOL 144 11/22/2017     Lab Results   Component Value Date     11/22/2017     Lab Results   Component Value Date    LDL 31 11/22/2017     Lab Results   Component Value Date    TRIG 130 06/18/2019     Lab Results   Component Value Date    CHOLHDLRATIO 1.3 04/15/2015     Lab Results   Component Value Date    NHDL 44 11/22/2017       Is the patient on a Statin? NO             Is the patient on Aspirin? NO        Last three blood pressure readings:  BP Readings from Last 3 Encounters:   05/08/19 136/60   11/05/18 (!) 171/95   09/13/18 97/59       Date of last diabetes office visit: 4/27/18     Tobacco History:     History   Smoking Status     Current Every Day Smoker     Packs/day: 0.50     Years: 25.00     Types: Cigarettes   Smokeless Tobacco     Never Used           Composite cancer screening  Chart review shows that this patient is due/due soon for the following Pap Smear and Mammogram  Summary:    Patient is due/failing the following:   DIABETIC EYE EXAM, MAMMOGRAM and PAP    Action needed:   Patient needs office visit for Physical w/ pap. and Patient needs referral/order: Eye exam    Type of outreach:    None, routed to provider for review.    Questions for provider review:    Diabetes is not her PL.                                                                                                                                    Adelina Hernadez CMA.       Chart routed to Provider.

## 2019-06-28 NOTE — TELEPHONE ENCOUNTER
Patient currently convalescing in SNF for medical complications. Followup here post discharge. Ishaan West MD

## 2019-07-01 ENCOUNTER — TELEPHONE (OUTPATIENT)
Dept: FAMILY MEDICINE | Facility: CLINIC | Age: 64
End: 2019-07-01

## 2019-07-01 NOTE — TELEPHONE ENCOUNTER
Ok for skilled given VM left on PT secure vm    Shelli Live RN   [facility]

## 2019-07-01 NOTE — TELEPHONE ENCOUNTER
Reason for call:  Order   Order or referral being requested: PT at home  Reason for request: 2x/week for 3 weeks, 1x/week for 1 week  Date needed: as soon as possible  Has the patient been seen by the PCP for this problem? YES    Additional comments: Please call back with verbal OK.    Phone number to reach patient:  Other phone number:    [phone]    Best Time:  any    Can we leave a detailed message on this number?  YES

## 2019-07-02 ENCOUNTER — TELEPHONE (OUTPATIENT)
Dept: FAMILY MEDICINE | Facility: CLINIC | Age: 64
End: 2019-07-02

## 2019-07-02 NOTE — TELEPHONE ENCOUNTER
Returned call to Myla with home care. Verbal orders given per nursing protocol for skilled nursing once a week for 4 weeks, and then once for one month. Myla verbalized understanding    Disha Wong RN  Triage Nurse

## 2019-07-02 NOTE — TELEPHONE ENCOUNTER
Reason for call:  Order   Order or referral being requested: skilled nursing once a week for 4 weeks, and then one  For one month  Reason for request: raspatory assessment, diabetes assesment  Date needed: as soon as possible  Has the patient been seen by the PCP for this problem? Not Applicable    Additional comments: n/a    Phone number to reach patient:  Other phone number:  [phone]    Best Time:  n/a    Can we leave a detailed message on this number?  YES

## 2019-07-03 ENCOUNTER — TELEPHONE (OUTPATIENT)
Dept: FAMILY MEDICINE | Facility: CLINIC | Age: 64
End: 2019-07-03

## 2019-07-03 DIAGNOSIS — I10 HYPERTENSION, BENIGN ESSENTIAL, GOAL BELOW 140/90: ICD-10-CM

## 2019-07-03 DIAGNOSIS — F11.20 NARCOTIC DEPENDENCE (H): Primary | ICD-10-CM

## 2019-07-03 RX ORDER — BUPRENORPHINE HYDROCHLORIDE AND NALOXONE HYDROCHLORIDE DIHYDRATE 2; .5 MG/1; MG/1
TABLET SUBLINGUAL
Qty: 60 TABLET | Refills: 0 | Status: CANCELLED | OUTPATIENT
Start: 2019-07-03

## 2019-07-03 RX ORDER — METOPROLOL SUCCINATE 25 MG/1
TABLET, EXTENDED RELEASE ORAL
Qty: 90 TABLET | Refills: 0 | Status: SHIPPED | OUTPATIENT
Start: 2019-07-03 | End: 2019-11-06

## 2019-07-03 NOTE — TELEPHONE ENCOUNTER
Panel Management Review      Patient has the following on her problem list:     Hypertension   Last three blood pressure readings:  BP Readings from Last 3 Encounters:   05/08/19 136/60   11/05/18 (!) 171/95   09/13/18 97/59     Blood pressure: Passed    HTN Guidelines:  Less than 140/90      Composite cancer screening  Chart review shows that this patient is due/due soon for the following Pap Smear and Mammogram  Summary:    Patient is due/failing the following:   MAMMOGRAM, PAP and PHYSICAL    Action needed:   Patient needs office visit for Mammogram, Pap and Physical.    Type of outreach:    Phone, spoke to patient.  Patient will call back and schedule an appointment after finding out appointments days for her .     Questions for provider review:    None                                                                                                                                    Mariel Zhou MA      .

## 2019-07-03 NOTE — TELEPHONE ENCOUNTER
Dr West    Routing refill request to provider for review/approval because:  Drug not on the [facility] refill protocol      check done last fill 5/15/19 for 60, Dr West    Left  for pt on her cell, PCP is out of clinic until Monday    Shelli Live RN   [facility]

## 2019-07-03 NOTE — TELEPHONE ENCOUNTER
Controlled Substance Refill Request for BUPRENORPHINE/NALOX 2MG/0.5MG SL TB  Problem List Complete:  No     PROVIDER TO CONSIDER COMPLETION OF PROBLEM LIST AND OVERVIEW/CONTROLLED SUBSTANCE AGREEMENT    Last Written Prescription Date:  05/08/2019  Last Fill Quantity: 60,   # refills: 0    THE MOST RECENT OFFICE VISIT MUST BE WITHIN THE PAST 3 MONTHS. AT LEAST ONE FACE TO FACE VISIT MUST OCCUR EVERY 6 MONTHS. ADDITIONAL VISITS CAN BE VIRTUAL.  (THIS STATEMENT SHOULD BE DELETED.)    Last Office Visit with [facility] primary care provider: 05/31/2019    Future Office visit:   Next 5 appointments (look out 90 days)    Jul 12, 2019  2:00 PM CDT  Office Visit with Ishaan West MD  [facility] ([facility]) [address] [place] [id number] [phone]          Controlled substance agreement:   Encounter-Level CSA:    There are no encounter-level csa.     Patient-Level CSA:    There are no patient-level csa.         Last Urine Drug Screen: No results found for: CDAUT, No results found for: COMDAT, No results found for: THC13, PCP13, COC13, MAMP13, OPI13, AMP13, BZO13, TCA13, MTD13, BAR13, OXY13, PPX13, BUP13     Processing:  [email or web address]     [email or web address]       checked in past 3 months?  No, route to RN

## 2019-07-03 NOTE — TELEPHONE ENCOUNTER
"Requested Prescriptions   Pending Prescriptions Disp Refills     metoprolol succinate ER (TOPROL-XL) 25 MG 24 hr tablet  Last Written Prescription Date:  04/30/2018  Last Fill Quantity: 90,  # refills: 3   Last office visit: 5/8/2019 with prescribing provider:  05/31/2019   Future Office Visit:   Next 5 appointments (look out 90 days)    Jul 12, 2019  2:00 PM CDT  Office Visit with Ishaan West MD  [facility] ([facility]) [address] [place] [id number] [phone]        90 tablet 3       Beta-Blockers Protocol Passed - 7/3/2019  9:21 AM        Passed - Blood pressure under 140/90 in past 12 months     BP Readings from Last 3 Encounters:   05/08/19 136/60   11/05/18 (!) 171/95   09/13/18 97/59                 Passed - Patient is age 6 or older        Passed - Recent (12 mo) or future (30 days) visit within the authorizing provider's specialty     Patient had office visit in the last 12 months or has a visit in the next 30 days with authorizing provider or within the authorizing provider's specialty.  See \"Patient Info\" tab in inbasket, or \"Choose Columns\" in Meds & Orders section of the refill encounter.              Passed - Medication is active on med list        buprenorphine-naloxone (SUBOXONE) 2-0.5 MG SUBL sublingual tablet 60 tablet 0     Sig: PLACE 1 TABLET UNDER TONGUE TWICE DAILY       There is no refill protocol information for this order        "

## 2019-07-03 NOTE — TELEPHONE ENCOUNTER
Prescription approved per [facility] Refill Protocol.    suboxone refill request sent to Dr West see other refill request today    Shelli Live RN   [facility]

## 2019-07-07 ENCOUNTER — MEDICAL CORRESPONDENCE (OUTPATIENT)
Dept: HEALTH INFORMATION MANAGEMENT | Facility: CLINIC | Age: 64
End: 2019-07-07

## 2019-07-07 DIAGNOSIS — Z53.9 DIAGNOSIS NOT YET DEFINED: Primary | ICD-10-CM

## 2019-07-07 PROCEDURE — G0180 MD CERTIFICATION HHA PATIENT: HCPCS | Performed by: FAMILY MEDICINE

## 2019-07-12 ENCOUNTER — OFFICE VISIT (OUTPATIENT)
Dept: FAMILY MEDICINE | Facility: CLINIC | Age: 64
End: 2019-07-12
Payer: COMMERCIAL

## 2019-07-12 VITALS
BODY MASS INDEX: 19.19 KG/M2 | TEMPERATURE: 97.8 F | SYSTOLIC BLOOD PRESSURE: 140 MMHG | OXYGEN SATURATION: 98 % | HEIGHT: 66 IN | HEART RATE: 80 BPM | WEIGHT: 119.4 LBS | DIASTOLIC BLOOD PRESSURE: 70 MMHG

## 2019-07-12 DIAGNOSIS — B18.2 CHRONIC HEPATITIS C WITHOUT HEPATIC COMA (H): ICD-10-CM

## 2019-07-12 DIAGNOSIS — R73.9 HYPERGLYCEMIA: ICD-10-CM

## 2019-07-12 DIAGNOSIS — G25.81 RESTLESS LEGS SYNDROME (RLS): ICD-10-CM

## 2019-07-12 DIAGNOSIS — D63.8 ANEMIA IN OTHER CHRONIC DISEASES CLASSIFIED ELSEWHERE: ICD-10-CM

## 2019-07-12 DIAGNOSIS — F41.8 ANXIETY ASSOCIATED WITH DEPRESSION: ICD-10-CM

## 2019-07-12 DIAGNOSIS — F17.200 SMOKER: ICD-10-CM

## 2019-07-12 DIAGNOSIS — F11.20 NARCOTIC DEPENDENCE (H): Primary | ICD-10-CM

## 2019-07-12 LAB
ALBUMIN SERPL-MCNC: 3.2 G/DL (ref 3.4–5)
ALP SERPL-CCNC: 125 U/L (ref 40–150)
ALT SERPL W P-5'-P-CCNC: 29 U/L (ref 0–50)
AMMONIA PLAS-SCNC: 33 UMOL/L (ref 10–50)
ANION GAP SERPL CALCULATED.3IONS-SCNC: 9 MMOL/L (ref 3–14)
AST SERPL W P-5'-P-CCNC: 26 U/L (ref 0–45)
BILIRUB SERPL-MCNC: 0.4 MG/DL (ref 0.2–1.3)
BUN SERPL-MCNC: 24 MG/DL (ref 7–30)
CALCIUM SERPL-MCNC: 9.3 MG/DL (ref 8.5–10.1)
CHLORIDE SERPL-SCNC: 107 MMOL/L (ref 94–109)
CO2 SERPL-SCNC: 22 MMOL/L (ref 20–32)
CREAT SERPL-MCNC: 0.85 MG/DL (ref 0.52–1.04)
GFR SERPL CREATININE-BSD FRML MDRD: 72 ML/MIN/{1.73_M2}
GLUCOSE SERPL-MCNC: 144 MG/DL (ref 70–99)
HGB BLD-MCNC: 9.4 G/DL (ref 11.7–15.7)
POTASSIUM SERPL-SCNC: 4.5 MMOL/L (ref 3.4–5.3)
PROT SERPL-MCNC: 8.5 G/DL (ref 6.8–8.8)
SODIUM SERPL-SCNC: 138 MMOL/L (ref 133–144)

## 2019-07-12 PROCEDURE — 85018 HEMOGLOBIN: CPT | Performed by: FAMILY MEDICINE

## 2019-07-12 PROCEDURE — 36415 COLL VENOUS BLD VENIPUNCTURE: CPT | Performed by: FAMILY MEDICINE

## 2019-07-12 PROCEDURE — 99214 OFFICE O/P EST MOD 30 MIN: CPT | Performed by: FAMILY MEDICINE

## 2019-07-12 PROCEDURE — 80053 COMPREHEN METABOLIC PANEL: CPT | Performed by: FAMILY MEDICINE

## 2019-07-12 PROCEDURE — 82140 ASSAY OF AMMONIA: CPT | Performed by: FAMILY MEDICINE

## 2019-07-12 RX ORDER — BUPROPION HYDROCHLORIDE 150 MG/1
150 TABLET, FILM COATED, EXTENDED RELEASE ORAL 2 TIMES DAILY
Qty: 60 TABLET | Refills: 2 | Status: SHIPPED | OUTPATIENT
Start: 2019-07-12 | End: 2019-11-18

## 2019-07-12 RX ORDER — DULOXETIN HYDROCHLORIDE 20 MG/1
20 CAPSULE, DELAYED RELEASE ORAL 2 TIMES DAILY
Qty: 180 CAPSULE | Refills: 3 | COMMUNITY
Start: 2019-07-12 | End: 2019-08-09

## 2019-07-12 RX ORDER — BUPRENORPHINE HYDROCHLORIDE AND NALOXONE HYDROCHLORIDE DIHYDRATE 2; .5 MG/1; MG/1
TABLET SUBLINGUAL
Qty: 60 TABLET | Refills: 0 | Status: SHIPPED | OUTPATIENT
Start: 2019-07-12 | End: 2019-08-15

## 2019-07-12 RX ORDER — PRAMIPEXOLE DIHYDROCHLORIDE 0.12 MG/1
TABLET ORAL
Qty: 90 TABLET | Refills: 3 | COMMUNITY
Start: 2019-07-12 | End: 2019-11-18

## 2019-07-12 ASSESSMENT — MIFFLIN-ST. JEOR: SCORE: 1108.34

## 2019-07-12 NOTE — PROGRESS NOTES
Subjective     Josette Palacios is a 64 year old female who presents to clinic today for the following health issues:    HPI     Hospital Follow-up Visit:    Hospital/Nursing Home/IP Rehab Facility: [facility]  Date of Admission: 6/6/19  Date of Discharge: 6/18/2019  Reason(s) for Admission: Shortness of breath            Problems taking medications regularly:  None       Medication changes since discharge: None       Problems adhering to non-medication therapy:  None     Coding guidelines for this visit:  Type of Medical   Decision Making Face-to-Face Visit       within 7 Days of discharge Face-to-Face Visit        within 14 days of discharge   Moderate Complexity 25084 [id number]   High Complexity 55436 10089          Patient was in the hospital on 6/6/19 for pneumonia. Her  states that patient went into A-fib while in the hospital, with a heart rate of over 200. She is not currently on home oxygen, her O2 today is 98%. Patient plants on beginning PT soon. She was smoking cigarettes before her hospitalization and has continued since she was discharged. Patient is interested in strategies that would help her quit. She believes she has tried Chantix in the past, does not believe she has tried Wellbutrin. Patient expresses that her sobriety is going well, has not been drinking alcohol. She is taking Suboxone, two .5 mg tablets daily.      Patient Active Problem List   Diagnosis     Perimenopausal     Nicotine dependence     Anxiety associated with depression     Pain in joint, lower leg     Ascites     Hypertension, benign essential, goal below 140/90     Alcohol dependence with other alcohol-induced disorder (H)     Closed fracture of shaft of left tibia with nonunion, unspecified fracture morphology, subsequent encounter     Low vitamin D level     Alcohol dependence, episodic (H)     Encounter for monitoring Suboxone maintenance therapy     Eczema, unspecified type     Chronic hepatitis C without  hepatic coma (H)     History of herpes genitalis     Past Surgical History:   Procedure Laterality Date     breast implants       ESOPHAGOSCOPY, GASTROSCOPY, DUODENOSCOPY (EGD), COMBINED  1/24/2014    Procedure: COMBINED ESOPHAGOSCOPY, GASTROSCOPY, DUODENOSCOPY (EGD);;  Surgeon: Tony Moctezuma MD;  Location:  GI     ESOPHAGOSCOPY, GASTROSCOPY, DUODENOSCOPY (EGD), COMBINED N/A 9/13/2018    Procedure: COMBINED ESOPHAGOSCOPY, GASTROSCOPY, DUODENOSCOPY (EGD);  EGD/Colonoscopy ;  Surgeon: Saige Villalta MD;  Location:  GI     ORTHOPEDIC SURGERY      TKR left       Social History     Tobacco Use     Smoking status: Current Every Day Smoker     Packs/day: 0.50     Years: 25.00     Pack years: 12.50     Types: Cigarettes     Smokeless tobacco: Never Used   Substance Use Topics     Alcohol use: No     Alcohol/week: 0.0 oz     Family History   Problem Relation Age of Onset     Cancer Father          Current Outpatient Medications   Medication Sig Dispense Refill     amoxicillin (AMOXIL) 500 MG capsule TAKE FOUR CAPSULES BY MOUTH 1 HOUR BEFORE DENTAL APPOINTMENT 4 capsule 0     blood glucose (ACCU-CHEK CHASE) test strip Use to test blood sugar 3 times daily or as directed. 100 each 5     buPROPion (ZYBAN) 150 MG 12 hr tablet Take 1 tablet (150 mg) by mouth 2 times daily 60 tablet 2     DULoxetine (CYMBALTA) 20 MG capsule Take 1 capsule (20 mg) by mouth 2 times daily 180 capsule 3     metoprolol succinate ER (TOPROL-XL) 25 MG 24 hr tablet TAKE 1 TABLET(25 MG) BY MOUTH AT BEDTIME 90 tablet 0     nicotine polacrilex (RA MINI NICOTINE) 4 MG lozenge Place 1 lozenge (4 mg) inside cheek as needed for smoking cessation 360 tablet 1     order for DME Equipment being ordered: Home blood pressure cuff 1 Device 0     pramipexole (MIRAPEX) 0.125 MG tablet TAKE 1 TABLET(0.125 MG) BY MOUTH AT BEDTIME 90 tablet 3     sertraline (ZOLOFT) 50 MG tablet Take 1 tablet (50 mg) by mouth daily 90 tablet 3      "sofosbuvir-velpatasvir (EPCLUSA) 400-100 MG per tablet Take 1 tablet by mouth daily 28 tablet 2     traZODone (DESYREL) 50 MG tablet TAKE 1 TABLET(50 MG) BY MOUTH EVERY EVENING AS NEEDED FOR SLEEP 90 tablet 0     tretinoin (RETIN-A) 0.025 % cream Spread a pea size amount into affected area topically at bedtime.  Use sunscreen SPF>20. 60 g 3     valACYclovir (VALTREX) 500 MG tablet TAKE ONE TABLET BY MOUTH TWICE DAILY AS NEEDED 20 tablet 3     buprenorphine-naloxone (SUBOXONE) 2-0.5 MG SUBL sublingual tablet PLACE 1 TABLET UNDER TONGUE TWICE DAILY 60 tablet 0     No Known Allergies  BP Readings from Last 3 Encounters:   07/12/19 140/70   05/08/19 136/60   11/05/18 (!) 171/95    Wt Readings from Last 3 Encounters:   07/12/19 54.2 kg (119 lb 6.4 oz)   05/08/19 58.6 kg (129 lb 1.6 oz)   11/05/18 58.3 kg (128 lb 9.6 oz)           Reviewed and updated as needed this visit by Provider         Review of Systems   Denies headache, insomnia, chest pain, shortness of breath, cough, heartburn, bowel issues, bladder issues, neck pain, back pain, hip pain, knee pain, ankle pain, or foot pain. Remainder of ROS is negative unless otherwise noted above or in HPI.    This document serves as a record of the services and decisions personally performed and made by Ishaan West MD. It was created on his behalf by Bandar Celis, trained medical scribe. The creation of this document is based on the provider's statements to the medical scribe.  Bandar Celis 1:07 PM July 12, 2019      Objective    /70   Pulse 80   Temp 97.8  F (36.6  C) (Temporal)   Ht 1.676 m (5' 6\")   Wt 54.2 kg (119 lb 6.4 oz)   SpO2 98%   BMI 19.27 kg/m    Body mass index is 19.27 kg/m .  Physical Exam   GENERAL: healthy, alert and no distress  NEURO: Normal strength and tone, mentation intact and speech normal  PSYCH: mentation appears normal, affect normal/bright    Diagnostic Test Results:  Labs reviewed in Epic  No results found for this or any " previous visit (from the past 24 hour(s)).        Assessment & Plan    Encounter Diagnoses   Name Primary?     Narcotic dependence (H) Yes     Hyperglycemia      Anxiety associated with depression      Restless legs syndrome (RLS)      Anemia in other chronic diseases classified elsewhere      Chronic hepatitis C without hepatic coma (H)      Smoker        (F11.20) Narcotic dependence (H)  (primary encounter diagnosis)  Comment: Stable. Controlled by current medication.  Plan: buPROPion (ZYBAN) 150 MG 12 hr tablet        Continue taking medication. Follow up as needed.    (R73.9) Hyperglycemia  Comment: Patient will check her blood sugar at home.  Plan: blood glucose (ACCU-CHEK CHASE) test strip        Follow up as needed.    (F41.8) Anxiety associated with depression  Comment: Stable. Controlled by current medication.  Plan: sertraline (ZOLOFT) 50 MG tablet        Continue taking medication. Follow up as needed.    (G25.81) Restless legs syndrome (RLS)  Comment: Stable. Controlled by current medication.  Plan: pramipexole (MIRAPEX) 0.125 MG tablet        Continue taking medication. Follow up as needed.    Patient Instructions   Start taking Wellbutrin as directed.       Tobacco Cessation:   reports that she has been smoking cigarettes.  She has a 12.50 pack-year smoking history. She has never used smokeless tobacco.  Tobacco Cessation Action Plan: Pharmacotherapies : Zyban/Wellbutrin      The information in this document, created by the medical scribe for me, accurately reflects the services I personally performed and the decisions made by me. I have reviewed and approved this document for accuracy prior to leaving the patient care area.  July 12, 2019 1:07 PM    Ishaan West MD  [facility]

## 2019-07-13 DIAGNOSIS — F41.8 ANXIETY ASSOCIATED WITH DEPRESSION: ICD-10-CM

## 2019-07-15 RX ORDER — DULOXETIN HYDROCHLORIDE 20 MG/1
CAPSULE, DELAYED RELEASE ORAL
Qty: 180 CAPSULE | Refills: 0 | OUTPATIENT
Start: 2019-07-15

## 2019-07-15 NOTE — TELEPHONE ENCOUNTER
"Requested Prescriptions   Pending Prescriptions Disp Refills     DULoxetine (CYMBALTA) 20 MG capsule [Pharmacy Med Name: DULOXETINE DR 20MG CAPSULES] 180 capsule 0     Sig: TAKE 1 CAPSULE BY MOUTH TWICE DAILY  Last Written Prescription Date:  07/12/2019  Last Fill Quantity: 180,  # refills: 3   Last office visit: 7/12/2019 with prescribing provider:  07/12/2019   Future Office Visit:         Serotonin-Norepinephrine Reuptake Inhibitors  Failed - 7/13/2019  6:33 PM        Failed - Blood pressure under 140/90 in past 12 months     BP Readings from Last 3 Encounters:   07/12/19 140/70   05/08/19 136/60   11/05/18 (!) 171/95                 Passed - Recent (12 mo) or future (30 days) visit within the authorizing provider's specialty     Patient had office visit in the last 12 months or has a visit in the next 30 days with authorizing provider or within the authorizing provider's specialty.  See \"Patient Info\" tab in inbasket, or \"Choose Columns\" in Meds & Orders section of the refill encounter.              Passed - Medication is active on med list        Passed - Patient is age 18 or older        Passed - No active pregnancy on record        Passed - No positive pregnancy test in past 12 months        "

## 2019-07-15 NOTE — TELEPHONE ENCOUNTER
Message sent to pharmacy. Pt had Rx sent on 07/12/19 for #180, 3 refills.    Suki Andres RN  [facility]

## 2019-07-16 NOTE — RESULT ENCOUNTER NOTE
Please notify patient: send letter with copy of labs: mostly normal with minor abnormals: slightly low albumen and hemoglobin should improve with sobriety and good diet. Contact if questions, nice to see you!     Thanks Ishaan

## 2019-07-17 ENCOUNTER — TELEPHONE (OUTPATIENT)
Dept: FAMILY MEDICINE | Facility: CLINIC | Age: 64
End: 2019-07-17

## 2019-07-17 NOTE — TELEPHONE ENCOUNTER
Dr. West,    Received call from Kezia with [facility]. She is wondering if it is okay to split buPROPion (ZYBAN) 150 MG 12 hr tablet in half? The pharmacist said that they should not be split.    She states that their orders are to do 0.5 tabs once daily for two days, then 0.5 tabs twice daily for two days before going to the two tabs BID.     Please advise.    Can leave detailed VM on Kezia's cell-- it is a confidential line. [phone].    Suki Andres RN  [facility]

## 2019-07-17 NOTE — TELEPHONE ENCOUNTER
Kezia RN notified of message from Dr Jenna Live, RN   [facility]

## 2019-07-18 ENCOUNTER — TELEPHONE (OUTPATIENT)
Dept: FAMILY MEDICINE | Facility: CLINIC | Age: 64
End: 2019-07-18

## 2019-07-18 NOTE — TELEPHONE ENCOUNTER
[facility]  They want to know if ok to stop lasix.  She has no edema. She hasn't been taking it for a while  Per review of Epic pt has not been prescribed furosemide for a few years. Valentina said it was restarted while in hospital recently but she had no other details.    Valentina said [facility] Lanza is her regular .  [facility] called at [phone] to reach Valentina. No answer.     Pt seen 7/12 by Dr West for post hosp visit. In from 6/6-1/18 at [facility] for pneumonia and A Fib .      Pt contacted. The lasix was started when she was in the hospital. No swelling now. Did have swelling in hospital, mostly in hands, no longer present. Pt said Dr West told her to stop the lasix but no documentation on Epic for this.     Dr West,    1. OK to give order to homecare to stop lasix?    2. Pt and  want to know how she is to taper of the cigarettes .; She started the wellbutrin today.    Annalise Mendez, RN, BSN

## 2019-07-18 NOTE — TELEPHONE ENCOUNTER
Pts caregiver spoke with Signee yesterday but did not get confirmation to whether she can discontinue the Lasik medication.

## 2019-07-19 NOTE — TELEPHONE ENCOUNTER
OK to stop Lasix.     Suggest cutting smoking as follows:  1st day bupropion 3/4 number of cigs  2nd day bupropion 1/2 number of cigs  3rd day bupropion 1/4 number of cigs  4th day bupropion 0 cigs

## 2019-07-19 NOTE — TELEPHONE ENCOUNTER
Myla # [phone] cell detailed message left on secure VM and also spoke with pt and  with detailed message, also advised with the weather increased fluids and stay well hydrated    They verbalized understanding    Shelli Live RN   [facility]

## 2019-07-21 DIAGNOSIS — G25.81 RESTLESS LEGS SYNDROME (RLS): ICD-10-CM

## 2019-07-22 NOTE — TELEPHONE ENCOUNTER
Due to recent abnormal hgb Refill request to MD/primary care provider.    (rx on med list from 7/12/19 signed by MD but listed as historical so didn't go to pharmacy)      Annalise Mendez RN

## 2019-07-22 NOTE — TELEPHONE ENCOUNTER
"Requested Prescriptions   Pending Prescriptions Disp Refills     pramipexole (MIRAPEX) 0.125 MG tablet [Pharmacy Med Name: PRAMIPEXOLE 0.125MG TABLETS] 90 tablet 0     Sig: TAKE 1 TABLET(0.125 MG) BY MOUTH AT BEDTIME  Last Written Prescription Date:  07/12/2019  Last Fill Quantity: 90,  # refills: 3   Last office visit: 7/12/2019 with prescribing provider:  07/12/2019   Future Office Visit:         Antiparkinson's Agents Protocol Failed - 7/21/2019 12:47 PM        Failed - Blood pressure under 140/90 in past 12 months     BP Readings from Last 3 Encounters:   07/12/19 140/70   05/08/19 136/60   11/05/18 (!) 171/95                 Failed - CBC on record in past 12 months     Recent Labs   Lab Test 07/12/19  1449  07/02/18  1104   WBC  --   --  7.0   RBC  --   --  3.25*   HGB 9.4*   < > 9.2*   HCT  --   --  29.0*   PLT  --   --  168    < > = values in this interval not displayed.                 Passed - ALT on record in past 12 months         Recent Labs   Lab Test 07/12/19  1449   ALT 29             Passed - Serum Creatinine on file in past 12 months     Recent Labs   Lab Test 07/12/19  1449  03/10/14  1148  06/15/12  1045   CR 0.85   < >  --    < >  --    CREAT  --   --   --   --  0.6   CRPOC  --   --  0.4*  --   --     < > = values in this interval not displayed.             Passed - Medication is active on med list        Passed - Patient is age 18 or older        Passed - No active pregnancy on record        Passed - No positive pregnancy test in the past 12 months        Passed - Recent (6 mo) or future (30 days) visit within the authorizing provider's specialty     Patient had office visit in the last 6 months or has a visit in the next 30 days with authorizing provider or within the authorizing provider's specialty.  See \"Patient Info\" tab in inSprucelingsket, or \"Choose Columns\" in Meds & Orders section of the refill encounter.            "

## 2019-07-23 ENCOUNTER — TRANSFERRED RECORDS (OUTPATIENT)
Dept: HEALTH INFORMATION MANAGEMENT | Facility: CLINIC | Age: 64
End: 2019-07-23

## 2019-07-23 RX ORDER — PRAMIPEXOLE DIHYDROCHLORIDE 0.12 MG/1
TABLET ORAL
Qty: 90 TABLET | Refills: 0 | Status: SHIPPED | OUTPATIENT
Start: 2019-07-23 | End: 2019-11-18

## 2019-07-24 ENCOUNTER — MEDICAL CORRESPONDENCE (OUTPATIENT)
Dept: HEALTH INFORMATION MANAGEMENT | Facility: CLINIC | Age: 64
End: 2019-07-24

## 2019-08-01 ENCOUNTER — MEDICAL CORRESPONDENCE (OUTPATIENT)
Dept: HEALTH INFORMATION MANAGEMENT | Facility: CLINIC | Age: 64
End: 2019-08-01

## 2019-08-07 DIAGNOSIS — F41.8 ANXIETY ASSOCIATED WITH DEPRESSION: ICD-10-CM

## 2019-08-07 RX ORDER — DULOXETIN HYDROCHLORIDE 20 MG/1
CAPSULE, DELAYED RELEASE ORAL
Qty: 180 CAPSULE | Refills: 0 | OUTPATIENT
Start: 2019-08-07

## 2019-08-07 NOTE — TELEPHONE ENCOUNTER
Message sent in notes to pharmacy: Please dispense from Rx that was sent on 7/12/2019 for 180 capsules, 3 refills.    Suki Andres RN  [facility]

## 2019-08-07 NOTE — TELEPHONE ENCOUNTER
"Requested Prescriptions   Pending Prescriptions Disp Refills     DULoxetine (CYMBALTA) 20 MG capsule [Pharmacy Med Name: DULOXETINE DR 20MG CAPSULES] 180 capsule 0     Sig: TAKE 1 CAPSULE BY MOUTH TWICE DAILY  Last Written Prescription Date:  07/12/2019  Last Fill Quantity: 180,  # refills: 3   Last office visit: 7/12/2019 with prescribing provider:  07/12/2019   Future Office Visit:         Serotonin-Norepinephrine Reuptake Inhibitors  Failed - 8/7/2019 11:16 AM        Failed - Blood pressure under 140/90 in past 12 months     BP Readings from Last 3 Encounters:   07/12/19 140/70   05/08/19 136/60   11/05/18 (!) 171/95                 Passed - Recent (12 mo) or future (30 days) visit within the authorizing provider's specialty     Patient had office visit in the last 12 months or has a visit in the next 30 days with authorizing provider or within the authorizing provider's specialty.  See \"Patient Info\" tab in inbasket, or \"Choose Columns\" in Meds & Orders section of the refill encounter.              Passed - Medication is active on med list        Passed - Patient is age 18 or older        Passed - No active pregnancy on record        Passed - No positive pregnancy test in past 12 months        "

## 2019-08-09 RX ORDER — DULOXETIN HYDROCHLORIDE 20 MG/1
20 CAPSULE, DELAYED RELEASE ORAL 2 TIMES DAILY
Qty: 180 CAPSULE | Refills: 3 | Status: SHIPPED | OUTPATIENT
Start: 2019-08-09 | End: 2020-06-19 | Stop reason: DRUGHIGH

## 2019-08-09 NOTE — TELEPHONE ENCOUNTER
Prescription approved per [facility] Refill Protocol.    Shelli Live RN   [facility]

## 2019-08-12 ENCOUNTER — MEDICAL CORRESPONDENCE (OUTPATIENT)
Dept: HEALTH INFORMATION MANAGEMENT | Facility: CLINIC | Age: 64
End: 2019-08-12

## 2019-08-15 DIAGNOSIS — F11.20 NARCOTIC DEPENDENCE (H): ICD-10-CM

## 2019-08-15 NOTE — TELEPHONE ENCOUNTER
Dr. West;  Routing refill request to provider for review/approval because:  Drug not on the [facility] refill protocol     LOV: 07/12/2019    : last dispense 07/13/2019 #60/30 day supply prescribed by Dr. West    Thank You!  Disha Wong, RN  Triage Nurse

## 2019-08-16 RX ORDER — BUPRENORPHINE HYDROCHLORIDE AND NALOXONE HYDROCHLORIDE DIHYDRATE 2; .5 MG/1; MG/1
TABLET SUBLINGUAL
Qty: 60 TABLET | Refills: 0 | Status: SHIPPED | OUTPATIENT
Start: 2019-08-16 | End: 2019-10-09

## 2019-08-16 NOTE — TELEPHONE ENCOUNTER
Prescription for  buprenorphine-naloxone (SUBOXONE) 2-0.5 MG SUBL sublingual tablet  Faxed to [facility] at [phone]

## 2019-09-12 NOTE — TELEPHONE ENCOUNTER
Panel Management Review      Patient has the following on her problem list: None      Composite cancer screening  Chart review shows that this patient is due/due soon for the following Pap Smear and Mammogram  Summary:    Patient is due/failing the following:   MAMMOGRAM and PAP    Action needed:   Patient needs office visit for PAP and Mammogram.    Type of outreach:    Phone, left message for patient to call back.     Questions for provider review:    None                                                                                                                                    Maricruz Parrish    Care Management Coordinator - [facility]  [facility]

## 2019-09-16 PROBLEM — S82.202K: Status: RESOLVED | Noted: 2017-03-01 | Resolved: 2019-09-16

## 2019-10-09 DIAGNOSIS — F11.20 NARCOTIC DEPENDENCE (H): ICD-10-CM

## 2019-10-09 RX ORDER — BUPRENORPHINE HYDROCHLORIDE AND NALOXONE HYDROCHLORIDE DIHYDRATE 2; .5 MG/1; MG/1
TABLET SUBLINGUAL
Qty: 60 TABLET | Refills: 0 | Status: SHIPPED | OUTPATIENT
Start: 2019-10-09 | End: 2019-11-01

## 2019-10-09 NOTE — TELEPHONE ENCOUNTER
Last script in [place]  was written by PCP and was filled on 8/16/19 . Routed to PCP for review. Keke Nickerson RN October 9, 2019 11:01 AM

## 2019-10-09 NOTE — TELEPHONE ENCOUNTER
Controlled Substance Refill Request for BUPRENORPHINE/NALOX 2MG/0.5MG SL TB  Problem List Complete:  No     PROVIDER TO CONSIDER COMPLETION OF PROBLEM LIST AND OVERVIEW/CONTROLLED SUBSTANCE AGREEMENT    Last Written Prescription Date:  08/16/2019  Last Fill Quantity: 60,   # refills: 0    THE MOST RECENT OFFICE VISIT MUST BE WITHIN THE PAST 3 MONTHS. AT LEAST ONE FACE TO FACE VISIT MUST OCCUR EVERY 6 MONTHS. ADDITIONAL VISITS CAN BE VIRTUAL.  (THIS STATEMENT SHOULD BE DELETED.)    Last Office Visit with [facility] primary care provider: 10/02/2019    Future Office visit:   Next 5 appointments (look out 90 days)    Oct 21, 2019 11:30 AM CDT  PHYSICAL with Ishaan West MD  [facility] ([facility]) [address] [place] [id number] [phone]          Controlled substance agreement:   Encounter-Level CSA:    There are no encounter-level csa.     Patient-Level CSA:    There are no patient-level csa.         Last Urine Drug Screen: No results found for: CDAUT, No results found for: COMDAT, No results found for: THC13, PCP13, COC13, MAMP13, OPI13, AMP13, BZO13, TCA13, MTD13, BAR13, OXY13, PPX13, BUP13     [facility]  [facility] [address]     [email or web address]       checked in past 3 months?  No, route to RN

## 2019-10-10 NOTE — TELEPHONE ENCOUNTER
Pt calling because [facility] claims that they did not receive the prescription. Pt would like it resent and would also like to be called when it is sent in. Pt can be reached at [phone] luciano

## 2019-10-10 NOTE — TELEPHONE ENCOUNTER
Prescription for   BUPRENORPHINE/NALOX 2MG/0.5MG SL TB  Faxed to [facility] [phone]

## 2019-10-10 NOTE — TELEPHONE ENCOUNTER
Writer called patient and notified her Rx has been refaxed. Advised return call to clinic if patient is still having problems.Patient verbalized understanding    Thank You!  Disha Wong, [place]  Triage Nurse

## 2019-11-06 DIAGNOSIS — I10 HYPERTENSION, BENIGN ESSENTIAL, GOAL BELOW 140/90: ICD-10-CM

## 2019-11-06 RX ORDER — METOPROLOL SUCCINATE 25 MG/1
TABLET, EXTENDED RELEASE ORAL
Qty: 90 TABLET | Refills: 0 | Status: SHIPPED | OUTPATIENT
Start: 2019-11-06 | End: 2019-11-18

## 2019-11-06 NOTE — TELEPHONE ENCOUNTER
Prescription approved per [facility] Refill Protocol. Keke Nickerson RN November 6, 2019 12:38 PM

## 2019-11-06 NOTE — TELEPHONE ENCOUNTER
"Requested Prescriptions   Pending Prescriptions Disp Refills     metoprolol succinate ER (TOPROL-XL) 25 MG 24 hr tablet 90 tablet 0     Sig: TAKE 1 TABLET(25 MG) BY MOUTH AT BEDTIME  Last Written Prescription Date:  07/03/2019  Last Fill Quantity: 90,  # refills: 0   Last office visit: 7/12/2019 with prescribing provider:  07/12/2019   Future Office Visit:   Next 5 appointments (look out 90 days)    Nov 18, 2019  5:00 PM CST  Office Visit with Ishaan West MD  [facility] ([facility]) [address] [place] [id number] [phone]              Beta-Blockers Protocol Failed - 11/6/2019  9:02 AM        Failed - Blood pressure under 140/90 in past 12 months     BP Readings from Last 3 Encounters:   07/12/19 140/70   05/08/19 136/60   11/05/18 (!) 171/95                 Passed - Patient is age 6 or older        Passed - Recent (12 mo) or future (30 days) visit within the authorizing provider's specialty     Patient has had an office visit with the authorizing provider or a provider within the authorizing providers department within the previous 12 mos or has a future within next 30 days. See \"Patient Info\" tab in inbasket, or \"Choose Columns\" in Meds & Orders section of the refill encounter.              Passed - Medication is active on med list        "

## 2019-11-08 ENCOUNTER — TELEPHONE (OUTPATIENT)
Dept: FAMILY MEDICINE | Facility: CLINIC | Age: 64
End: 2019-11-08

## 2019-11-08 NOTE — TELEPHONE ENCOUNTER
Says friend needs help and is wondering if Dr. West would see a new patient. Can call patient back at [phone]. Ok to leave message.

## 2019-11-08 NOTE — TELEPHONE ENCOUNTER
Dr. West    Please see patients message. You don't see new patients correct? Please advise    Thanks  Radha Moulton RN   [facility]

## 2019-11-11 NOTE — TELEPHONE ENCOUNTER
Left detailed message, offered that other providers are able to take new pt  No further action on this encounter    Shelli Live RN   [facility]

## 2019-11-14 DIAGNOSIS — F11.20 NARCOTIC DEPENDENCE (H): ICD-10-CM

## 2019-11-14 NOTE — TELEPHONE ENCOUNTER
Requested Prescriptions   Pending Prescriptions Disp Refills     buprenorphine-naloxone (SUBOXONE) 2-0.5 MG SUBL sublingual tablet 60 tablet 0     Sig: DISSOLVE 1 TABLET UNDER THE TONGUE TWICE DAILY       There is no refill protocol information for this order      Last Written Prescription Date:  11/1/19  Last Fill Quantity: 60 tablets,   # refills: 0  Last Office Visit: 7/12/19 with Dr. West   Future Office visit:    Next 5 appointments (look out 90 days)    Nov 18, 2019  5:00 PM CST  Office Visit with Ishaan West MD  [facility] ([facility] [address] [place] [id number] [phone]       Routing refill request to provider for review/approval because:  Drug not on the [facility], [facility] or [facility] refill protocol or controlled substance

## 2019-11-15 RX ORDER — BUPRENORPHINE HYDROCHLORIDE AND NALOXONE HYDROCHLORIDE DIHYDRATE 2; .5 MG/1; MG/1
TABLET SUBLINGUAL
Qty: 60 TABLET | Refills: 0 | Status: SHIPPED | OUTPATIENT
Start: 2019-11-15 | End: 2020-01-03

## 2019-11-15 NOTE — TELEPHONE ENCOUNTER
Prescription for   buprenorphine-naloxone (SUBOXONE) 2-0.5 MG SUBL sublingual tablet  Faxed to [facility]y [facility] at [phone]

## 2019-11-18 ENCOUNTER — OFFICE VISIT (OUTPATIENT)
Dept: FAMILY MEDICINE | Facility: CLINIC | Age: 64
End: 2019-11-18
Payer: COMMERCIAL

## 2019-11-18 VITALS
HEART RATE: 72 BPM | BODY MASS INDEX: 20.09 KG/M2 | TEMPERATURE: 98.4 F | HEIGHT: 66 IN | DIASTOLIC BLOOD PRESSURE: 72 MMHG | RESPIRATION RATE: 12 BRPM | OXYGEN SATURATION: 98 % | WEIGHT: 125 LBS | SYSTOLIC BLOOD PRESSURE: 128 MMHG

## 2019-11-18 DIAGNOSIS — R30.0 DYSURIA: Primary | ICD-10-CM

## 2019-11-18 DIAGNOSIS — I10 HYPERTENSION, BENIGN ESSENTIAL, GOAL BELOW 140/90: ICD-10-CM

## 2019-11-18 DIAGNOSIS — G25.81 RESTLESS LEGS SYNDROME (RLS): ICD-10-CM

## 2019-11-18 DIAGNOSIS — G47.00 INSOMNIA, UNSPECIFIED TYPE: ICD-10-CM

## 2019-11-18 LAB
ALBUMIN UR-MCNC: 100 MG/DL
APPEARANCE UR: ABNORMAL
BACTERIA #/AREA URNS HPF: ABNORMAL /HPF
BILIRUB UR QL STRIP: NEGATIVE
COLOR UR AUTO: YELLOW
GLUCOSE UR STRIP-MCNC: NEGATIVE MG/DL
HGB UR QL STRIP: ABNORMAL
KETONES UR STRIP-MCNC: NEGATIVE MG/DL
LEUKOCYTE ESTERASE UR QL STRIP: ABNORMAL
NITRATE UR QL: POSITIVE
NON-SQ EPI CELLS #/AREA URNS LPF: ABNORMAL /LPF
PH UR STRIP: 6 PH (ref 5–7)
RBC #/AREA URNS AUTO: ABNORMAL /HPF
SOURCE: ABNORMAL
SP GR UR STRIP: 1.02 (ref 1–1.03)
UROBILINOGEN UR STRIP-ACNC: 0.2 EU/DL (ref 0.2–1)
WBC #/AREA URNS AUTO: ABNORMAL /HPF

## 2019-11-18 PROCEDURE — 81001 URINALYSIS AUTO W/SCOPE: CPT | Performed by: FAMILY MEDICINE

## 2019-11-18 PROCEDURE — 99214 OFFICE O/P EST MOD 30 MIN: CPT | Performed by: FAMILY MEDICINE

## 2019-11-18 RX ORDER — ESTRADIOL 0.1 MG/G
2 CREAM VAGINAL
Qty: 42.5 G | Refills: 3 | Status: SHIPPED | OUTPATIENT
Start: 2019-11-18 | End: 2020-11-09

## 2019-11-18 RX ORDER — METOPROLOL SUCCINATE 25 MG/1
TABLET, EXTENDED RELEASE ORAL
Qty: 90 TABLET | Refills: 3 | Status: SHIPPED | OUTPATIENT
Start: 2019-11-18 | End: 2020-03-23

## 2019-11-18 RX ORDER — PRAMIPEXOLE DIHYDROCHLORIDE 0.12 MG/1
TABLET ORAL
Qty: 90 TABLET | Refills: 3 | Status: SHIPPED | OUTPATIENT
Start: 2019-11-18 | End: 2020-04-22

## 2019-11-18 RX ORDER — TRAZODONE HYDROCHLORIDE 50 MG/1
TABLET, FILM COATED ORAL
Qty: 90 TABLET | Refills: 3 | Status: SHIPPED | OUTPATIENT
Start: 2019-11-18 | End: 2020-11-09

## 2019-11-18 ASSESSMENT — MIFFLIN-ST. JEOR: SCORE: 1133.75

## 2019-11-18 NOTE — TELEPHONE ENCOUNTER
Prescription approved per [facility] Refill Protocol.  Kristie Boland RN on 11/18/2019 at 12:41 PM

## 2019-11-18 NOTE — PROGRESS NOTES
Subjective     Josette Palacios is a 64 year old female who presents to clinic today for the following health issues:    HPI   URINARY TRACT SYMPTOMS      Duration: several days    Description  dysuria, frequency, urgency and no hematuria    Intensity:  moderate    Accompanying signs and symptoms:  Fever/chills: no   Flank pain no   Nausea and vomiting: no   Vaginal symptoms: none  Abdominal/Pelvic Pain: no     History  History of frequent UTI's: YES  History of kidney stones: no   Sexually Active: YES  Possibility of pregnancy: No    Precipitating or alleviating factors: None    Therapies tried and outcome: none     Narcotic Dependence- on suboxone twice daily most days, without side effects. Occasionally relapses with a drink of alcohol.    Patient Active Problem List   Diagnosis     Nicotine dependence     Anxiety associated with depression     Pain in joint, lower leg     Ascites     Hypertension, benign essential, goal below 140/90     Alcohol dependence with other alcohol-induced disorder (H)     Low vitamin D level     Alcohol dependence, episodic (H)     Encounter for monitoring Suboxone maintenance therapy     Eczema, unspecified type     Chronic hepatitis C without hepatic coma (H)     History of herpes genitalis     Past Surgical History:   Procedure Laterality Date     breast implants       ESOPHAGOSCOPY, GASTROSCOPY, DUODENOSCOPY (EGD), COMBINED  1/24/2014    Procedure: COMBINED ESOPHAGOSCOPY, GASTROSCOPY, DUODENOSCOPY (EGD);;  Surgeon: Tony Moctezuma MD;  Location:  GI     ESOPHAGOSCOPY, GASTROSCOPY, DUODENOSCOPY (EGD), COMBINED N/A 9/13/2018    Procedure: COMBINED ESOPHAGOSCOPY, GASTROSCOPY, DUODENOSCOPY (EGD);  EGD/Colonoscopy ;  Surgeon: Saige Villalta MD;  Location:  GI     ORTHOPEDIC SURGERY      TKR left       Social History     Tobacco Use     Smoking status: Current Every Day Smoker     Packs/day: 0.50     Years: 25.00     Pack years: 12.50     Types: Cigarettes      Smokeless tobacco: Never Used   Substance Use Topics     Alcohol use: No     Alcohol/week: 0.0 standard drinks     Family History   Problem Relation Age of Onset     Cancer Father          Current Outpatient Medications   Medication Sig Dispense Refill     buprenorphine-naloxone (SUBOXONE) 2-0.5 MG SUBL sublingual tablet DISSOLVE 1 TABLET UNDER THE TONGUE TWICE DAILY 60 tablet 0     DULoxetine (CYMBALTA) 20 MG capsule Take 1 capsule (20 mg) by mouth 2 times daily 180 capsule 3     estradiol (ESTRACE) 0.1 MG/GM vaginal cream Place 2 g vaginally twice a week 42.5 g 3     metoprolol succinate ER (TOPROL-XL) 25 MG 24 hr tablet TAKE 1 TABLET(25 MG) BY MOUTH AT BEDTIME 90 tablet 3     order for DME Equipment being ordered: Home blood pressure cuff 1 Device 0     pramipexole (MIRAPEX) 0.125 MG tablet TAKE 1 TABLET(0.125 MG) BY MOUTH AT BEDTIME 90 tablet 3     sertraline (ZOLOFT) 50 MG tablet Take 1 tablet (50 mg) by mouth daily 90 tablet 3     traZODone (DESYREL) 50 MG tablet TAKE 1 TABLET(50 MG) BY MOUTH EVERY EVENING AS NEEDED FOR SLEEP 90 tablet 3     tretinoin (RETIN-A) 0.025 % cream Spread a pea size amount into affected area topically at bedtime.  Use sunscreen SPF>20. 60 g 3     valACYclovir (VALTREX) 500 MG tablet TAKE ONE TABLET BY MOUTH TWICE DAILY AS NEEDED 20 tablet 3     amoxicillin (AMOXIL) 500 MG capsule TAKE FOUR CAPSULES BY MOUTH 1 HOUR BEFORE DENTAL APPOINTMENT (Patient not taking: Reported on 11/18/2019) 4 capsule 0     blood glucose (ACCU-CHEK CHASE) test strip Use to test blood sugar 3 times daily or as directed. (Patient not taking: Reported on 11/18/2019) 100 each 5     nicotine polacrilex (RA MINI NICOTINE) 4 MG lozenge Place 1 lozenge (4 mg) inside cheek as needed for smoking cessation (Patient not taking: Reported on 11/18/2019) 360 tablet 1     nitroFURantoin macrocrystal-monohydrate (MACROBID) 100 MG capsule Take 1 capsule (100 mg) by mouth 2 times daily 14 capsule 0     sofosbuvir-velpatasvir  (EPCLUSA) 400-100 MG per tablet Take 1 tablet by mouth daily 28 tablet 2     No Known Allergies  BP Readings from Last 3 Encounters:   11/18/19 128/72   07/12/19 140/70   05/08/19 136/60    Wt Readings from Last 3 Encounters:   11/18/19 56.7 kg (125 lb)   07/12/19 54.2 kg (119 lb 6.4 oz)   05/08/19 58.6 kg (129 lb 1.6 oz)                    Chronic Hep C liver disease. No recent appointment with hepatologist.  Reviewed and updated as needed this visit by Provider         Review of Systems   ROS COMP: Constitutional, HEENT, cardiovascular, pulmonary, gi and gu systems are negative, except as otherwise noted.      Objective    There were no vitals taken for this visit.  There is no height or weight on file to calculate BMI.  Physical Exam   GENERAL: healthy, alert and no distress  NECK: no adenopathy, no asymmetry, masses, or scars and thyroid normal to palpation  RESP: lungs clear to auscultation - no rales, rhonchi or wheezes  CV: regular rate and rhythm, normal S1 S2, no S3 or S4, no murmur, click or rub, no peripheral edema and peripheral pulses strong  ABDOMEN: soft, nontender, no hepatosplenomegaly, no masses and bowel sounds normal  MS: no gross musculoskeletal defects noted, no edema  SKIN: no suspicious lesions or rashes  NEURO: Normal strength and tone, mentation intact and speech normal  PSYCH: mentation appears normal, affect normal/bright    Diagnostic Test Results:  Results for orders placed or performed in visit on 11/18/19   *UA reflex to Microscopic     Status: Abnormal   Result Value Ref Range    Color Urine Yellow     Appearance Urine Slightly Cloudy     Glucose Urine Negative NEG^Negative mg/dL    Bilirubin Urine Negative NEG^Negative    Ketones Urine Negative NEG^Negative mg/dL    Specific Gravity Urine 1.020 1.003 - 1.035    Blood Urine Small (A) NEG^Negative    pH Urine 6.0 5.0 - 7.0 pH    Protein Albumin Urine 100 (A) NEG^Negative mg/dL    Urobilinogen Urine 0.2 0.2 - 1.0 EU/dL    Nitrite Urine  Positive (A) NEG^Negative    Leukocyte Esterase Urine Small (A) NEG^Negative    Source Midstream Urine    Urine Microscopic     Status: Abnormal   Result Value Ref Range    WBC Urine 25-50 (A) OTO5^0 - 5 /HPF    RBC Urine O - 2 OTO2^O - 2 /HPF    Squamous Epithelial /LPF Urine Moderate (A) FEW^Few /LPF    Bacteria Urine Few (A) NEG^Negative /HPF           Assessment & Plan       ICD-10-CM    1. Dysuria R30.0 *UA reflex to Microscopic     estradiol (ESTRACE) 0.1 MG/GM vaginal cream     Urine Microscopic   2. Hypertension, benign essential, goal below 140/90 I10 metoprolol succinate ER (TOPROL-XL) 25 MG 24 hr tablet   3. Insomnia, unspecified type G47.00 traZODone (DESYREL) 50 MG tablet        Tobacco Cessation:   reports that she has been smoking cigarettes. She has a 12.50 pack-year smoking history. She has never used smokeless tobacco.  Tobacco Cessation Action Plan: Information offered: Patient not interested at this time    Patient Instructions   Followup with Liver MD, here with suboxone refills.    Ishaan West MD  [facility]

## 2019-11-18 NOTE — LETTER
[address] [address]  Phone: [phone]  Fax: [phone]    November 18, 2019        Josette Palacios  [address] [facility] [address]          To whom it may concern:    RE: Josette Hongneno    Ms Palacios is a patient of ours. I recommend she be excused from jury duty for     medical reasons.     Please contact me for questions or concerns.      Sincerely,          Ishaan West MD

## 2019-11-18 NOTE — TELEPHONE ENCOUNTER
Requested Prescriptions   Pending Prescriptions Disp Refills     pramipexole (MIRAPEX) 0.125 MG tablet 90 tablet 3     Sig: TAKE 1 TABLET(0.125 MG) BY MOUTH AT BEDTIME  Last Written Prescription Date:  07/23/2019  Last Fill Quantity: 90,  # refills: 0   Last office visit: 7/12/2019 with prescribing provider:  07/12/2019   Future Office Visit:   Next 5 appointments (look out 90 days)    Nov 18, 2019  5:00 PM CST  Office Visit with Ishaan West MD  [facility] [place] ([facility]) [address] [place] [id number] [phone]              Antiparkinson's Agents Protocol Failed - 11/18/2019  9:58 AM        Failed - Blood pressure under 140/90 in past 12 months     BP Readings from Last 3 Encounters:   07/12/19 140/70   05/08/19 136/60   11/05/18 (!) 171/95                 Failed - CBC on record in past 12 months     Recent Labs   Lab Test 07/12/19  1449  07/02/18  1104   WBC  --   --  7.0   RBC  --   --  3.25*   HGB 9.4*   < > 9.2*   HCT  --   --  29.0*   PLT  --   --  168    < > = values in this interval not displayed.                 Passed - ALT on record in past 12 months         Recent Labs   Lab Test 07/12/19  1449   ALT 29             Passed - Serum Creatinine on file in past 12 months     Recent Labs   Lab Test 07/12/19  1449  03/10/14  1148  06/15/12  1045   CR 0.85   < >  --    < >  --    CREAT  --   --   --   --  0.6   CRPOC  --   --  0.4*  --   --     < > = values in this interval not displayed.             Passed - Medication is active on med list        Passed - Patient is age 18 or older        Passed - No active pregnancy on record        Passed - No positive pregnancy test in the past 12 months        Passed - Recent (6 mo) or future (30 days) visit within the authorizing provider's specialty     Patient had office visit in the last 6 months or has a visit in the next 30 days with authorizing provider or within the authorizing provider's  "specialty.  See \"Patient Info\" tab in inbasket, or \"Choose Columns\" in Meds & Orders section of the refill encounter.            "

## 2019-11-19 DIAGNOSIS — N30.00 ACUTE CYSTITIS WITHOUT HEMATURIA: Primary | ICD-10-CM

## 2019-11-19 RX ORDER — NITROFURANTOIN 25; 75 MG/1; MG/1
100 CAPSULE ORAL 2 TIMES DAILY
Qty: 14 CAPSULE | Refills: 0 | Status: SHIPPED | OUTPATIENT
Start: 2019-11-19 | End: 2020-11-09

## 2019-11-19 NOTE — RESULT ENCOUNTER NOTE
Haroldo Finch: Your preliminary urine results look like an infection: recommend macrobid (nitrofurantoin) 100 mg twice daily for a week. Contact if questions; let me know if not improving.     Ishaan

## 2019-11-24 ENCOUNTER — TRANSFERRED RECORDS (OUTPATIENT)
Dept: HEALTH INFORMATION MANAGEMENT | Facility: CLINIC | Age: 64
End: 2019-11-24

## 2019-12-02 ENCOUNTER — TELEPHONE (OUTPATIENT)
Dept: FAMILY MEDICINE | Facility: CLINIC | Age: 64
End: 2019-12-02

## 2019-12-02 NOTE — TELEPHONE ENCOUNTER
Panel Management Review      Patient has the following on her problem list:     Diabetes    ASA: Failed    Last A1C  Lab Results   Component Value Date    A1C 6.0 06/05/2019    A1C 5.9 04/27/2018    A1C 5.5 11/22/2017    A1C 5.7 02/15/2017    A1C 4.9 08/05/2016     A1C tested: FAILED    Last LDL:    Lab Results   Component Value Date    CHOL 144 11/22/2017     Lab Results   Component Value Date     11/22/2017     Lab Results   Component Value Date    LDL 31 11/22/2017     Lab Results   Component Value Date    TRIG 130 06/18/2019     Lab Results   Component Value Date    CHOLHDLRATIO 1.3 04/15/2015     Lab Results   Component Value Date    NHDL 44 11/22/2017       Is the patient on a Statin? NO             Is the patient on Aspirin? NO        Last three blood pressure readings:  BP Readings from Last 3 Encounters:   11/18/19 128/72   07/12/19 140/70   05/08/19 136/60       Date of last diabetes office visit: n/a     Tobacco History:     History   Smoking Status     Current Every Day Smoker     Packs/day: 0.50     Years: 25.00     Types: Cigarettes   Smokeless Tobacco     Never Used           Composite cancer screening  Chart review shows that this patient is due/due soon for the following Pap Smear and Mammogram  Summary:    Patient is due/failing the following:   A1C, MAMMOGRAM and PAP    Action needed:   Patient needs office visit for pap smear, mammogram, and diabetes follow up.    Type of outreach:    Sent [place] message.    Questions for provider review:    None                                                                                                                                    Jalen Hopkins MA     Chart routed to none.

## 2019-12-08 ENCOUNTER — HEALTH MAINTENANCE LETTER (OUTPATIENT)
Age: 64
End: 2019-12-08

## 2019-12-10 ENCOUNTER — TRANSFERRED RECORDS (OUTPATIENT)
Dept: HEALTH INFORMATION MANAGEMENT | Facility: CLINIC | Age: 64
End: 2019-12-10

## 2019-12-18 ENCOUNTER — TELEPHONE (OUTPATIENT)
Dept: FAMILY MEDICINE | Facility: CLINIC | Age: 64
End: 2019-12-18

## 2019-12-18 NOTE — TELEPHONE ENCOUNTER
Rachel Ross RN  Sn 3x over  60 days     Verbal OK for SN orders given per protocol    Shelli Live, [place]   [facility]

## 2019-12-18 NOTE — TELEPHONE ENCOUNTER
Reason for call:  Home Healthcare Reason for Call:  Home Health Care    Rachel with [facility] called regarding (reason for call): skilled nursing orders    Orders are needed for this patient.     PT:     OT:     Skilled Nursing: need frequency of orders    Pt Provider: Dr. West    Phone Number Homecare Nurse can be reached at: [phone]    Can we leave a detailed message on this number? YES    Phone number patient can be reached at:     Best Time:     Call taken on 12/18/2019 at 11:44 AM by Vanessa Rodriguez

## 2019-12-18 NOTE — TELEPHONE ENCOUNTER
Gave verbal orders to PT for home care. Ok for PT 2x a week for one week and 1x a week for 4 weeks.      Radha Moulton RN   [facility]

## 2019-12-19 ENCOUNTER — TELEPHONE (OUTPATIENT)
Dept: FAMILY MEDICINE | Facility: CLINIC | Age: 64
End: 2019-12-19

## 2019-12-19 NOTE — TELEPHONE ENCOUNTER
Routing to Provider.     Requests OT orders  for frequency is 1x/ week for two weeks. Two/ week for 1 week. 1/ week for two weeks. Begins 12/18/19.      Kristie Boland RN on 12/19/2019 at 10:24 AM

## 2019-12-19 NOTE — TELEPHONE ENCOUNTER
Reason for call:  Order   Order or referral being requested: need verbal orders for occupational therapy  Reason for request: orders needed  Date needed: at your convenience  Has the patient been seen by the PCP for this problem? YES    Additional comments: frequency is 1x/ week for two weeks. Two/ week for 1 week. 1/ week for two weeks. Begins 12/18/19.     Phone number to reach patient:  Other phone number:  [phone], VM is confidential    Best Time:  any    Can we leave a detailed message on this number?  YES

## 2019-12-20 DIAGNOSIS — Z53.9 DIAGNOSIS NOT YET DEFINED: Primary | ICD-10-CM

## 2019-12-20 PROCEDURE — G0180 MD CERTIFICATION HHA PATIENT: HCPCS | Performed by: FAMILY MEDICINE

## 2019-12-30 ENCOUNTER — MEDICAL CORRESPONDENCE (OUTPATIENT)
Dept: HEALTH INFORMATION MANAGEMENT | Facility: CLINIC | Age: 64
End: 2019-12-30

## 2020-01-06 ENCOUNTER — TRANSFERRED RECORDS (OUTPATIENT)
Dept: HEALTH INFORMATION MANAGEMENT | Facility: CLINIC | Age: 65
End: 2020-01-06

## 2020-01-16 ENCOUNTER — MEDICAL CORRESPONDENCE (OUTPATIENT)
Dept: HEALTH INFORMATION MANAGEMENT | Facility: CLINIC | Age: 65
End: 2020-01-16

## 2020-01-17 DIAGNOSIS — F41.8 ANXIETY ASSOCIATED WITH DEPRESSION: ICD-10-CM

## 2020-01-17 NOTE — TELEPHONE ENCOUNTER
"Requested Prescriptions   Pending Prescriptions Disp Refills     sertraline (ZOLOFT) 50 MG tablet 90 tablet 3     Sig: Take 1 tablet (50 mg) by mouth daily   Last Written Prescription Date:  7/12/2019  Last Fill Quantity: 90,  # refills: 3   Last office visit: 11/18/2019 with prescribing provider   Future Office Visit:        SSRIs Protocol Failed - 1/17/2020 12:03 PM        Failed - PHQ-9 score less than 5 in past 6 months     Please review last PHQ-9 score.      LAST PHQ-9 Score of 6 on 5/8/2017     Passed - Medication is active on med list        Passed - Patient is age 18 or older        Passed - No active pregnancy on record        Passed - No positive pregnancy test in last 12 months        Passed - Recent (6 mo) or future (30 days) visit within the authorizing provider's specialty     Patient had office visit in the last 6 months or has a visit in the next 30 days with authorizing provider or within the authorizing provider's specialty.  See \"Patient Info\" tab in inbasket, or \"Choose Columns\" in Meds & Orders section of the refill encounter.              "

## 2020-01-17 NOTE — TELEPHONE ENCOUNTER
Wrote patient on MyChart with PHQ-9 attached. Updated score needed for refill. Kristie Boland RN on 1/17/2020 at 12:15 PM

## 2020-01-18 DIAGNOSIS — F41.8 ANXIETY ASSOCIATED WITH DEPRESSION: ICD-10-CM

## 2020-01-20 NOTE — TELEPHONE ENCOUNTER
"Requested Prescriptions   Pending Prescriptions Disp Refills     sertraline (ZOLOFT) 50 MG tablet [Pharmacy Med Name: SERTRALINE 50MG TABLETS] 135 tablet      Sig: TAKE 1 AND 1/2 TABLETS(75 MG) BY MOUTH DAILY  Last Written Prescription Date:  07/12/2019  Last Fill Quantity: 90,  # refills: 3   Last office visit: 11/18/2019 with prescribing provider:  11/18/2019   Future Office Visit:         SSRIs Protocol Failed - 1/18/2020 11:33 AM        Failed - PHQ-9 score less than 5 in past 6 months     Please review last PHQ-9 score.           Passed - Medication is active on med list        Passed - Patient is age 18 or older        Passed - No active pregnancy on record        Passed - No positive pregnancy test in last 12 months        Passed - Recent (6 mo) or future (30 days) visit within the authorizing provider's specialty     Patient had office visit in the last 6 months or has a visit in the next 30 days with authorizing provider or within the authorizing provider's specialty.  See \"Patient Info\" tab in inbasket, or \"Choose Columns\" in Meds & Orders section of the refill encounter.            "

## 2020-01-20 NOTE — TELEPHONE ENCOUNTER
LAST PHQ-9 SCORE OF 6 on 5/8/2017.  Wrote patient on MyChart with PHQ-9 attached. Kristie Boland RN on 1/20/2020 at 10:32 AM

## 2020-01-21 NOTE — TELEPHONE ENCOUNTER
Updated PHQ-9 Score of 6 on  01/21/2020. Medication refills given.  Kristie Boland RN on 1/21/2020 at 3:22 PM

## 2020-02-03 ENCOUNTER — TRANSFERRED RECORDS (OUTPATIENT)
Dept: HEALTH INFORMATION MANAGEMENT | Facility: CLINIC | Age: 65
End: 2020-02-03

## 2020-02-05 DIAGNOSIS — F11.20 NARCOTIC DEPENDENCE (H): ICD-10-CM

## 2020-02-05 RX ORDER — BUPRENORPHINE HYDROCHLORIDE AND NALOXONE HYDROCHLORIDE DIHYDRATE 2; .5 MG/1; MG/1
TABLET SUBLINGUAL
Qty: 60 TABLET | Refills: 0 | Status: SHIPPED | OUTPATIENT
Start: 2020-02-05 | End: 2020-03-04

## 2020-02-05 NOTE — TELEPHONE ENCOUNTER
Controlled Substance Refill Request for buprenorphine-naloxone (SUBOXONE) 2-0.5 MG SUBL sublingual tablet   Problem List Complete:  No     PROVIDER TO CONSIDER COMPLETION OF PROBLEM LIST AND OVERVIEW/CONTROLLED SUBSTANCE AGREEMENT    Last Written Prescription Date:  01/03/2020  Last Fill Quantity: 60,   # refills: 0    THE MOST RECENT OFFICE VISIT MUST BE WITHIN THE PAST 3 MONTHS. AT LEAST ONE FACE TO FACE VISIT MUST OCCUR EVERY 6 MONTHS. ADDITIONAL VISITS CAN BE VIRTUAL.  (THIS STATEMENT SHOULD BE DELETED.)    Last Office Visit with [facility] primary care provider: 11/18/2019    Future Office visit:   Next 5 appointments (look out 90 days)    Mar 04, 2020  4:00 PM CST  Office Visit with Ishaan West MD  [facility] ([facility] [address] [place] [id number] [phone]          Controlled substance agreement:   Encounter-Level CSA:    There are no encounter-level csa.     Patient-Level CSA:    There are no patient-level csa.         Last Urine Drug Screen: No results found for: CDAUT, No results found for: COMDAT, No results found for: THC13, PCP13, COC13, MAMP13, OPI13, AMP13, BZO13, TCA13, MTD13, BAR13, OXY13, PPX13, BUP13     Processing:  Rx to be electronically transmitted to pharmacy by provider      [email or web address]       checked in past 3 months?  No, route to RN

## 2020-02-05 NOTE — TELEPHONE ENCOUNTER
[place]  reviewed 2/5/2020     Last refill: 1/6/20  Disp: 60    Concerns:  Oxycodone refills 12/3 & 12/5 by outside providers - patient was inpatient at this time due to fall resulting in fracture of femur, ETOH, and Right lateral Malleoli fracture - has not been seen for hospital f/u

## 2020-02-06 NOTE — TELEPHONE ENCOUNTER
Prescription for   buprenorphine-naloxone (SUBOXONE) 2-0.5 MG SUBL sublingual tablet  Faxed to [facility] at [phone]

## 2020-03-04 ENCOUNTER — OFFICE VISIT (OUTPATIENT)
Dept: FAMILY MEDICINE | Facility: CLINIC | Age: 65
End: 2020-03-04
Payer: COMMERCIAL

## 2020-03-04 VITALS
SYSTOLIC BLOOD PRESSURE: 158 MMHG | OXYGEN SATURATION: 98 % | DIASTOLIC BLOOD PRESSURE: 72 MMHG | HEART RATE: 78 BPM | BODY MASS INDEX: 21.36 KG/M2 | WEIGHT: 132.31 LBS | TEMPERATURE: 97.7 F

## 2020-03-04 DIAGNOSIS — Z79.899 ENCOUNTER FOR MONITORING SUBOXONE MAINTENANCE THERAPY: Primary | ICD-10-CM

## 2020-03-04 DIAGNOSIS — Z51.81 ENCOUNTER FOR MONITORING SUBOXONE MAINTENANCE THERAPY: Primary | ICD-10-CM

## 2020-03-04 DIAGNOSIS — F10.20 ALCOHOL DEPENDENCE, EPISODIC (H): ICD-10-CM

## 2020-03-04 PROCEDURE — 99214 OFFICE O/P EST MOD 30 MIN: CPT | Performed by: FAMILY MEDICINE

## 2020-03-04 RX ORDER — BUPRENORPHINE HYDROCHLORIDE AND NALOXONE HYDROCHLORIDE DIHYDRATE 2; .5 MG/1; MG/1
TABLET SUBLINGUAL
Qty: 60 TABLET | Refills: 0 | Status: SHIPPED | OUTPATIENT
Start: 2020-03-04 | End: 2020-04-17

## 2020-03-04 NOTE — PROGRESS NOTES
Subjective     Josette Palacios is a 65 year old female who presents to clinic today for the following health issues:    HPI     Medication Management:  Patient specifically requested a refill on her buprenorphine-naloxone 2-0.5 MG sublingual tablet. She takes it once a day in the morning and occasionally in the afternoon.    Incident Recovery Progress:  She has been recovering from various injuries sustained from a fall, including damaging the left lateral hip, thigh and knee. She underwent surgeries to repair the injuries, with a cory being inserted. Pain is worse in the morning.    Patient Active Problem List   Diagnosis     Nicotine dependence     Anxiety associated with depression     Pain in joint, lower leg     Ascites     Hypertension, benign essential, goal below 140/90     Alcohol dependence with other alcohol-induced disorder (H)     Low vitamin D level     Alcohol dependence, episodic (H)     Encounter for monitoring Suboxone maintenance therapy     Eczema, unspecified type     Chronic hepatitis C without hepatic coma (H)     History of herpes genitalis     Past Surgical History:   Procedure Laterality Date     breast implants       ESOPHAGOSCOPY, GASTROSCOPY, DUODENOSCOPY (EGD), COMBINED  1/24/2014    Procedure: COMBINED ESOPHAGOSCOPY, GASTROSCOPY, DUODENOSCOPY (EGD);;  Surgeon: Tony Moctezuma MD;  Location:  GI     ESOPHAGOSCOPY, GASTROSCOPY, DUODENOSCOPY (EGD), COMBINED N/A 9/13/2018    Procedure: COMBINED ESOPHAGOSCOPY, GASTROSCOPY, DUODENOSCOPY (EGD);  EGD/Colonoscopy ;  Surgeon: Saige Villalta MD;  Location:  GI     ORTHOPEDIC SURGERY      TKR left       Social History     Tobacco Use     Smoking status: Current Every Day Smoker     Packs/day: 0.50     Years: 25.00     Pack years: 12.50     Types: Cigarettes     Smokeless tobacco: Never Used   Substance Use Topics     Alcohol use: No     Alcohol/week: 0.0 standard drinks     Family History   Problem Relation Age of Onset      Cancer Father          Current Outpatient Medications   Medication Sig Dispense Refill     buprenorphine-naloxone (SUBOXONE) 2-0.5 MG SUBL sublingual tablet DISSOLVE 1 TABLET UNDER THE TONGUE TWICE DAILY 60 tablet 0     DULoxetine (CYMBALTA) 20 MG capsule Take 1 capsule (20 mg) by mouth 2 times daily 180 capsule 3     metoprolol succinate ER (TOPROL-XL) 25 MG 24 hr tablet TAKE 1 TABLET(25 MG) BY MOUTH AT BEDTIME 90 tablet 3     pramipexole (MIRAPEX) 0.125 MG tablet TAKE 1 TABLET(0.125 MG) BY MOUTH AT BEDTIME 90 tablet 3     sertraline (ZOLOFT) 50 MG tablet TAKE 1 AND 1/2 TABLETS(75 MG) BY MOUTH DAILY 90 tablet 3     traZODone (DESYREL) 50 MG tablet TAKE 1 TABLET(50 MG) BY MOUTH EVERY EVENING AS NEEDED FOR SLEEP 90 tablet 3     tretinoin (RETIN-A) 0.025 % cream Spread a pea size amount into affected area topically at bedtime.  Use sunscreen SPF>20. 60 g 3     valACYclovir (VALTREX) 500 MG tablet TAKE ONE TABLET BY MOUTH TWICE DAILY AS NEEDED 20 tablet 3     amoxicillin (AMOXIL) 500 MG capsule TAKE FOUR CAPSULES BY MOUTH 1 HOUR BEFORE DENTAL APPOINTMENT (Patient not taking: Reported on 11/18/2019) 4 capsule 0     blood glucose (ACCU-CHEK HCASE) test strip Use to test blood sugar 3 times daily or as directed. (Patient not taking: Reported on 11/18/2019) 100 each 5     estradiol (ESTRACE) 0.1 MG/GM vaginal cream Place 2 g vaginally twice a week (Patient not taking: Reported on 3/4/2020) 42.5 g 3     nicotine polacrilex (RA MINI NICOTINE) 4 MG lozenge Place 1 lozenge (4 mg) inside cheek as needed for smoking cessation (Patient not taking: Reported on 11/18/2019) 360 tablet 1     nitroFURantoin macrocrystal-monohydrate (MACROBID) 100 MG capsule Take 1 capsule (100 mg) by mouth 2 times daily (Patient not taking: Reported on 3/4/2020) 14 capsule 0     order for DME Equipment being ordered: Home blood pressure cuff (Patient not taking: Reported on 3/4/2020) 1 Device 0     sofosbuvir-velpatasvir (EPCLUSA) 400-100 MG per  tablet Take 1 tablet by mouth daily (Patient not taking: Reported on 3/4/2020) 28 tablet 2     No Known Allergies  BP Readings from Last 3 Encounters:   03/04/20 (!) 158/72   11/18/19 128/72   07/12/19 140/70    Wt Readings from Last 3 Encounters:   03/04/20 60 kg (132 lb 5 oz)   11/18/19 56.7 kg (125 lb)   07/12/19 54.2 kg (119 lb 6.4 oz)        Reviewed and updated as needed this visit by Provider       Review of Systems     Positive: hip pain, knee pain, leg pain    Denies headache, insomnia, chest pain, shortness of breath, cough, heartburn, bowel issues, bladder issues, neck pain, back pain, ankle pain, or foot pain. Remainder of ROS is negative unless otherwise noted above or in HPI.    This document serves as a record of the services and decisions personally performed and made by Ishaan West MD. It was created on his behalf by Galo Quinonez, trained medical scribe. The creation of this document is based on the provider's statements to the medical scribe.  Galo Quinonez 4:00 PM March 4, 2020      Objective    BP (!) 158/72   Pulse 78   Temp 97.7  F (36.5  C) (Oral)   Wt 60 kg (132 lb 5 oz)   LMP  (LMP Unknown)   SpO2 98%   Breastfeeding No   BMI 21.36 kg/m    Body mass index is 21.36 kg/m .  Physical Exam   GENERAL: healthy, alert and no distress  RESP: lungs clear to auscultation - no rales, rhonchi or wheezes  CV: regular rate and rhythm, normal S1 S2, no S3 or S4, no murmur, click or rub, no peripheral edema and peripheral pulses strong  MS: fairly good strength of hip and quad flexosr, sensitivity to poking over greater trochanter, sciatic notch nontender, no edema  SKIN: no suspicious lesions or rashes  NEURO: Normal strength and tone, mentation intact and speech normal  PSYCH: mentation appears normal, affect normal/bright    Diagnostic Test Results:  Labs reviewed in Epic  none       Assessment & Plan   (F11.20) Narcotic dependence (H)  Comment: Patient expresses interest in tapering off of Suboxone.    Plan: buprenorphine-naloxone (SUBOXONE) 2-0.5 MG SUBL        sublingual tablet        Continue taking medication. Follow up as needed.    Patient Instructions   Continue taking Suboxone 2-0.5 MG sublingual tablet. Return in two months. Follow up as needed.    The information in this document, created by the medical scribe for me, accurately reflects the services I personally performed and the decisions made by me. I have reviewed and approved this document for accuracy prior to leaving the patient care area.  March 4, 2020 4:00 PM    Ishaan West MD  [facility]

## 2020-03-06 ENCOUNTER — MYC MEDICAL ADVICE (OUTPATIENT)
Dept: FAMILY MEDICINE | Facility: CLINIC | Age: 65
End: 2020-03-06

## 2020-03-06 NOTE — TELEPHONE ENCOUNTER
Panel Management Review      Patient has the following on her problem list: None      Composite cancer screening  Chart review shows that this patient is due/due soon for the following Mammogram  Summary:    Patient is due/failing the following:   Eye Exam, MAMMOGRAM and PHYSICAL    Action needed:   Patient needs office visit for Physical and Eye Exam. and Patient needs referral/order for Mammogram.    Type of outreach:    Sent [employer] message.    Questions for provider review:    None                                                                                                                                    Rashida Velazquez MA

## 2020-03-15 ENCOUNTER — HEALTH MAINTENANCE LETTER (OUTPATIENT)
Age: 65
End: 2020-03-15

## 2020-03-20 DIAGNOSIS — I10 HYPERTENSION, BENIGN ESSENTIAL, GOAL BELOW 140/90: ICD-10-CM

## 2020-03-23 RX ORDER — METOPROLOL SUCCINATE 25 MG/1
TABLET, EXTENDED RELEASE ORAL
Qty: 90 TABLET | Refills: 3 | Status: SHIPPED | OUTPATIENT
Start: 2020-03-23 | End: 2021-03-23

## 2020-04-17 DIAGNOSIS — Z79.899 ENCOUNTER FOR MONITORING SUBOXONE MAINTENANCE THERAPY: Primary | ICD-10-CM

## 2020-04-17 DIAGNOSIS — G25.81 RESTLESS LEGS SYNDROME (RLS): ICD-10-CM

## 2020-04-17 DIAGNOSIS — Z51.81 ENCOUNTER FOR MONITORING SUBOXONE MAINTENANCE THERAPY: Primary | ICD-10-CM

## 2020-04-17 RX ORDER — PRAMIPEXOLE DIHYDROCHLORIDE 0.12 MG/1
TABLET ORAL
Qty: 90 TABLET | Refills: 3 | Status: CANCELLED | OUTPATIENT
Start: 2020-04-17

## 2020-04-17 RX ORDER — PRAMIPEXOLE DIHYDROCHLORIDE 0.25 MG/1
0.25 TABLET ORAL AT BEDTIME
Qty: 90 TABLET | Refills: 0 | Status: SHIPPED | OUTPATIENT
Start: 2020-04-17 | End: 2020-12-16

## 2020-04-17 RX ORDER — PRAMIPEXOLE DIHYDROCHLORIDE 0.25 MG/1
0.25 TABLET ORAL 3 TIMES DAILY
Qty: 90 TABLET | Refills: 0 | Status: SHIPPED | OUTPATIENT
Start: 2020-04-17 | End: 2020-04-17

## 2020-04-17 RX ORDER — BUPRENORPHINE HYDROCHLORIDE AND NALOXONE HYDROCHLORIDE DIHYDRATE 2; .5 MG/1; MG/1
TABLET SUBLINGUAL
Qty: 60 TABLET | Refills: 0 | Status: SHIPPED | OUTPATIENT
Start: 2020-04-17 | End: 2020-05-21

## 2020-04-17 NOTE — TELEPHONE ENCOUNTER
Huddle with Dr West after notifying pt of dose change she requested it be for hs and not 3xday, Dr West gave verbal ok to make the change and resend script for HS with 90, no refill    Shelli Live RN   [facility]

## 2020-04-17 NOTE — TELEPHONE ENCOUNTER
Requested Prescriptions   Pending Prescriptions Disp Refills     buprenorphine-naloxone (SUBOXONE) 2-0.5 MG SUBL sublingual tablet 60 tablet 0     Sig: DISSOLVE 1 TABLET UNDER THE TONGUE TWICE DAILY   Last Written Prescription Date:  3/4/2020  Last Fill Quantity: 60,  # refills: 0   Last office visit: 3/4/2020 with prescribing provider:     Future Office Visit:        There is no refill protocol information for this order        pramipexole (MIRAPEX) 0.125 MG tablet 90 tablet 3     Sig: TAKE 1 TABLET(0.125 MG) BY MOUTH AT BEDTIME   Last Written Prescription Date:  11/18/19  Last Fill Quantity: 90,  # refills: 3   Last office visit: 3/4/2020 with prescribing provider:     Future Office Visit:        Antiparkinson's Agents Protocol Failed - 4/17/2020 10:22 AM        Failed - Blood pressure under 140/90 in past 12 months     BP Readings from Last 3 Encounters:   03/04/20 (!) 158/72   11/18/19 128/72   07/12/19 140/70                 Failed - CBC on record in past 12 months     Recent Labs   Lab Test 07/12/19  1449  07/02/18  1104   WBC  --   --  7.0   RBC  --   --  3.25*   HGB 9.4*   < > 9.2*   HCT  --   --  29.0*   PLT  --   --  168    < > = values in this interval not displayed.                 Passed - ALT on record in past 12 months         Recent Labs   Lab Test 07/12/19  1449   ALT 29             Passed - Serum Creatinine on file in past 12 months     Recent Labs   Lab Test 07/12/19  1449  03/10/14  1148  06/15/12  1045   CR 0.85   < >  --    < >  --    CREAT  --   --   --   --  0.6   CRPOC  --   --  0.4*  --   --     < > = values in this interval not displayed.       Ok to refill medication if creatinine is low          Passed - Medication is active on med list        Passed - Patient is age 18 or older        Passed - No active pregnancy on record        Passed - No positive pregnancy test in the past 12 months        Passed - Recent (6 mo) or future (30 days) visit within the authorizing provider's specialty  "    Patient had office visit in the last 6 months or has a visit in the next 30 days with authorizing provider or within the authorizing provider's specialty.  See \"Patient Info\" tab in inbasket, or \"Choose Columns\" in Meds & Orders section of the refill encounter.               "

## 2020-04-17 NOTE — TELEPHONE ENCOUNTER
Dr West    She is using more tabs at HS of the mirapex and cant get a refill because it is too soon    pramipexole cued for the increased dose if you agree    Routing refill request to provider for review/approval because:  Drug not on the [facility] refill protocol      check done last fill 3/4/2020 for 60, Dr Jenna Live, RN   [facility]

## 2020-04-17 NOTE — TELEPHONE ENCOUNTER
Patient called back again and also states she would like pramipexole (MIRAPEX) 0.125 MG tablet refilled as well. She wants to know if Dr. West can give her 60 tablets or up the dosage instead as pharmacy won't let her refill it yet as she has to wait exactly 30 days and patient states some night she eats this medication two times.  Patient only has three pills left and pharmacy won't fill it.   Ok to leave detailed message.

## 2020-04-17 NOTE — TELEPHONE ENCOUNTER
Pharmacy Queued    Patient would like buprenorphine-naloxone (SUBOXONE) 2-0.5 MG SUBL sublingual tablet refilled.     Patient has 2 or 3 days left of medications.     Patient can be reached at [phone].   Per patient okay to leave detailed message.

## 2020-04-19 DIAGNOSIS — Z79.899 ENCOUNTER FOR MONITORING SUBOXONE MAINTENANCE THERAPY: ICD-10-CM

## 2020-04-19 DIAGNOSIS — Z51.81 ENCOUNTER FOR MONITORING SUBOXONE MAINTENANCE THERAPY: ICD-10-CM

## 2020-04-20 ENCOUNTER — TELEPHONE (OUTPATIENT)
Dept: FAMILY MEDICINE | Facility: CLINIC | Age: 65
End: 2020-04-20

## 2020-04-20 NOTE — TELEPHONE ENCOUNTER
Reason for call:  Other   Patient called regarding (reason for call): call back  Additional comments: Received a call from Charles at [facility], he sated the dosage is not is clear for medication   pramipexole (MIRAPEX) 0.25 MG tablet  90 tablet     He would like a call back.     Phone number to reach patient:  Other phone number:  [phone]    Best Time:  na    Can we leave a detailed message on this number?  YES    Travel screening: Not Applicable

## 2020-04-21 RX ORDER — BUPRENORPHINE HYDROCHLORIDE AND NALOXONE HYDROCHLORIDE DIHYDRATE 2; .5 MG/1; MG/1
TABLET SUBLINGUAL
Qty: 60 TABLET | OUTPATIENT
Start: 2020-04-21

## 2020-04-21 NOTE — TELEPHONE ENCOUNTER
Call to pharmacy, did get script on 4/17/20.   Closing enc.     Chani Nichols RN   [facility]

## 2020-04-22 NOTE — TELEPHONE ENCOUNTER
Spoke with Pt to clarify how she is taking this medications.   Pt reports she had been taking 0.125mg, but was not working. Pt asked PCP if she could take 2 of current dose. She takes 1 of the 0.125mg and if she wakes up takes another. With the current presciption was running out too fast.   Dr. West sent new script with increased dose. Pt to take one nightly and see how it works.   Pharmacy informed that the 4/17/20 script 0.25mg #90 no refills is correct.   11/18/19 0.125mg removed from profile.     Chani Nichols RN   [facility]

## 2020-05-21 DIAGNOSIS — Z51.81 ENCOUNTER FOR MONITORING SUBOXONE MAINTENANCE THERAPY: ICD-10-CM

## 2020-05-21 DIAGNOSIS — Z79.899 ENCOUNTER FOR MONITORING SUBOXONE MAINTENANCE THERAPY: ICD-10-CM

## 2020-05-21 NOTE — TELEPHONE ENCOUNTER
Requested Prescriptions   Pending Prescriptions Disp Refills     buprenorphine-naloxone (SUBOXONE) 2-0.5 MG SUBL sublingual tablet 60 tablet 0     Sig: DISSOLVE 1 TABLET UNDER THE TONGUE TWICE DAILY       There is no refill protocol information for this order           Last Written Prescription Date:  4/17/20  Last Fill Quantity: 60,   # refills: 0  Last Office Visit: 3/4/20  Future Office visit:       Routing refill request to provider for review/approval because:  Drug not on the G, P or  Health refill protocol or controlled substance   checked, last fill 4/17/20 for 60#  Radha Moulton RN   [facility]

## 2020-05-22 RX ORDER — BUPRENORPHINE HYDROCHLORIDE AND NALOXONE HYDROCHLORIDE DIHYDRATE 2; .5 MG/1; MG/1
TABLET SUBLINGUAL
Qty: 60 TABLET | Refills: 0 | Status: SHIPPED | OUTPATIENT
Start: 2020-05-22 | End: 2020-05-29

## 2020-05-29 ENCOUNTER — TELEPHONE (OUTPATIENT)
Dept: FAMILY MEDICINE | Facility: CLINIC | Age: 65
End: 2020-05-29

## 2020-05-29 DIAGNOSIS — Z79.899 ENCOUNTER FOR MONITORING SUBOXONE MAINTENANCE THERAPY: ICD-10-CM

## 2020-05-29 DIAGNOSIS — Z51.81 ENCOUNTER FOR MONITORING SUBOXONE MAINTENANCE THERAPY: ICD-10-CM

## 2020-05-29 RX ORDER — BUPRENORPHINE HYDROCHLORIDE AND NALOXONE HYDROCHLORIDE DIHYDRATE 2; .5 MG/1; MG/1
TABLET SUBLINGUAL
Qty: 60 TABLET | Refills: 0 | Status: SHIPPED | OUTPATIENT
Start: 2020-05-29 | End: 2020-06-29

## 2020-05-29 NOTE — TELEPHONE ENCOUNTER
Spoke with patient and let her know Rx for suboxone was sent to pharmacy requested.    Radha Moulton RN   [facility]

## 2020-05-29 NOTE — TELEPHONE ENCOUNTER
Dr. West,    Please see note below.    Med and pharm cued.   checked: last written and picked up 4/17/20, so patient never picked up Rx for suboxone written 5/22/20.    Thanks  Radha Moulton RN   [facility]

## 2020-05-29 NOTE — TELEPHONE ENCOUNTER
Pt's pharmacy is closed. Would need a new script sent to the queued pharmacy. She only has one day left of buprenorphine-naloxone (SUBOXONE) 2-0.5 MG SUBL sublingual tablet [[id number]] (Order [id number])

## 2020-06-04 ENCOUNTER — TELEPHONE (OUTPATIENT)
Dept: FAMILY MEDICINE | Facility: CLINIC | Age: 65
End: 2020-06-04

## 2020-06-04 NOTE — TELEPHONE ENCOUNTER
Per [facility] pharmacy: Iz patient supposed to be on both duloxetine and zoloft? Not an ideal combination with limited benefits

## 2020-06-05 ENCOUNTER — TELEPHONE (OUTPATIENT)
Dept: FAMILY MEDICINE | Facility: CLINIC | Age: 65
End: 2020-06-05

## 2020-06-05 NOTE — TELEPHONE ENCOUNTER
Left messasge with pt. Calling to clarify how she is taking her meds and if they are working well for her. Based on refills, she may not be taking as directed.     [facility] calling from pharmacy inquiring whether Pt should be on both Sertraline and Duloxetine.   Pt has been filling the sertraline less often than expected so is not sure she is taking as directed.   Both medications are on her med list.     Sertraline fills: 1/27 45 tabs 3/20 45 tabs  Duloxetine fills: 1/8 2/14 3/20 5/8    Chani Nichols RN   [facility]

## 2020-06-05 NOTE — TELEPHONE ENCOUNTER
The pharmacy is calling with a question regarding a medication ordered by Ishaan West MD.   The patient is Not Waiting.

## 2020-06-08 NOTE — TELEPHONE ENCOUNTER
Agree- triage please contact patient regarding which medication has been most helpful; duloxetine or sertraline? Will continue one and discontinue the other. Thanks Ishaan

## 2020-06-15 NOTE — TELEPHONE ENCOUNTER
CAITLIN Haider  I spoke with patient and her . She is on duloxetine and sertraline together. Most recent office notes regarding anxiety medications was 7/12/19, here are the plan notes for visit:     Assessment & Plan            Encounter Diagnoses   Name Primary?     Narcotic dependence (H) Yes     Hyperglycemia       Anxiety associated with depression       Restless legs syndrome (RLS)       Anemia in other chronic diseases classified elsewhere       Chronic hepatitis C without hepatic coma (H)       Smoker          (F11.20) Narcotic dependence (H)  (primary encounter diagnosis)  Comment: Stable. Controlled by current medication.  Plan: buPROPion (ZYBAN) 150 MG 12 hr tablet        Continue taking medication. Follow up as needed.     (R73.9) Hyperglycemia  Comment: Patient will check her blood sugar at home.  Plan: blood glucose (ACCU-CHEK CHASE) test strip        Follow up as needed.     (F41.8) Anxiety associated with depression  Comment: Stable. Controlled by current medication.  Plan: sertraline (ZOLOFT) 50 MG tablet        Continue taking medication. Follow up as needed.     (G25.81) Restless legs syndrome (RLS)  Comment: Stable. Controlled by current medication.  Plan: pramipexole (MIRAPEX) 0.125 MG tablet        Continue taking medication. Follow up as needed.    No notes about taking duloxetine, but when I spoke with patient she is taking that as well. I scheduled a phone visit with you for Friday to discuss medications    Thanks  Radha Moulton RN   [facility]

## 2020-06-18 NOTE — PROGRESS NOTES
"Josette Palacios is a 65 year old female who is being evaluated via a billable telephone visit.      The patient has been notified of following:     \"This telephone visit will be conducted via a call between you and your physician/provider. We have found that certain health care needs can be provided without the need for a physical exam.  This service lets us provide the care you need with a short phone conversation.  If a prescription is necessary we can send it directly to your pharmacy.  If lab work is needed we can place an order for that and you can then stop by our lab to have the test done at a later time.    Telephone visits are billed at different rates depending on your insurance coverage. During this emergency period, for some insurers they may be billed the same as an in-person visit.  Please reach out to your insurance provider with any questions.    If during the course of the call the physician/provider feels a telephone visit is not appropriate, you will not be charged for this service.\"    Patient has given verbal consent for Telephone visit?  Yes    What phone number would you like to be contacted at? [phone]  Ana Gonzalez will be joining the phone call.     How would you like to obtain your AVS? Eric Cash     Josette Palacios is a 65 year old female who presents via phone visit today for the following health issues:    HPI  Anxiety Follow-Up    How are you doing with your anxiety since your last visit? No change    Are you having other symptoms that might be associated with anxiety? Yes:  nervousness.     Have you had a significant life event? No     Are you feeling depressed? Yes:  once and a while.     Do you have any concerns with your use of alcohol or other drugs? No    Social History     Tobacco Use     Smoking status: Current Every Day Smoker     Packs/day: 0.50     Years: 25.00     Pack years: 12.50     Types: Cigarettes     Smokeless tobacco: Never Used   Substance Use " Topics     Alcohol use: No     Alcohol/week: 0.0 standard drinks     Drug use: No     GABRIELLE-7 SCORE 6/19/2020   Total Score 7     PHQ 3/2/2016 5/8/2017 1/21/2020   PHQ-9 Total Score 9 6 6   Q9: Thoughts of better off dead/self-harm past 2 weeks Not at all Not at all Not at all     Last PHQ-9 1/21/2020   1.  Little interest or pleasure in doing things 2   2.  Feeling down, depressed, or hopeless 1   3.  Trouble falling or staying asleep, or sleeping too much 1   4.  Feeling tired or having little energy 1   5.  Poor appetite or overeating 0   6.  Feeling bad about yourself 0   7.  Trouble concentrating 1   8.  Moving slowly or restless 0   Q9: Thoughts of better off dead/self-harm past 2 weeks 0   PHQ-9 Total Score 6   Difficulty at work, home, or with people -     GABRIELLE-7  6/19/2020   1. Feeling nervous, anxious, or on edge 1   2. Not being able to stop or control worrying 3   3. Worrying too much about different things 0   4. Trouble relaxing 2   5. Being so restless that it is hard to sit still 0   6. Becoming easily annoyed or irritable 1   7. Feeling afraid, as if something awful might happen 0   GABRIELLE-7 Total Score 7         How many servings of fruits and vegetables do you eat daily?  1-2    On average, how many sweetened beverages do you drink each day (Examples: soda, juice, sweet tea, etc.  Do NOT count diet or artificially sweetened beverages)?   4    How many days per week do you exercise enough to make your heart beat faster? 3 or less    How many minutes a day do you exercise enough to make your heart beat faster? 40-60 minutes gardening.     How many days per week do you miss taking your medication? 0      I have reviewed and updated the patient's Past Medical History, Social History, Family History and Medication List    Reviewed and updated as needed this visit by Provider  Problems         Review of Systems   Constitutional, HEENT, cardiovascular, pulmonary, gi and gu systems are negative, except as  otherwise noted.       Objective   Reported vitals:  LMP  (LMP Unknown)    healthy, alert and no distress  PSYCH: Alert and oriented times 3; coherent speech, normal   rate and volume, able to articulate logical thoughts, able   to abstract reason, no tangential thoughts, no hallucinations   or delusions  Her affect is pleasant  RESP: No cough, no audible wheezing, able to talk in full sentences  Remainder of exam unable to be completed due to telephone visits    Diagnostic Test Results:  Labs reviewed in Epic        Assessment/Plan:  1. Anxiety associated with depression  At goal   - sertraline (ZOLOFT) 50 MG tablet; Take 1 tablet (50 mg) by mouth daily  Dispense: 90 tablet; Refill: 3    Patient Instructions   Taper duloxetine 20 mg daily x 1 month, then every other day x 1 month then stop.  In person appointment if worse light headedness. Consider cutting metoprolol in half.     Phone call duration:  19 minutes    Ishaan West MD

## 2020-06-19 ENCOUNTER — VIRTUAL VISIT (OUTPATIENT)
Dept: FAMILY MEDICINE | Facility: CLINIC | Age: 65
End: 2020-06-19
Payer: COMMERCIAL

## 2020-06-19 DIAGNOSIS — F41.8 ANXIETY ASSOCIATED WITH DEPRESSION: Primary | ICD-10-CM

## 2020-06-19 PROCEDURE — 99213 OFFICE O/P EST LOW 20 MIN: CPT | Mod: TEL | Performed by: FAMILY MEDICINE

## 2020-06-19 RX ORDER — DULOXETIN HYDROCHLORIDE 20 MG/1
20 CAPSULE, DELAYED RELEASE ORAL DAILY
Qty: 60 CAPSULE | Refills: 1 | COMMUNITY
Start: 2020-06-19 | End: 2021-02-19

## 2020-06-19 ASSESSMENT — ANXIETY QUESTIONNAIRES
7. FEELING AFRAID AS IF SOMETHING AWFUL MIGHT HAPPEN: NOT AT ALL
GAD7 TOTAL SCORE: 7
3. WORRYING TOO MUCH ABOUT DIFFERENT THINGS: NOT AT ALL
5. BEING SO RESTLESS THAT IT IS HARD TO SIT STILL: NOT AT ALL
6. BECOMING EASILY ANNOYED OR IRRITABLE: SEVERAL DAYS
1. FEELING NERVOUS, ANXIOUS, OR ON EDGE: SEVERAL DAYS
2. NOT BEING ABLE TO STOP OR CONTROL WORRYING: NEARLY EVERY DAY

## 2020-06-19 ASSESSMENT — PATIENT HEALTH QUESTIONNAIRE - PHQ9: 5. POOR APPETITE OR OVEREATING: MORE THAN HALF THE DAYS

## 2020-06-19 NOTE — PATIENT INSTRUCTIONS
Taper duloxetine 20 mg daily x 1 month, then every other day x 1 month then stop.  In person appointment if worse light headedness. Consider cutting metoprolol in half.

## 2020-06-20 ASSESSMENT — ANXIETY QUESTIONNAIRES: GAD7 TOTAL SCORE: 7

## 2020-06-29 DIAGNOSIS — Z79.899 ENCOUNTER FOR MONITORING SUBOXONE MAINTENANCE THERAPY: ICD-10-CM

## 2020-06-29 DIAGNOSIS — Z51.81 ENCOUNTER FOR MONITORING SUBOXONE MAINTENANCE THERAPY: ICD-10-CM

## 2020-06-29 RX ORDER — BUPRENORPHINE HYDROCHLORIDE AND NALOXONE HYDROCHLORIDE DIHYDRATE 2; .5 MG/1; MG/1
TABLET SUBLINGUAL
Qty: 60 TABLET | Refills: 0 | Status: SHIPPED | OUTPATIENT
Start: 2020-06-29 | End: 2020-09-13

## 2020-06-29 NOTE — TELEPHONE ENCOUNTER
Dr. West,     Pt will not have insurance after tomorrow and was hoping to  her suboxone prescription before her insurance expires. Pharmacy queued and pt is almost out of medication    Requested Prescriptions   Pending Prescriptions Disp Refills     buprenorphine-naloxone (SUBOXONE) 2-0.5 MG SUBL sublingual tablet 60 tablet 0     Sig: DISSOLVE 1 TABLET UNDER THE TONGUE TWICE DAILY       There is no refill protocol information for this order        Last Written Prescription Date: 5/29/20  Last Fill Quantity: 60,  # refills: 0   Last office visit: 3/4/2020 with prescribing provider: CURT West  Future Office Visit:        05/30/2020 05/29/2020  Buprenorphn-Naloxn 2-0.5 Mg Sl  60.00 30 Ke Kyle    04/17/2020 04/17/2020  Buprenorphn-Naloxn 2-0.5 Mg Sl  60.00 30 Ke Kyle    03/04/2020 03/04/2020  Buprenorphn-Naloxn 2-0.5 Mg Sl  60.00 30 Kaleb Wright    02/06/2020 02/05/2020  Buprenorphn-Naloxn 2-0.5 Mg Sl  60.00 30 Ke Kyle    01/06/2020 01/03/2020  Buprenorphn-Naloxn 2-0.5 Mg Sl  60.00 30 Kaleb Kyle      Routing refill request to provider for review/approval because:  Drug not on the [facility] refill protocol     Chani Nichols RN   [facility]

## 2020-06-29 NOTE — TELEPHONE ENCOUNTER
Pt will not have insurance after tomorrow and was hoping to  her suboxone prescription before her insurance expires. Pharmacy queued and pt is almost out of medication

## 2020-09-11 DIAGNOSIS — Z79.899 ENCOUNTER FOR MONITORING SUBOXONE MAINTENANCE THERAPY: ICD-10-CM

## 2020-09-11 DIAGNOSIS — Z51.81 ENCOUNTER FOR MONITORING SUBOXONE MAINTENANCE THERAPY: ICD-10-CM

## 2020-09-13 RX ORDER — BUPRENORPHINE HYDROCHLORIDE AND NALOXONE HYDROCHLORIDE DIHYDRATE 2; .5 MG/1; MG/1
TABLET SUBLINGUAL
Qty: 60 TABLET | Refills: 0 | Status: SHIPPED | OUTPATIENT
Start: 2020-09-13 | End: 2020-09-25

## 2020-09-13 NOTE — TELEPHONE ENCOUNTER
Requested Prescriptions   Pending Prescriptions Disp Refills     buprenorphine-naloxone (SUBOXONE) 2-0.5 MG SUBL sublingual tablet [Pharmacy Med Name: BUPRENORPHINE/NALOX 2MG/0.5MG SL TB] 60 tablet      Sig: DISSOLVE 1 TABLET UNDER THE TONGUE TWICE DAILY       There is no refill protocol information for this order        Routing refill request to provider for review/approval because:  Drug not on the [facility] refill protocol     [place]  reviewed 9/12/2020  Last refill: 8/31/20  Disp: 60  Concerns: none

## 2020-09-24 DIAGNOSIS — Z51.81 ENCOUNTER FOR MONITORING SUBOXONE MAINTENANCE THERAPY: ICD-10-CM

## 2020-09-24 DIAGNOSIS — Z79.899 ENCOUNTER FOR MONITORING SUBOXONE MAINTENANCE THERAPY: ICD-10-CM

## 2020-09-25 RX ORDER — BUPRENORPHINE HYDROCHLORIDE AND NALOXONE HYDROCHLORIDE DIHYDRATE 2; .5 MG/1; MG/1
TABLET SUBLINGUAL
Qty: 60 TABLET | Refills: 0 | Status: SHIPPED | OUTPATIENT
Start: 2020-09-25 | End: 2020-11-09

## 2020-09-25 NOTE — TELEPHONE ENCOUNTER
Script for  buprenorphine-naloxone (SUBOXONE) 2-0.5 MG SUBL sublingual tablet faxed to [facility] in [facility] at [phone]

## 2020-09-25 NOTE — TELEPHONE ENCOUNTER
Routing refill request to provider for review/approval because:  Drug not on the [facility] refill protocol      check done last fill 8/31/2020 for 60, Dr West    Last office visit: 3/4/2020 with prescribing provider:  Pt had virtual visit on 6/19/2020  Future Office Visit:

## 2020-11-06 DIAGNOSIS — Z79.899 ENCOUNTER FOR MONITORING SUBOXONE MAINTENANCE THERAPY: ICD-10-CM

## 2020-11-06 DIAGNOSIS — Z51.81 ENCOUNTER FOR MONITORING SUBOXONE MAINTENANCE THERAPY: ICD-10-CM

## 2020-11-06 DIAGNOSIS — G47.00 INSOMNIA, UNSPECIFIED TYPE: ICD-10-CM

## 2020-11-08 DIAGNOSIS — Z79.899 ENCOUNTER FOR MONITORING SUBOXONE MAINTENANCE THERAPY: ICD-10-CM

## 2020-11-08 DIAGNOSIS — Z51.81 ENCOUNTER FOR MONITORING SUBOXONE MAINTENANCE THERAPY: ICD-10-CM

## 2020-11-09 RX ORDER — TRAZODONE HYDROCHLORIDE 50 MG/1
TABLET, FILM COATED ORAL
Qty: 90 TABLET | Refills: 1 | Status: SHIPPED | OUTPATIENT
Start: 2020-11-09 | End: 2022-01-10

## 2020-11-09 RX ORDER — BUPRENORPHINE HYDROCHLORIDE AND NALOXONE HYDROCHLORIDE DIHYDRATE 2; .5 MG/1; MG/1
TABLET SUBLINGUAL
Qty: 60 TABLET | Refills: 0 | OUTPATIENT
Start: 2020-11-09

## 2020-11-09 RX ORDER — BUPRENORPHINE HYDROCHLORIDE AND NALOXONE HYDROCHLORIDE DIHYDRATE 2; .5 MG/1; MG/1
TABLET SUBLINGUAL
Qty: 60 TABLET | Refills: 0 | Status: SHIPPED | OUTPATIENT
Start: 2020-11-09 | End: 2020-11-09

## 2020-11-09 RX ORDER — BUPRENORPHINE HYDROCHLORIDE AND NALOXONE HYDROCHLORIDE DIHYDRATE 2; .5 MG/1; MG/1
TABLET SUBLINGUAL
Qty: 60 TABLET | Refills: 0 | Status: SHIPPED | OUTPATIENT
Start: 2020-11-09 | End: 2020-12-18

## 2020-11-09 NOTE — TELEPHONE ENCOUNTER
Requested Prescriptions   Pending Prescriptions Disp Refills     buprenorphine-naloxone (SUBOXONE) 2-0.5 MG SUBL sublingual tablet [Pharmacy Med Name: BUPRENORPHINE/NALOX 2MG/0.5MG SL TB] 60 tablet      Sig: DISSOLVE 1 TABLET UNDER THE TONGUE TWICE DAILY       There is no refill protocol information for this order        Routing refill request to provider for review/approval because:  Drug not on the [facility] refill protocol   [place]  reviewed 11/9/2020  Last refill: 10/8/20  Disp: 60  Concerns: none

## 2020-11-09 NOTE — TELEPHONE ENCOUNTER
"Requested Prescriptions   Pending Prescriptions Disp Refills     buprenorphine-naloxone (SUBOXONE) 2-0.5 MG SUBL sublingual tablet 60 tablet 0       There is no refill protocol information for this order        traZODone (DESYREL) 50 MG tablet 90 tablet 3     Sig: TAKE 1 TABLET(50 MG) BY MOUTH EVERY EVENING AS NEEDED FOR SLEEP       Serotonin Modulators Passed - 11/6/2020 12:11 PM        Passed - Recent (12 mo) or future (30 days) visit within the authorizing provider's specialty     Patient has had an office visit with the authorizing provider or a provider within the authorizing providers department within the previous 12 mos or has a future within next 30 days. See \"Patient Info\" tab in inbasket, or \"Choose Columns\" in Meds & Orders section of the refill encounter.              Passed - Medication is active on med list        Passed - Patient is age 18 or older        Passed - No active pregnancy on record        Passed - No positive pregnancy test in past 12 months           Routing refill request to provider for review/approval because:  duplicate request on suboxone        "

## 2020-11-16 ENCOUNTER — TELEPHONE (OUTPATIENT)
Dept: FAMILY MEDICINE | Facility: CLINIC | Age: 65
End: 2020-11-16

## 2020-11-16 DIAGNOSIS — S82.202K CLOSED FRACTURE OF SHAFT OF LEFT TIBIA WITH NONUNION, UNSPECIFIED FRACTURE MORPHOLOGY, SUBSEQUENT ENCOUNTER: ICD-10-CM

## 2020-11-16 RX ORDER — AMOXICILLIN 500 MG/1
CAPSULE ORAL
Qty: 4 CAPSULE | Refills: 3 | Status: SHIPPED | OUTPATIENT
Start: 2020-11-16 | End: 2021-03-03

## 2020-11-16 NOTE — TELEPHONE ENCOUNTER
Reason for call:  Other   Patient called regarding (reason for call): call back  Additional comments: pt has a dentist appointment on Wednesday 11/18 and needs a dose of amoxicillin to take before that appointment as she has had a knee surgery in the past. Pharmacy attached. She needs to pick it up tomorrow at her appointment is at 8:50 Am on Wednesday, so she wouldn't have enough time to  the medication and take the medication before the appointment    Phone number to reach patient:  Home number on file [phone] (home)    Best Time:  n/a    Can we leave a detailed message on this number?  YES    Travel screening: Not Applicable

## 2020-11-16 NOTE — TELEPHONE ENCOUNTER
Left message updating patient on rx approved    Signed Prescriptions:                        Disp   Refills    amoxicillin (AMOXIL) 500 MG capsule        4 caps*3        Sig: TAKE FOUR CAPSULES BY MOUTH 1 HOUR BEFORE DENTAL           APPOINTMENT  Authorizing Provider: PAULINA DICKINSON

## 2021-01-25 DIAGNOSIS — Z79.899 ENCOUNTER FOR MONITORING SUBOXONE MAINTENANCE THERAPY: ICD-10-CM

## 2021-01-25 DIAGNOSIS — Z51.81 ENCOUNTER FOR MONITORING SUBOXONE MAINTENANCE THERAPY: ICD-10-CM

## 2021-01-25 RX ORDER — BUPRENORPHINE HYDROCHLORIDE AND NALOXONE HYDROCHLORIDE DIHYDRATE 2; .5 MG/1; MG/1
TABLET SUBLINGUAL
Qty: 60 TABLET | Refills: 0 | Status: CANCELLED | OUTPATIENT
Start: 2021-01-25

## 2021-01-25 NOTE — TELEPHONE ENCOUNTER
Medication Question or Refill    Who is calling: patient    What medication are you calling about (include dose and sig)?: buprenorphine-naloxone (SUBOXONE) 2-0.5 MG SUBL sublingual tablet    Who prescribed the medication?: buprenorphine-naloxone (SUBOXONE) 2-0.5 MG SUBL sublingual tablet    Do you need a refill? Yes:     When did you use the medication last? n/a    Patient offered an appointment? No    Do you have any questions or concerns?  No    Requested Pharmacy: [facility] to leave a detailed message?: Yes at Cell number on file:    Telephone Information:   Mobile [phone]

## 2021-02-09 DIAGNOSIS — G25.81 RESTLESS LEGS SYNDROME (RLS): ICD-10-CM

## 2021-02-09 LAB
ALBUMIN SERPL-MCNC: 3.8 G/DL (ref 3.4–5)
ALP SERPL-CCNC: 117 U/L (ref 40–150)
ALT SERPL W P-5'-P-CCNC: 47 U/L (ref 0–50)
ANION GAP SERPL CALCULATED.3IONS-SCNC: 3 MMOL/L (ref 3–14)
AST SERPL W P-5'-P-CCNC: 46 U/L (ref 0–45)
BASOPHILS # BLD AUTO: 0 10E9/L (ref 0–0.2)
BASOPHILS NFR BLD AUTO: 0.5 %
BILIRUB SERPL-MCNC: 0.5 MG/DL (ref 0.2–1.3)
BUN SERPL-MCNC: 26 MG/DL (ref 7–30)
CALCIUM SERPL-MCNC: 8.8 MG/DL (ref 8.5–10.1)
CHLORIDE SERPL-SCNC: 116 MMOL/L (ref 94–109)
CO2 SERPL-SCNC: 21 MMOL/L (ref 20–32)
CREAT SERPL-MCNC: 1.08 MG/DL (ref 0.52–1.04)
DIFFERENTIAL METHOD BLD: ABNORMAL
EOSINOPHIL # BLD AUTO: 0.1 10E9/L (ref 0–0.7)
EOSINOPHIL NFR BLD AUTO: 2.2 %
ERYTHROCYTE [DISTWIDTH] IN BLOOD BY AUTOMATED COUNT: 14.3 % (ref 10–15)
GFR SERPL CREATININE-BSD FRML MDRD: 53 ML/MIN/{1.73_M2}
GLUCOSE SERPL-MCNC: 165 MG/DL (ref 70–99)
HCT VFR BLD AUTO: 30.9 % (ref 35–47)
HGB BLD-MCNC: 10.2 G/DL (ref 11.7–15.7)
IMM GRANULOCYTES # BLD: 0 10E9/L (ref 0–0.4)
IMM GRANULOCYTES NFR BLD: 0.3 %
LYMPHOCYTES # BLD AUTO: 1.3 10E9/L (ref 0.8–5.3)
LYMPHOCYTES NFR BLD AUTO: 21.3 %
MCH RBC QN AUTO: 31.1 PG (ref 26.5–33)
MCHC RBC AUTO-ENTMCNC: 33 G/DL (ref 31.5–36.5)
MCV RBC AUTO: 94 FL (ref 78–100)
MONOCYTES # BLD AUTO: 0.3 10E9/L (ref 0–1.3)
MONOCYTES NFR BLD AUTO: 4.4 %
NEUTROPHILS # BLD AUTO: 4.2 10E9/L (ref 1.6–8.3)
NEUTROPHILS NFR BLD AUTO: 71.3 %
PLATELET # BLD AUTO: 103 10E9/L (ref 150–450)
POTASSIUM SERPL-SCNC: 4.8 MMOL/L (ref 3.4–5.3)
PROT SERPL-MCNC: 8 G/DL (ref 6.8–8.8)
RBC # BLD AUTO: 3.28 10E12/L (ref 3.8–5.2)
SODIUM SERPL-SCNC: 140 MMOL/L (ref 133–144)
WBC # BLD AUTO: 5.9 10E9/L (ref 4–11)

## 2021-02-09 PROCEDURE — 80053 COMPREHEN METABOLIC PANEL: CPT | Performed by: FAMILY MEDICINE

## 2021-02-09 PROCEDURE — 85025 COMPLETE CBC W/AUTO DIFF WBC: CPT | Performed by: FAMILY MEDICINE

## 2021-02-09 PROCEDURE — 36415 COLL VENOUS BLD VENIPUNCTURE: CPT | Performed by: FAMILY MEDICINE

## 2021-02-10 ENCOUNTER — TELEPHONE (OUTPATIENT)
Dept: FAMILY MEDICINE | Facility: CLINIC | Age: 66
End: 2021-02-10

## 2021-02-10 NOTE — TELEPHONE ENCOUNTER
Reason for Call:  Other call back    Detailed comments: PT had a lab visit 02/09 and would like a nurse to call her and explain the results     Phone Number Patient can be reached at: Home number on file [phone] (home)    Best Time: Anytime    Can we leave a detailed message on this number? YES    Call taken on 2/10/2021 at 10:50 AM by Neris Arango

## 2021-02-11 NOTE — RESULT ENCOUNTER NOTE
Please notify patient: looks like she has developed diabetes. Recommend   1. Referral to diabetes nurse to learn how to monitor blood glucose  2. Blood pressure cuff with new goal of 130/80, to reduce the progress of early chronic kidney disease.  3. Followup with liver doctors as liver is slightly inflamed again.  4. Appointment with me as 6 month followup is overdue. Face to face best- virtual if need be.     Thanks Ishaan

## 2021-02-12 ENCOUNTER — TELEPHONE (OUTPATIENT)
Dept: FAMILY MEDICINE | Facility: CLINIC | Age: 66
End: 2021-02-12

## 2021-02-12 DIAGNOSIS — E11.9 DIABETES MELLITUS (H): Primary | ICD-10-CM

## 2021-02-12 DIAGNOSIS — E11.9 TYPE 2 DIABETES, HBA1C GOAL < 7% (H): ICD-10-CM

## 2021-02-12 NOTE — TELEPHONE ENCOUNTER
Dr. West,     Pt informed of results below, she indicated that she was not fasting for this lab test.   1. Do you want an A1C done?   2. Do you want Pt to do a video visit with MTM to go through meter and edu? Nurse can put referral in.   Or  So you want to order Adult diabetic referral instead?     Please notify patient: looks like she has developed diabetes. Recommend   1. Referral to diabetes nurse to learn how to monitor blood glucose   2. Blood pressure cuff with new goal of 130/80, to reduce the progress of early chronic kidney disease.   3. Followup with liver doctors as liver is slightly inflamed again.   4. Appointment with me as 6 month followup is overdue. Face to face best- virtual if need be.     Thanks Ishaan Nichols RN   [facility]

## 2021-02-16 NOTE — TELEPHONE ENCOUNTER
Dr. West,    MTGIORGI referral cued. Please sign    Thanks  Radha Moulton RN   [facility]

## 2021-02-18 NOTE — PROGRESS NOTES
Josette is a 66 year old who is being evaluated via a billable video visit.      How would you like to obtain your AVS? MyChart  If the video visit is dropped, the invitation should be resent by: Text to cell phone: [phone]  Will anyone else be joining your video visit? Yes: Carlos. How would they like to receive their invitation? Text to cell phone: Same video    Video Start Time: ~9:15 AM    Assessment & Plan     Type 2 diabetes, HbA1c goal < 7% (H)  - blood glucose monitoring (NO BRAND SPECIFIED) meter device kit; Use to test blood sugar 2 times daily or as directed.    Hypertension, benign essential, goal below 140/90  - Home Blood Pressure Monitor Order for DME - ONLY FOR DME    Anxiety associated with depression  - sertraline (ZOLOFT) 100 MG tablet; Take 1 tablet (100 mg) by mouth daily    Encounter for monitoring Suboxone maintenance therapy  - buprenorphine-naloxone (SUBOXONE) 2-0.5 MG SUBL sublingual tablet; DISSOLVE 1 TABLET UNDER THE TONGUE TWICE DAILY    Review of external notes as documented elsewhere in note  30 minutes spent on the date of the encounter doing chart review, history and exam, documentation and further activities as noted above    Tobacco Cessation:   reports that she has been smoking cigarettes. She has a 12.50 pack-year smoking history. She has never used smokeless tobacco.  Tobacco Cessation Action Plan: Information offered: Patient not interested at this time    Depression Screening Follow Up    PHQ 2/19/2021   PHQ-9 Total Score 13   Q9: Thoughts of better off dead/self-harm past 2 weeks Not at all     Last PHQ-9 2/19/2021   1.  Little interest or pleasure in doing things 3   2.  Feeling down, depressed, or hopeless 3   3.  Trouble falling or staying asleep, or sleeping too much 3   4.  Feeling tired or having little energy 3   5.  Poor appetite or overeating 0   6.  Feeling bad about yourself 1   7.  Trouble concentrating 0   8.  Moving slowly or restless 0   Q9: Thoughts of  better off dead/self-harm past 2 weeks 0   PHQ-9 Total Score 13   Difficulty at work, home, or with people Somewhat difficult       Follow Up Actions Taken  Patient has experienced a recent significant life event.  Patient counseled, no additional follow up at this time.     Patient Instructions   MTM to help with diabetes. Call in 2 weeks with effects of increased sertraline.        Return in about 2 weeks (around 3/5/2021).    Ishaan West MD  [facility] is a 66 year old who presents for the following health issues     HPI   Suboxone maintenance:     Hypertension Follow-up      Do you check your blood pressure regularly outside of the clinic? No     Are you following a low salt diet? Yes    Are your blood pressures ever more than 140 on the top number (systolic) OR more   than 90 on the bottom number (diastolic), for example 140/90? No-Unknown    Depression and Anxiety Follow-Up    How are you doing with your depression since your last visit? No change    How are you doing with your anxiety since your last visit?  No change    Are you having other symptoms that might be associated with depression or anxiety? No    Have you had a significant life event? No     Do you have any concerns with your use of alcohol or other drugs? No    Social History     Tobacco Use     Smoking status: Current Every Day Smoker     Packs/day: 0.50     Years: 25.00     Pack years: 12.50     Types: Cigarettes     Smokeless tobacco: Never Used   Substance Use Topics     Alcohol use: No     Alcohol/week: 0.0 standard drinks     Drug use: No     PHQ 3/2/2016 5/8/2017 1/21/2020   PHQ-9 Total Score 9 6 6   Q9: Thoughts of better off dead/self-harm past 2 weeks Not at all Not at all Not at all     GABRIELLE-7 SCORE 6/19/2020   Total Score 7     Last PHQ-9 2/19/2021   1.  Little interest or pleasure in doing things 3   2.  Feeling down, depressed, or hopeless 3   3.  Trouble falling or staying  asleep, or sleeping too much 3   4.  Feeling tired or having little energy 3   5.  Poor appetite or overeating 0   6.  Feeling bad about yourself 1   7.  Trouble concentrating 0   8.  Moving slowly or restless 0   Q9: Thoughts of better off dead/self-harm past 2 weeks 0   PHQ-9 Total Score 13   Difficulty at work, home, or with people Somewhat difficult     GABRIELLE-7  2/19/2021   1. Feeling nervous, anxious, or on edge 3   2. Not being able to stop or control worrying 0   3. Worrying too much about different things 0   4. Trouble relaxing 2   5. Being so restless that it is hard to sit still 0   6. Becoming easily annoyed or irritable 2   7. Feeling afraid, as if something awful might happen 0   GABRIELLE-7 Total Score 7   If you checked any problems, how difficult have they made it for you to do your work, take care of things at home, or get along with other people? Somewhat difficult         How many servings of fruits and vegetables do you eat daily?  2-3    On average, how many sweetened beverages do you drink each day (Examples: soda, juice, sweet tea, etc.  Do NOT count diet or artificially sweetened beverages)?   0    How many days per week do you exercise enough to make your heart beat faster? 3 or less    How many minutes a day do you exercise enough to make your heart beat faster? 9 or less    How many days per week do you miss taking your medication? 0    Diabetes Follow-up      How often are you checking your blood sugar? Not at all    What concerns do you have today about your diabetes? None     Do you have any of these symptoms? (Select all that apply)  No numbness or tingling in feet.  No redness, sores or blisters on feet.  No complaints of excessive thirst.  No reports of blurry vision.  No significant changes to weight.    Have you had a diabetic eye exam in the last 12 months?         BP Readings from Last 2 Encounters:   03/04/20 (!) 158/72   11/18/19 128/72     Hemoglobin A1C (%)   Date Value   06/05/2019  6.0 (H)   04/27/2018 5.9 (H)     LDL Cholesterol Calculated (mg/dL)   Date Value   11/22/2017 31   07/28/2016 41       Review of Systems   Constitutional, HEENT, cardiovascular, pulmonary, gi and gu systems are negative, except as otherwise noted.      Objective           Vitals:  No vitals were obtained today due to virtual visit.    Physical Exam   GENERAL: Healthy, alert and no distress  EYES: Eyes grossly normal to inspection.  No discharge or erythema, or obvious scleral/conjunctival abnormalities.  RESP: No audible wheeze, cough, or visible cyanosis.  No visible retractions or increased work of breathing.    SKIN: Visible skin clear. No significant rash, abnormal pigmentation or lesions.  NEURO: Cranial nerves grossly intact.  Mentation and speech appropriate for age.  PSYCH: Mentation appears normal, affect normal/bright, judgement and insight intact, normal speech and appearance well-groomed.        Video-Visit Details    Type of service:  Video Visit    Video End Time:~9:48 AM    Originating Location (pt. Location): Home    Distant Location (provider location):  [facility]     Platform used for Video Visit: Fylet

## 2021-02-19 ENCOUNTER — VIRTUAL VISIT (OUTPATIENT)
Dept: FAMILY MEDICINE | Facility: CLINIC | Age: 66
End: 2021-02-19
Payer: MEDICARE

## 2021-02-19 DIAGNOSIS — Z79.899 ENCOUNTER FOR MONITORING SUBOXONE MAINTENANCE THERAPY: ICD-10-CM

## 2021-02-19 DIAGNOSIS — I10 HYPERTENSION, BENIGN ESSENTIAL, GOAL BELOW 140/90: ICD-10-CM

## 2021-02-19 DIAGNOSIS — F41.8 ANXIETY ASSOCIATED WITH DEPRESSION: ICD-10-CM

## 2021-02-19 DIAGNOSIS — E11.9 TYPE 2 DIABETES, HBA1C GOAL < 7% (H): Primary | ICD-10-CM

## 2021-02-19 DIAGNOSIS — Z51.81 ENCOUNTER FOR MONITORING SUBOXONE MAINTENANCE THERAPY: ICD-10-CM

## 2021-02-19 PROCEDURE — 96127 BRIEF EMOTIONAL/BEHAV ASSMT: CPT | Performed by: FAMILY MEDICINE

## 2021-02-19 PROCEDURE — 99214 OFFICE O/P EST MOD 30 MIN: CPT | Mod: 95 | Performed by: FAMILY MEDICINE

## 2021-02-19 RX ORDER — SERTRALINE HYDROCHLORIDE 100 MG/1
100 TABLET, FILM COATED ORAL DAILY
Qty: 90 TABLET | Refills: 3 | Status: SHIPPED | OUTPATIENT
Start: 2021-02-19 | End: 2022-04-20

## 2021-02-19 RX ORDER — BUPRENORPHINE HYDROCHLORIDE AND NALOXONE HYDROCHLORIDE DIHYDRATE 2; .5 MG/1; MG/1
TABLET SUBLINGUAL
Qty: 60 TABLET | Refills: 0 | Status: SHIPPED | OUTPATIENT
Start: 2021-02-19 | End: 2021-03-23

## 2021-02-19 ASSESSMENT — ANXIETY QUESTIONNAIRES
GAD7 TOTAL SCORE: 7
7. FEELING AFRAID AS IF SOMETHING AWFUL MIGHT HAPPEN: NOT AT ALL
3. WORRYING TOO MUCH ABOUT DIFFERENT THINGS: NOT AT ALL
IF YOU CHECKED OFF ANY PROBLEMS ON THIS QUESTIONNAIRE, HOW DIFFICULT HAVE THESE PROBLEMS MADE IT FOR YOU TO DO YOUR WORK, TAKE CARE OF THINGS AT HOME, OR GET ALONG WITH OTHER PEOPLE: SOMEWHAT DIFFICULT
6. BECOMING EASILY ANNOYED OR IRRITABLE: MORE THAN HALF THE DAYS
1. FEELING NERVOUS, ANXIOUS, OR ON EDGE: NEARLY EVERY DAY
5. BEING SO RESTLESS THAT IT IS HARD TO SIT STILL: NOT AT ALL
2. NOT BEING ABLE TO STOP OR CONTROL WORRYING: NOT AT ALL

## 2021-02-19 ASSESSMENT — PATIENT HEALTH QUESTIONNAIRE - PHQ9
5. POOR APPETITE OR OVEREATING: MORE THAN HALF THE DAYS
SUM OF ALL RESPONSES TO PHQ QUESTIONS 1-9: 13

## 2021-02-20 ASSESSMENT — ANXIETY QUESTIONNAIRES: GAD7 TOTAL SCORE: 7

## 2021-02-25 ENCOUNTER — TELEPHONE (OUTPATIENT)
Dept: FAMILY MEDICINE | Facility: CLINIC | Age: 66
End: 2021-02-25

## 2021-02-25 DIAGNOSIS — E11.9 TYPE 2 DIABETES, HBA1C GOAL < 7% (H): Primary | ICD-10-CM

## 2021-02-25 NOTE — TELEPHONE ENCOUNTER
Dr. West,    Patient called.  Picked up new glucose monitor and needs test strips.    Contour next test strips.  Order T'd up.    [facility].  Order faxed ([phone]).    Thanks,  Massiel Keys RN

## 2021-03-01 NOTE — TELEPHONE ENCOUNTER
Updated Carlos - consent on file - he requests BP DME order be sent to pharmacy as well - writer faxed 3/1/2021    Signed Prescriptions:                        Disp   Refills    blood glucose (NO BRAND SPECIFIED) test st*100 st*3        Sig: Use to test blood sugar 2 times daily or as directed.           Contour next test strips  Authorizing Provider: PAULINA DICKINSON

## 2021-03-02 ENCOUNTER — VIRTUAL VISIT (OUTPATIENT)
Dept: PHARMACY | Facility: CLINIC | Age: 66
End: 2021-03-02
Payer: COMMERCIAL

## 2021-03-02 DIAGNOSIS — Z51.81 ENCOUNTER FOR MONITORING SUBOXONE MAINTENANCE THERAPY: ICD-10-CM

## 2021-03-02 DIAGNOSIS — I10 HYPERTENSION, BENIGN ESSENTIAL, GOAL BELOW 140/90: ICD-10-CM

## 2021-03-02 DIAGNOSIS — R79.89 LOW VITAMIN D LEVEL: ICD-10-CM

## 2021-03-02 DIAGNOSIS — L01.00 IMPETIGO: ICD-10-CM

## 2021-03-02 DIAGNOSIS — G25.81 RESTLESS LEGS SYNDROME (RLS): ICD-10-CM

## 2021-03-02 DIAGNOSIS — Z86.19 HISTORY OF HERPES GENITALIS: ICD-10-CM

## 2021-03-02 DIAGNOSIS — Z79.899 ENCOUNTER FOR MONITORING SUBOXONE MAINTENANCE THERAPY: ICD-10-CM

## 2021-03-02 DIAGNOSIS — E11.9 TYPE 2 DIABETES MELLITUS WITHOUT COMPLICATION, WITHOUT LONG-TERM CURRENT USE OF INSULIN (H): Primary | ICD-10-CM

## 2021-03-02 DIAGNOSIS — F10.288 ALCOHOL DEPENDENCE WITH OTHER ALCOHOL-INDUCED DISORDER (H): ICD-10-CM

## 2021-03-02 DIAGNOSIS — F41.8 ANXIETY ASSOCIATED WITH DEPRESSION: ICD-10-CM

## 2021-03-02 PROBLEM — R03.0 ELEVATED BLOOD PRESSURE READING WITHOUT DIAGNOSIS OF HYPERTENSION: Status: ACTIVE | Noted: 2021-03-02

## 2021-03-02 PROCEDURE — 99605 MTMS BY PHARM NP 15 MIN: CPT | Performed by: PHARMACIST

## 2021-03-02 PROCEDURE — 99607 MTMS BY PHARM ADDL 15 MIN: CPT | Performed by: PHARMACIST

## 2021-03-02 NOTE — PATIENT INSTRUCTIONS
Recommendations from today's MTM visit:                                                    MTM (medication therapy management) is a service provided by a clinical pharmacist designed to help you get the most of out of your medicines.   Today we reviewed what your medicines are for, how to know if they are working, that your medicines are safe and how to make your medicine regimen as easy as possible.    1. Test and write down your blood sugars 4-5 times each for the following times of day: fasting before breakfast, 2 hours after breakfast, before dinner, 2 hours after dinner. I will send you a blood sugar log in the mail    2. Start checking blood pressure at home regularly.     3. Monitor for depression symptoms:    Mood: anxiety, general discontent, guilt, feeling hopeless, loss of interest or pleasure in activities, mood swings, sadness    Behavioral: agitation, excessive crying, irritability, restlessness, or social isolation    Sleep: early awakening, excess sleepiness, insomnia, or restless sleep    Whole body: excessive hunger, fatigue, or loss of appetite    Cognitive: lack of concentration, slowness in activity, or thoughts of suicide    Weight: weight gain or weight loss    4. I will follow up with Dr. West for mupirocin cream refill    5. I will also talk to Dr. West about a DEXA scan to check your bone health. I do recommend restarting calcium citrate with D - 2 tablets a day.     It was great to speak with you today.  I value your experience and would be very thankful for your time with providing feedback on our clinic survey. You may receive a survey via email or text message in the next few days.     Next MTM visit: 4/13/21    To schedule another MTM appointment, please call the clinic directly or you may call the MTM scheduling line at [phone] or toll-free at [phone].     My Clinical Pharmacist's contact information:                                                      It was a pleasure  talking with you today!  Please feel free to contact me with any questions or concerns you have.      Shelli Chin, PharmD, [facility] Center[facility]   Medication Management Pharmacist   [facility]  [phone]

## 2021-03-02 NOTE — PROGRESS NOTES
Medication Therapy Management (MTM) Encounter    ASSESSMENT:                            Medication Adherence/Access: No issues identified    Type 2 Diabetes: Patient reported that most recent in clinic glucose of 165 mg/dL was post-prandial, which is at goal of <180mg/dL. Consider A1c recheck with next labs, however note possible falsely low reading with liver disease. Will start with self-monitoring, education provided on use of glucometer.    Hypertension: start home monitoring, no recent clinic BP checks.    Anxiety/Depression: education provided on symptom monitoring with dose change.     Restless leg syndrome: Stable    Rash: Mupirocin cream is . Will follow up with PCP for refill.    HSV: stable    Suboxone maintenance therapy: Stable. Reasonable to discuss discontinuation if desired.     Alcohol dependence: offered support for therapy/addiction and she will consider.  Continue harm reduction.     Bone health:  Consider DEXA scan. Patient will benefit from restarting calcium citrate+vitamin D 2 pills daily.    PLAN:                            1. Start checking blood glucose and blood pressure at home regularly    2. Consider mupirocin refill - contacted PCP, awaiting reply    3. Consider DEXA scan - contacted PCP, awaiting reply    4. Start calcium citrate 2 tablets daily    Follow-up: 1 month    SUBJECTIVE/OBJECTIVE:                          Josette Palacios is a 66 year old female contacted via secure video for an initial visit. She was referred to me from Ishaan West. Patient was accompanied by Carlos ().     Reason for visit: diabetes review.    Allergies/ADRs: Reviewed in chart  Tobacco: She reports that she has been smoking cigarettes. She has a 12.50 pack-year smoking history. She has never used smokeless tobacco.  Alcohol: history of alcohol dependence. Continues to drink 7+ drinks per week.     Medication Adherence/Access: no issues reported    Type 2 Diabetes:  Not taking any  medications.  Blood sugar in clinic: 21 165 (per patient had juice 30 minutes before test)  Blood sugar monitoring: BG meter taught today.  Symptoms of low blood sugar? none  Symptoms of high blood sugar? polydipsia, nocturia and fatigue  Eye exam: due  Foot exam: due  Aspirin: Not taking  Statin: No   ACEi/ARB: No.   Urine Albumin:   Lab Results   Component Value Date    UMALCR 806.90 (H) 2017      Lab Results   Component Value Date    A1C 6.0 2019    A1C 5.9 2018    A1C 5.5 2017    A1C 5.7 02/15/2017    A1C 4.9 2016   History of A1c >6.5    Hypertension: Current medications metoprolol ER 25mg daily.  Patient does not self-monitor blood pressure.  Patient reports no current medication side effects. Request blood pressure cuff sent to pharmacy  BP Readings from Last 3 Encounters:   20 (!) 158/72   19 128/72   19 140/70     Anxiety/Depression:  Current medications include: sertraline 100mg  once daily (recent increase, on new dose for couple days). Trazodone PRN, rarely uses.  No side effects reported. Patient reported that her mood is low during winter and gets better in the spring. Never feels like her mood improves much though. She is hopeful for improvement. No history of suicidal thoughts. Tearful when asked about alcohol dependence and history of opioid dependence.  PHQ-9 SCORE 2017   PHQ-9 Total Score - - -   PHQ-9 Total Score MyChart - 6 (Mild depression) -   PHQ-9 Total Score 6 6 13     Restless leg syndrome: currently taking pramipexole 0.125mg daily, which is working per patient. No reported side effects.    Rash: complained of itchiness from sores on back. Currently using mupirocin 2% cream as needed ( in ) and wonders if she can have refill.     HSV: valacyclovir 500mg twice daily as needed, usually outbreak 1-2 times a year and works well.     Suboxone maintenance therapy: currently taking Suboxone SL 2-0.5mg 1-2  "times daily. Reported no side effects. Unsure if it helps with pain. Patient states \"it is in her head\" that she needs the medication. Denies any heroin cravings for many years. Per , she has thought about discontinuing the medication since it is a low dose.     Bone health: no current medications. History of bone fracture and falls. Reports having DEXA about 4 years ago in [facility] but unable to find results. Used to take calcium citrate+vitamin D, not currently using.     Alcohol dependence: Patient reports that she is still using alcohol about one drink daily, but working to cut back.  helps with limiting alcohol.     Today's Vitals: LMP  (LMP Unknown)   ----------------    I spent 56 minutes with this patient today. All changes were made via collaborative practice agreement with Ishaan West. A copy of the visit note was provided to the patient's primary care provider.    The patient was sent via [employer] a summary of these recommendations.     Ishaan Willingham, 4th Year Student Pharmacist  Shelli Chin, PharmD, BCACP     Telemedicine Visit Details  Type of service:  Video Conference via [employer]  Start Time: 3:08 PM  End Time: 4:04PM  Originating Location (patient location): Home  Distant Location (provider location):  [facility]       Medication Therapy Recommendations  Hypertension, benign essential, goal below 140/90    Rationale: Medication requires monitoring - Needs additional monitoring   Recommendation: Self-Monitoring - Adult Blood Pressure Cuff Lg Kit - prescription for bp cuff sent to pharmacy   Status: Accepted per CPA         Low vitamin D level    Current Medication: calcium citrate-vitamin D (CITRACAL) 315-200 MG-UNIT TABS per tablet   Rationale: Untreated condition - Needs additional medication therapy - Indication   Recommendation: Start Medication   Status: Accepted - no CPA Needed         Type 2 diabetes mellitus without complication, without " long-term current use of insulin (H)    Current Medication: blood glucose (NO BRAND SPECIFIED) test strip   Rationale: Does not understand instructions - Adherence - Adherence   Recommendation: Provide Education - provided education on testing   Status: Patient Agreed - Adherence/Education

## 2021-03-02 NOTE — Clinical Note
Can you please call Venita and her  with an update? Dr West wants her to take lisinopril once a day for kidney health and mupirocin was refilled. Please ask them to come in for labs (blood and urine) in 2 weeks to recheck kidneys and diabetes. I'm working on ordering the DEXA too FYI.

## 2021-03-03 ENCOUNTER — TELEPHONE (OUTPATIENT)
Dept: FAMILY MEDICINE | Facility: CLINIC | Age: 66
End: 2021-03-03

## 2021-03-03 DIAGNOSIS — E11.9 TYPE 2 DIABETES, HBA1C GOAL < 7% (H): Primary | ICD-10-CM

## 2021-03-03 RX ORDER — MUPIROCIN CALCIUM 20 MG/G
CREAM TOPICAL 3 TIMES DAILY
Qty: 30 G | Refills: 0 | Status: SHIPPED | OUTPATIENT
Start: 2021-03-03 | End: 2023-06-02

## 2021-03-03 RX ORDER — LISINOPRIL 10 MG/1
10 TABLET ORAL DAILY
Qty: 90 TABLET | Refills: 0 | Status: SHIPPED | OUTPATIENT
Start: 2021-03-03 | End: 2021-06-15

## 2021-03-03 NOTE — TELEPHONE ENCOUNTER
CC'd Chart message:      Shelli Chin, [facility]  P [facility] All Nurse Pool             Can you please call Venita and her  with an update? Dr West wants her to take lisinopril once a day for kidney health and mupirocin was refilled. Please ask them to come in for labs (blood and urine) in 2 weeks to recheck kidneys and diabetes. I'm working on ordering the DEXA too FYI.

## 2021-03-03 NOTE — PROGRESS NOTES
OK per Epic message from PCP to refill mupirocin, order DEXA.    Additionally:  I also meant to talk with her about albuminuria, slightly decr GFR, diabetic. Would you ask if she's willing to take lisinopril 10 mg daily- try at bedtime, watch for cough. I'd like her to get BMP, urinary albumen in a couple weeks. What do you think?  Thanks Ishaan      Agree with adding lisinopril and updating labs. Also added A1c to lab check.     Routing to RN to please call patient regarding medication changes and ask for lab check in 2 weeks.    Shelli Chin, EarnestD, BCACP

## 2021-03-04 NOTE — TELEPHONE ENCOUNTER
Dr. Jenna TUCKER,    Scheduled to get katherin and katherin vaccine on Saturday.  Updated patient and  on directions below. Verbalized understanding  Radha Moulton RN   [facility]

## 2021-03-06 ENCOUNTER — IMMUNIZATION (OUTPATIENT)
Dept: NURSING | Facility: CLINIC | Age: 66
End: 2021-03-06
Payer: MEDICARE

## 2021-03-06 PROCEDURE — 91303 PR COVID VAC JANSSEN AD26 0.5ML: CPT

## 2021-03-06 PROCEDURE — 0031A PR COVID VAC JANSSEN AD26 0.5ML: CPT

## 2021-03-23 DIAGNOSIS — Z79.899 ENCOUNTER FOR MONITORING SUBOXONE MAINTENANCE THERAPY: ICD-10-CM

## 2021-03-23 DIAGNOSIS — I10 HYPERTENSION, BENIGN ESSENTIAL, GOAL BELOW 140/90: ICD-10-CM

## 2021-03-23 DIAGNOSIS — Z51.81 ENCOUNTER FOR MONITORING SUBOXONE MAINTENANCE THERAPY: ICD-10-CM

## 2021-03-23 RX ORDER — BUPRENORPHINE HYDROCHLORIDE AND NALOXONE HYDROCHLORIDE DIHYDRATE 2; .5 MG/1; MG/1
TABLET SUBLINGUAL
Qty: 60 TABLET | Refills: 0 | Status: SHIPPED | OUTPATIENT
Start: 2021-03-23 | End: 2021-04-21

## 2021-03-23 RX ORDER — BUPRENORPHINE HYDROCHLORIDE AND NALOXONE HYDROCHLORIDE DIHYDRATE 2; .5 MG/1; MG/1
TABLET SUBLINGUAL
Qty: 60 TABLET | Refills: 0 | Status: SHIPPED | OUTPATIENT
Start: 2021-03-23 | End: 2021-03-23

## 2021-03-23 RX ORDER — METOPROLOL SUCCINATE 25 MG/1
TABLET, EXTENDED RELEASE ORAL
Qty: 90 TABLET | Refills: 3 | Status: SHIPPED | OUTPATIENT
Start: 2021-03-23 | End: 2022-01-10

## 2021-03-23 NOTE — TELEPHONE ENCOUNTER
"Requested Prescriptions   Pending Prescriptions Disp Refills     buprenorphine-naloxone (SUBOXONE) 2-0.5 MG SUBL sublingual tablet [Pharmacy Med Name: BUPRENORPHINE/NALOX 2MG/0.5MG SL TB] 60 tablet 0     Sig: DISSOLVE 1 TABLET UNDER THE TONGUE TWICE DAILY       There is no refill protocol information for this order   Routing refill request to provider for review/approval because:  Drug not on the [facility] [place] [place] refill protocol          metoprolol succinate ER (TOPROL-XL) 25 MG 24 hr tablet [Pharmacy Med Name: METOPROLOL ER SUCCINATE 25MG TABS] 90 tablet 3     Sig: TAKE 1 TABLET(25 MG) BY MOUTH AT BEDTIME       Beta-Blockers Protocol Failed - 3/23/2021 11:36 AM        Failed - Blood pressure under 140/90 in past 12 months     BP Readings from Last 3 Encounters:   03/04/20 (!) 158/72   11/18/19 128/72   07/12/19 140/70                 Passed - Patient is age 6 or older        Passed - Recent (12 mo) or future (30 days) visit within the authorizing provider's specialty     Patient has had an office visit with the authorizing provider or a provider within the authorizing providers department within the previous 12 mos or has a future within next 30 days. See \"Patient Info\" tab in inbasket, or \"Choose Columns\" in Meds & Orders section of the refill encounter.              Passed - Medication is active on med list      Routing refill request to provider for review/approval because:  BP does not meet RN refill protocol   Radha Moulton RN   [facility]   "

## 2021-03-26 ENCOUNTER — TELEPHONE (OUTPATIENT)
Dept: FAMILY MEDICINE | Facility: CLINIC | Age: 66
End: 2021-03-26

## 2021-03-26 NOTE — TELEPHONE ENCOUNTER
Forms completed and faxed back to Corey  @ [phone]. Placed into [employer] to scan into patients chart.   Jalen Hopkins MA

## 2021-05-23 DIAGNOSIS — Z51.81 ENCOUNTER FOR MONITORING SUBOXONE MAINTENANCE THERAPY: ICD-10-CM

## 2021-05-23 DIAGNOSIS — Z79.899 ENCOUNTER FOR MONITORING SUBOXONE MAINTENANCE THERAPY: ICD-10-CM

## 2021-05-24 RX ORDER — BUPRENORPHINE HYDROCHLORIDE AND NALOXONE HYDROCHLORIDE DIHYDRATE 2; .5 MG/1; MG/1
TABLET SUBLINGUAL
Qty: 60 TABLET | Refills: 0 | Status: SHIPPED | OUTPATIENT
Start: 2021-05-24 | End: 2021-05-24

## 2021-05-24 RX ORDER — BUPRENORPHINE HYDROCHLORIDE AND NALOXONE HYDROCHLORIDE DIHYDRATE 2; .5 MG/1; MG/1
TABLET SUBLINGUAL
Qty: 60 TABLET | Refills: 0 | Status: SHIPPED | OUTPATIENT
Start: 2021-05-24 | End: 2021-08-03

## 2021-05-24 NOTE — TELEPHONE ENCOUNTER
Requested Prescriptions   Pending Prescriptions Disp Refills     buprenorphine-naloxone (SUBOXONE) 2-0.5 MG SUBL sublingual tablet [Pharmacy Med Name: BUPRENORPHINE/NALOX 2MG/0.5MG SL TB] 60 tablet      Sig: DISSOLVE 1 TABLET UNDER THE TONGUE TWICE DAILY       There is no refill protocol information for this order        Routing refill request to provider for review/approval because:  Drug not on the [facility] refill protocol

## 2021-07-15 NOTE — TELEPHONE ENCOUNTER
Controlled Substance Refill Request for buprenorphine-naloxone (SUBOXONE) 2-0.5 MG SUBL sublingual tablet  Problem List Complete:  No     PROVIDER TO CONSIDER COMPLETION OF PROBLEM LIST AND OVERVIEW/CONTROLLED SUBSTANCE AGREEMENT    Last Written Prescription Date:  07/08/2018  Last Fill Quantity: 60,   # refills: 1    Last Office Visit with [facility] [place]: 04/27/2018    Future Office visit:   Next 5 appointments (look out 90 days)     Sep 05, 2018 10:30 AM CDT   Office Visit with Ishaan West MD   [facility]    [address] [place] [id number] [phone]                  Controlled substance agreement on file: No.     Processing:  Patient will  in clinic   checked in past 3 months?  No, route to RN     Principal Discharge DX:	Urinary tract infection

## 2021-08-03 DIAGNOSIS — Z51.81 ENCOUNTER FOR MONITORING SUBOXONE MAINTENANCE THERAPY: ICD-10-CM

## 2021-08-03 DIAGNOSIS — Z79.899 ENCOUNTER FOR MONITORING SUBOXONE MAINTENANCE THERAPY: ICD-10-CM

## 2021-08-03 NOTE — TELEPHONE ENCOUNTER
"Requested Prescriptions   Pending Prescriptions Disp Refills     buprenorphine-naloxone (SUBOXONE) 2-0.5 MG SUBL sublingual tablet 60 tablet 0     Sig: DISSOLVE 1 TABLET UNDER THE TONGUE TWICE DAILY       There is no refill protocol information for this order        Routing refill request to provider for review/approval because:  Drug not on the [facility] refill protocol     Patient reports \"sometimes I take just one - it all varies on how I feel - I don't always take two a day\" - she says last rx 5/24/21 has lasted her until now    [place] 8/3/2021  Last refill: 5/27/21  Disp: 60    Opioid relapse concerns:  None    Patient reports she still has enough to get through the weekend - she is hoping to  medication when she returns    "

## 2021-08-03 NOTE — TELEPHONE ENCOUNTER
Reason for Call:  Medication or medication refill:    Do you use a [facility]?  Name of the pharmacy and phone number for the current request: [address], [place] [facility] [address]    Name of the medication requested: buprenorphine (SUBOXINE) 2-.05 mg   Other request: needs med refill preferably today, got emergency call has to go out of town    Can we leave a detailed message on this number? YES    Phone number patient can be reached at: Cell number on file:    Telephone Information:   Mobile [phone]       Best Time: anytime    Call taken on 8/3/2021 at 12:33 PM by Shaunna Traylor

## 2021-08-04 RX ORDER — BUPRENORPHINE HYDROCHLORIDE AND NALOXONE HYDROCHLORIDE DIHYDRATE 2; .5 MG/1; MG/1
TABLET SUBLINGUAL
Qty: 60 TABLET | Refills: 0 | Status: SHIPPED | OUTPATIENT
Start: 2021-08-04 | End: 2021-09-14

## 2021-09-10 DIAGNOSIS — Z79.899 ENCOUNTER FOR MONITORING SUBOXONE MAINTENANCE THERAPY: ICD-10-CM

## 2021-09-10 DIAGNOSIS — Z51.81 ENCOUNTER FOR MONITORING SUBOXONE MAINTENANCE THERAPY: ICD-10-CM

## 2021-09-14 RX ORDER — BUPRENORPHINE HYDROCHLORIDE AND NALOXONE HYDROCHLORIDE DIHYDRATE 2; .5 MG/1; MG/1
TABLET SUBLINGUAL
Qty: 30 TABLET | Refills: 0 | Status: SHIPPED | OUTPATIENT
Start: 2021-09-14 | End: 2021-09-16

## 2021-09-16 ENCOUNTER — TELEPHONE (OUTPATIENT)
Dept: FAMILY MEDICINE | Facility: CLINIC | Age: 66
End: 2021-09-16

## 2021-09-16 DIAGNOSIS — E11.9 TYPE 2 DIABETES MELLITUS WITHOUT COMPLICATION, WITHOUT LONG-TERM CURRENT USE OF INSULIN (H): Primary | ICD-10-CM

## 2021-09-16 DIAGNOSIS — Z79.899 ENCOUNTER FOR MONITORING SUBOXONE MAINTENANCE THERAPY: ICD-10-CM

## 2021-09-16 DIAGNOSIS — Z51.81 ENCOUNTER FOR MONITORING SUBOXONE MAINTENANCE THERAPY: ICD-10-CM

## 2021-09-16 RX ORDER — BUPRENORPHINE HYDROCHLORIDE AND NALOXONE HYDROCHLORIDE DIHYDRATE 2; .5 MG/1; MG/1
1 TABLET SUBLINGUAL 2 TIMES DAILY
Qty: 28 TABLET | Refills: 0 | Status: SHIPPED | OUTPATIENT
Start: 2021-09-16 | End: 2021-10-08

## 2021-09-16 NOTE — TELEPHONE ENCOUNTER
Prescription was sent to the pharmacy for suboxone but not directions on how to take it.  Please resend prescription.  Patient is leaving town on Sunday.  Needs refill before the weekend.  Sisi Cuevas RN  [facility]

## 2021-09-16 NOTE — TELEPHONE ENCOUNTER
Patient called for appointment. Helped schedule visit for 10/8. Is wonder if there is any lab work she can complete before that time.   Please call back to follow up

## 2021-09-17 NOTE — TELEPHONE ENCOUNTER
Dr West    See pt message  There is an a1c, albumin random and bmp that were ordered last march  Are there others you wish, I cued a lipid    Shelli Live RN   [facility]

## 2021-09-20 NOTE — TELEPHONE ENCOUNTER
KYLER Reception - office visit for future refills please    Signed Prescriptions:                        Disp   Refills    buprenorphine-naloxone (SUBOXONE) 2-0.5 MG*28 tab*0        Sig: Place 1 tablet under the tongue 2 times daily Reduced           refill amt: needs in clinic appointment/labs  Authorizing Provider: GUERLINE PATE

## 2021-09-23 NOTE — TELEPHONE ENCOUNTER
Called pt and left detailed message to notify.    Mireya Ahuja RN  [facility]

## 2021-10-01 ENCOUNTER — LAB (OUTPATIENT)
Dept: LAB | Facility: CLINIC | Age: 66
End: 2021-10-01
Payer: MEDICARE

## 2021-10-01 DIAGNOSIS — E11.9 TYPE 2 DIABETES, HBA1C GOAL < 7% (H): ICD-10-CM

## 2021-10-01 DIAGNOSIS — E11.9 TYPE 2 DIABETES MELLITUS WITHOUT COMPLICATION, WITHOUT LONG-TERM CURRENT USE OF INSULIN (H): ICD-10-CM

## 2021-10-01 LAB
ANION GAP SERPL CALCULATED.3IONS-SCNC: 6 MMOL/L (ref 3–14)
BUN SERPL-MCNC: 21 MG/DL (ref 7–30)
CALCIUM SERPL-MCNC: 8.9 MG/DL (ref 8.5–10.1)
CHLORIDE BLD-SCNC: 111 MMOL/L (ref 94–109)
CHOLEST SERPL-MCNC: 128 MG/DL
CO2 SERPL-SCNC: 22 MMOL/L (ref 20–32)
CREAT SERPL-MCNC: 1.31 MG/DL (ref 0.52–1.04)
CREAT UR-MCNC: 135 MG/DL
FASTING STATUS PATIENT QL REPORTED: YES
GFR SERPL CREATININE-BSD FRML MDRD: 42 ML/MIN/1.73M2
GLUCOSE BLD-MCNC: 204 MG/DL (ref 70–99)
HBA1C MFR BLD: 5.3 % (ref 0–5.6)
HDLC SERPL-MCNC: 84 MG/DL
LDLC SERPL CALC-MCNC: 31 MG/DL
MICROALBUMIN UR-MCNC: 148 MG/L
MICROALBUMIN/CREAT UR: 109.63 MG/G CR (ref 0–25)
NONHDLC SERPL-MCNC: 44 MG/DL
POTASSIUM BLD-SCNC: 5.5 MMOL/L (ref 3.4–5.3)
SODIUM SERPL-SCNC: 139 MMOL/L (ref 133–144)
TRIGL SERPL-MCNC: 66 MG/DL

## 2021-10-01 PROCEDURE — 82043 UR ALBUMIN QUANTITATIVE: CPT

## 2021-10-01 PROCEDURE — 80061 LIPID PANEL: CPT

## 2021-10-01 PROCEDURE — 36415 COLL VENOUS BLD VENIPUNCTURE: CPT

## 2021-10-01 PROCEDURE — 83036 HEMOGLOBIN GLYCOSYLATED A1C: CPT

## 2021-10-01 PROCEDURE — 80048 BASIC METABOLIC PNL TOTAL CA: CPT

## 2021-10-04 ENCOUNTER — TELEPHONE (OUTPATIENT)
Dept: FAMILY MEDICINE | Facility: CLINIC | Age: 66
End: 2021-10-04

## 2021-10-04 NOTE — TELEPHONE ENCOUNTER
Patient calling for lab results  Albumin and kidney function off  Pt states she will review and upcoming virtual visit  Maricruz ELIAS RN

## 2021-10-08 ENCOUNTER — VIRTUAL VISIT (OUTPATIENT)
Dept: FAMILY MEDICINE | Facility: CLINIC | Age: 66
End: 2021-10-08
Payer: MEDICARE

## 2021-10-08 DIAGNOSIS — D69.9 BLEEDS EASILY (H): ICD-10-CM

## 2021-10-08 DIAGNOSIS — F10.939 ALCOHOL WITHDRAWAL SEIZURE WITH COMPLICATION (H): ICD-10-CM

## 2021-10-08 DIAGNOSIS — Z79.899 ENCOUNTER FOR MONITORING SUBOXONE MAINTENANCE THERAPY: Primary | ICD-10-CM

## 2021-10-08 DIAGNOSIS — R56.9 ALCOHOL WITHDRAWAL SEIZURE WITH COMPLICATION (H): ICD-10-CM

## 2021-10-08 DIAGNOSIS — B18.2 CHRONIC HEPATITIS C WITHOUT HEPATIC COMA (H): ICD-10-CM

## 2021-10-08 DIAGNOSIS — Z51.81 ENCOUNTER FOR MONITORING SUBOXONE MAINTENANCE THERAPY: Primary | ICD-10-CM

## 2021-10-08 PROBLEM — E11.9 TYPE 2 DIABETES MELLITUS WITHOUT COMPLICATION, WITHOUT LONG-TERM CURRENT USE OF INSULIN (H): Status: RESOLVED | Noted: 2021-03-02 | Resolved: 2021-10-08

## 2021-10-08 PROBLEM — R03.0 ELEVATED BLOOD PRESSURE READING WITHOUT DIAGNOSIS OF HYPERTENSION: Status: RESOLVED | Noted: 2021-03-02 | Resolved: 2021-10-08

## 2021-10-08 PROCEDURE — 99214 OFFICE O/P EST MOD 30 MIN: CPT | Mod: 95 | Performed by: FAMILY MEDICINE

## 2021-10-08 RX ORDER — BUPRENORPHINE HYDROCHLORIDE AND NALOXONE HYDROCHLORIDE DIHYDRATE 2; .5 MG/1; MG/1
1 TABLET SUBLINGUAL 2 TIMES DAILY
Qty: 60 TABLET | Refills: 0 | Status: SHIPPED | OUTPATIENT
Start: 2021-10-08 | End: 2021-11-12

## 2021-10-08 RX ORDER — LACTULOSE 10 G/15ML
10 SOLUTION ORAL; RECTAL 2 TIMES DAILY
Qty: 946 ML | Refills: 3 | Status: SHIPPED | OUTPATIENT
Start: 2021-10-08 | End: 2023-06-02

## 2021-10-08 ASSESSMENT — PATIENT HEALTH QUESTIONNAIRE - PHQ9: SUM OF ALL RESPONSES TO PHQ QUESTIONS 1-9: 9

## 2021-10-08 NOTE — PROGRESS NOTES
Josette is a 66 year old who is being evaluated via a billable video visit.      How would you like to obtain your AVS? MyChart  If the video visit is dropped, the invitation should be resent by: Text to cell phone: [phone]  Will anyone else be joining your video visit? No  Video Start Time: 01:14 pm     Assessment & Plan     Encounter for monitoring Suboxone maintenance therapy  stable  - [id number] - Urine Drug Confirmation Panel (Comprehensive); Future    Alcohol withdrawal seizure with complication (H)  History of  - lactulose encephalopathy (CHRONULAC) 10 GM/15ML SOLUTION; Take 15 mLs (10 g) by mouth 2 times daily    Chronic hepatitis C without hepatic coma (H)  Overdue to see hepatologist  - Ammonia; Future    Bleeds easily (H)  Capillary fragility versus liver disease  - INR; Future  - CBC with platelets; Future    Review of external notes as documented elsewhere in note  Ordering of each unique test  Prescription drug management  40 minutes spent on the date of the encounter doing chart review, history and exam, documentation and further activities per the note     Patient Instructions   Continuing same dose of Suboxone, not ready to taper    Recommend alcohol abstinence; may only be able to drink less for now    Continue current medication, follow-up in 3 months      Return in about 3 months (around 1/8/2022), or if symptoms worsen or fail to improve, for Routine Visit.    Ishaan West MD  [facility]    Subjective   Josette is a 66 year old who presents for the following health issues  accompanied by her spouse.    HPI     Suboxone maintainence FOLLOWUP     Taking medication/s regularly  With good efficacy, and without side effects   Without drug problems; however alcohol relapse, see below  Annual CSA, U-tox needed  Would like to continue current plan      How many days per week do you miss taking your medication? 0    Alcoholism relapse/chronic hepatitis C  Acute illness  concerns: Alcohol withdrawal seizure  Onset/Duration: Several months ago  Symptoms: Likely related to stopping regular alcohol intake, tolerance way down due to the liver disease    Therapies tried and outcome: Feels she can maintain her sobriety, spouse is concerned.  Not attending 12-step group like she used to.  Anika Covid.  Not computer savvy.  Willing to consider if she can stay sober.  Wants to wait till Covid is less pertinent before seeing liver doctor    Review of Systems   Constitutional, HEENT, cardiovascular, pulmonary, gi and gu systems are negative, except as otherwise noted.      Objective       Vitals:  No vitals were obtained today due to virtual visit.    Physical Exam   GENERAL: Healthy, alert and no distress  EYES: Eyes grossly normal to inspection.  No discharge or erythema, or obvious scleral/conjunctival abnormalities.  RESP: No audible wheeze, cough, or visible cyanosis.  No visible retractions or increased work of breathing.    SKIN: Visible skin clear. No significant rash, abnormal pigmentation or lesions.  NEURO: Cranial nerves grossly intact.  Mentation and speech appropriate for age.  PSYCH: Mentation appears normal, affect normal/bright, judgement and insight intact, normal speech and appearance well-groomed.        Video-Visit Details    Type of service:  Video Visit    Video End Time:  1:58 PM    Originating Location (pt. Location): Home    Distant Location (provider location):  [facility]     Platform used for Video Visit: [employer]

## 2021-10-08 NOTE — LETTER
Opioid / Opioid Plus Controlled Substance Agreement    This is an agreement between you and your provider about the safe and appropriate use of controlled substance/opioids prescribed by your care team. Controlled substances are medicines that can cause physical and mental dependence (abuse).    There are strict laws about having and using these medicines. We here at [facility] are committing to working with you in your efforts to get better. To support you in this work, we ll help you schedule regular office appointments for medicine refills. If we must cancel or change your appointment for any reason, we ll make sure you have enough medicine to last until your next appointment.     As a Provider, I will:    Listen carefully to your concerns and treat you with respect.     Recommend a treatment plan that I believe is in your best interest. This plan may involve therapies other than opioid pain medication.     Talk with you often about the possible benefits, and the risk of harm of any medicine that we prescribe for you.     Provide a plan on how to taper (discontinue or go off) using this medicine if the decision is made to stop its use.    As a Patient, I understand that opioid(s):     Are a controlled substance prescribed by my care team to help me function or work and manage my condition(s).     Are strong medicines and can cause serious side effects such as:    Drowsiness, which can seriously affect my driving ability    A lower breathing rate, enough to cause death    Harm to my thinking ability     Depression     Abuse of and addiction to this medicine    Need to be taken exactly as prescribed. Combining opioids with certain medicines or chemicals (such as illegal drugs, sedatives, sleeping pills, and benzodiazepines) can be dangerous or even fatal. If I stop opioids suddenly, I may have severe withdrawal symptoms.    Do not work for all types of pain nor for all patients. If they re not helpful, I may  be asked to stop them.        The risks, benefits and side effects of these medicine(s) were explained to me. I agree that:  1. I will take part in other treatments as advised by my care team. This may be psychiatry or counseling, physical therapy, behavioral therapy, group treatment or a referral to a specialist.     2. I will keep all my appointments. I understand that this is part of the monitoring of opioids. My care team may require an office visit for EVERY opioid/controlled substance refill. If I miss appointments or don t follow instructions, my care team may stop my medicine.    3. I will take my medicines as prescribed. I will not change the dose or schedule unless my care team tells me to. There will be no refills if I run out early.     4. I may be asked to come to the clinic and complete a urine drug test or complete a pill count at any time. If I don t give a urine sample or participate in a pill count, the care team may stop my medicine.    5. I will only receive prescriptions from this clinic for chronic pain. If I am treated by another provider for acute pain issues, I will tell them that I am taking opioid pain medication for chronic pain and that I have a treatment agreement with this provider. I will inform my [facility] care team within one business day if I am given a prescription for any pain medication by another healthcare provider. My [facility] care team can contact other providers and pharmacists about my use of any medicines.    6. It is up to me to make sure that I don t run out of my medicines on weekends or holidays. If my care team is willing to refill my opioid prescription without a visit, I must request refills only during office hours. Refills may take up to 3 business days to process. I will use one pharmacy to fill all my opioid and other controlled substance prescriptions. I will notify the clinic about any changes to my insurance or medication  availability.    7. I am responsible for my prescriptions. If the medicine/prescription is lost, stolen or destroyed, it will not be replaced. I also agree not to share controlled substance medicines with anyone.    8. I am aware I should not use any illegal or recreational drugs. I agree not to drink alcohol unless my care team says I can.       9. If I enroll in the [place] Medical Cannabis program, I will tell my care team prior to my next refill.     10. I will tell my care team right away if I become pregnant, have a new medical problem treated outside of my regular clinic, or have a change in my medications.    11. I understand that this medicine can affect my thinking, judgment and reaction time. Alcohol and drugs affect the brain and body, which can affect the safety of my driving. Being under the influence of alcohol or drugs can affect my decision-making, behaviors, personal safety, and the safety of others. Driving while impaired (DWI) can occur if a person is driving, operating, or in physical control of a car, motorcycle, boat, snowmobile, ATV, motorbike, off-road vehicle, or any other motor vehicle ([place] Statute 169A.20). I understand the risk if I choose to drive or operate any vehicle or machinery.    I understand that if I do not follow any of the conditions above, my prescriptions or treatment may be stopped or changed.          Opioids  What You Need to Know    What are opioids?   Opioids are pain medicines that must be prescribed by a doctor. They are also known as narcotics.     Examples are:   1. morphine (MS Contin, Cassi)  2. oxycodone (Oxycontin)  3. oxycodone and acetaminophen (Percocet)  4. hydrocodone and acetaminophen (Vicodin, Norco)   5. fentanyl patch (Duragesic)   6. hydromorphone (Dilaudid)   7. methadone  8. codeine (Tylenol #3)     What do opioids do well?   Opioids are best for severe short-term pain such as after a surgery or injury. They may work well for cancer pain. They may  help some people with long-lasting (chronic) pain.     What do opioids NOT do well?   Opioids never get rid of pain entirely, and they don t work well for most patients with chronic pain. Opioids don t reduce swelling, one of the causes of pain.                                    Other ways to manage chronic pain and improve function include:       Treat the health problem that may be causing pain    Anti-inflammation medicines, which reduce swelling and tenderness, such as ibuprofen (Advil, Motrin) or naproxen (Aleve)    Acetaminophen (Tylenol)    Antidepressants and anti-seizure medicines, especially for nerve pain    Topical treatments such as patches or creams    Injections or nerve blocks    Chiropractic or osteopathic treatment    Acupuncture, massage, deep breathing, meditation, visual imagery, aromatherapy    Use heat or ice at the pain site    Physical therapy     Exercise    Stop smoking    Take part in therapy       Risks and side effects     Talk to your doctor before you start or decide to keep taking opioids. Possible side effects include:      Lowering your breathing rate enough to cause death    Overdose, including death, especially if taking higher than prescribed doses    Worse depression symptoms; less pleasure in things you usually enjoy    Feeling tired or sluggish    Slower thoughts or cloudy thinking    Being more sensitive to pain over time; pain is harder to control    Trouble sleeping or restless sleep    Changes in hormone levels (for example, less testosterone)    Changes in sex drive or ability to have sex    Constipation    Unsafe driving    Itching and sweating    Dizziness    Nausea, throwing up and dry mouth    What else should I know about opioids?    Opioids may lead to dependence, tolerance, or addiction.      Dependence means that if you stop or reduce the medicine too quickly, you will have withdrawal symptoms. These include loose poop (diarrhea), jitters, flu-like symptoms,  nervousness and tremors. Dependence is not the same as addiction.                       Tolerance means needing higher doses over time to get the same effect. This may increase the chance of serious side effects.      Addiction is when people improperly use a substance that harms their body, their mind or their relations with others. Use of opiates can cause a relapse of addiction if you have a history of drug or alcohol abuse.      People who have used opioids for a long time may have a lower quality of life, worse depression, higher levels of pain and more visits to doctors.    You can overdose on opioids. Take these steps to lower your risk of overdose:    1. Recognize the signs:  Signs of overdose include decrease or loss of consciousness (blackout), slowed breathing, trouble waking up and blue lips. If someone is worried about overdose, they should call 911.    2. Talk to your doctor about Narcan (naloxone).   If you are at risk for overdose, you may be given a prescription for Narcan. This medicine very quickly reverses the effects of opioids.   If you overdose, a friend or family member can give you Narcan while waiting for the ambulance. They need to know the signs of overdose and how to give Narcan.     3. Don't use alcohol or street drugs.   Taking them with opioids can cause death.    4. Do not take any of these medicines unless your doctor says it s OK. Taking these with opioids can cause death:    Benzodiazepines, such as lorazepam (Ativan), alprazolam (Xanax) or diazepam (Valium)    Muscle relaxers, such as cyclobenzaprine (Flexeril)    Sleeping pills like zolpidem (Ambien)     Other opioids      How to keep you and other people safe while taking opioids:    1. Never share your opioids with others.  Opioid medicines are regulated by the Drug Enforcement Agency (SUGEY). Selling or sharing medications is a criminal act.    2. Be sure to store opioids in a secure place, locked up if possible. Young children  can easily swallow them and overdose.    3. When you are traveling with your medicines, keep them in the original bottles. If you use a pill box, be sure you also carry a copy of your medicine list from your clinic or pharmacy.    4. Safe disposal of opioids    Most pharmacies have places to get rid of medicine, called disposal kiosks. Medicine disposal options are also available in every [facility]. Search your county and  medication disposal  to find more options. You can find more details at:  [email or web address]     I agree that my provider, clinic care team, and pharmacy may work with any city, state or federal law enforcement agency that investigates the misuse, sale, or other diversion of my controlled medicine. I will allow my provider to discuss my care with, or share a copy of, this agreement with any other treating provider, pharmacy or emergency room where I receive care.    I have read this agreement and have asked questions about anything I did not understand.    _______________________________________________________  Patient Signature - Josette Palacios _____________________                   Date     _______________________________________________________  Provider Signature - Ishaan West MD   _____________________                   Date     _______________________________________________________  Witness Signature (required if provider not present while patient signing)   _____________________                   Date

## 2021-10-13 ENCOUNTER — LAB (OUTPATIENT)
Dept: LAB | Facility: CLINIC | Age: 66
End: 2021-10-13
Payer: MEDICARE

## 2021-10-13 DIAGNOSIS — Z79.899 ENCOUNTER FOR MONITORING SUBOXONE MAINTENANCE THERAPY: ICD-10-CM

## 2021-10-13 DIAGNOSIS — B18.2 CHRONIC HEPATITIS C WITHOUT HEPATIC COMA (H): ICD-10-CM

## 2021-10-13 DIAGNOSIS — Z51.81 ENCOUNTER FOR MONITORING SUBOXONE MAINTENANCE THERAPY: ICD-10-CM

## 2021-10-13 DIAGNOSIS — D69.9 BLEEDS EASILY (H): ICD-10-CM

## 2021-10-13 LAB
AMMONIA PLAS-SCNC: 25 UMOL/L (ref 10–50)
CREAT UR-MCNC: 70 MG/DL
ERYTHROCYTE [DISTWIDTH] IN BLOOD BY AUTOMATED COUNT: 14 % (ref 10–15)
HCT VFR BLD AUTO: 30.6 % (ref 35–47)
HGB BLD-MCNC: 9.7 G/DL (ref 11.7–15.7)
INR PPP: 1.18 (ref 0.85–1.15)
MCH RBC QN AUTO: 32.3 PG (ref 26.5–33)
MCHC RBC AUTO-ENTMCNC: 31.7 G/DL (ref 31.5–36.5)
MCV RBC AUTO: 102 FL (ref 78–100)
PLATELET # BLD AUTO: 117 10E3/UL (ref 150–450)
RBC # BLD AUTO: 3 10E6/UL (ref 3.8–5.2)
WBC # BLD AUTO: 4.6 10E3/UL (ref 4–11)

## 2021-10-13 PROCEDURE — 36415 COLL VENOUS BLD VENIPUNCTURE: CPT

## 2021-10-13 PROCEDURE — 85610 PROTHROMBIN TIME: CPT

## 2021-10-13 PROCEDURE — 82570 ASSAY OF URINE CREATININE: CPT | Mod: 59

## 2021-10-13 PROCEDURE — 85027 COMPLETE CBC AUTOMATED: CPT

## 2021-10-13 PROCEDURE — 80307 DRUG TEST PRSMV CHEM ANLYZR: CPT

## 2021-10-13 PROCEDURE — 82140 ASSAY OF AMMONIA: CPT

## 2021-10-15 LAB
BUPRENORPHINE UR CFM-MCNC: 227 NG/ML
BUPRENORPHINE/CREAT UR: 324 NG/MG {CREAT}
CREATININE URINE MG/DL  (SYNCED VALUE): 70 MG/DL
NALOXONE UR CFM-MCNC: 533 NG/ML
NALOXONE: 761 NG/MG {CREAT}
NORBUPRENORPHINE UR CFM-MCNC: 230 NG/ML
NORBUPRENORPHINE/CREAT UR: 329 NG/MG {CREAT}

## 2021-11-02 ENCOUNTER — MYC MEDICAL ADVICE (OUTPATIENT)
Dept: FAMILY MEDICINE | Facility: CLINIC | Age: 66
End: 2021-11-02
Payer: MEDICARE

## 2021-11-02 NOTE — LETTER
November 3, 2021      Josette Palacios  [address] [facility] [address]        To Whom It May Concern,         Josette Palacios is currently under my general medical care. She is unsuitable for jury duty     at this time due to chronic medical conditions, illness and treatments.  Please excuse from jury     duty indefinitely.       Sincerely,          Ishaan West MD

## 2021-11-03 NOTE — TELEPHONE ENCOUNTER
,    Please see [employer] message.   Letter cued if agree. Please check letter and revise as needed.     Thanks,  RALEIGH Lucas  [facility]

## 2021-11-11 DIAGNOSIS — Z51.81 ENCOUNTER FOR MONITORING SUBOXONE MAINTENANCE THERAPY: ICD-10-CM

## 2021-11-11 DIAGNOSIS — Z79.899 ENCOUNTER FOR MONITORING SUBOXONE MAINTENANCE THERAPY: ICD-10-CM

## 2021-11-12 RX ORDER — BUPRENORPHINE HYDROCHLORIDE AND NALOXONE HYDROCHLORIDE DIHYDRATE 2; .5 MG/1; MG/1
TABLET SUBLINGUAL
Qty: 60 TABLET | Refills: 0 | Status: SHIPPED | OUTPATIENT
Start: 2021-11-12 | End: 2021-12-22

## 2021-11-12 NOTE — TELEPHONE ENCOUNTER
Requested Prescriptions   Pending Prescriptions Disp Refills     buprenorphine-naloxone (SUBOXONE) 2-0.5 MG SUBL sublingual tablet [Pharmacy Med Name: BUPRENORPHINE/NALOX 2MG/0.5MG SL TB] 60 tablet      Sig: PLACE 1 TABLET UNDER THE TONGUE TWICE DAILY       There is no refill protocol information for this order        Routing refill request to provider for review/approval because:  Drug not on the [facility] refill protocol     Shayy Wilson RN  [facility]

## 2021-12-07 NOTE — PATIENT INSTRUCTIONS
Continuing same dose of Suboxone, not ready to taper    Recommend alcohol abstinence; may only be able to drink less for now    Continue current medication, follow-up in 3 months

## 2022-01-05 ENCOUNTER — TELEPHONE (OUTPATIENT)
Dept: FAMILY MEDICINE | Facility: CLINIC | Age: 67
End: 2022-01-05
Payer: MEDICARE

## 2022-01-05 NOTE — TELEPHONE ENCOUNTER
Hi Dr West,    Patient's spouse called regarding a hospital visit. Pt is scheduled to Follow up with Dr Gibbs on 01/07.  Pt's spouse is very concerned and has multiple questions including medication questions. I offered to schedule an appointment. There is a slot in your schedule that requires your approval.   May we have your approval to schedule pt for 01/10/22 at 1140a?     Thank you for considering!    Carlos Palacios [phone]

## 2022-01-10 ENCOUNTER — OFFICE VISIT (OUTPATIENT)
Dept: FAMILY MEDICINE | Facility: CLINIC | Age: 67
End: 2022-01-10
Payer: MEDICARE

## 2022-01-10 ENCOUNTER — TELEPHONE (OUTPATIENT)
Dept: FAMILY MEDICINE | Facility: CLINIC | Age: 67
End: 2022-01-10

## 2022-01-10 VITALS
TEMPERATURE: 98.4 F | WEIGHT: 140 LBS | OXYGEN SATURATION: 98 % | DIASTOLIC BLOOD PRESSURE: 67 MMHG | SYSTOLIC BLOOD PRESSURE: 137 MMHG | BODY MASS INDEX: 22.5 KG/M2 | HEIGHT: 66 IN | HEART RATE: 64 BPM

## 2022-01-10 DIAGNOSIS — K70.31 ALCOHOLIC CIRRHOSIS OF LIVER WITH ASCITES (H): ICD-10-CM

## 2022-01-10 DIAGNOSIS — D69.9 BLEEDS EASILY (H): ICD-10-CM

## 2022-01-10 DIAGNOSIS — Z51.81 ENCOUNTER FOR MONITORING SUBOXONE MAINTENANCE THERAPY: Primary | ICD-10-CM

## 2022-01-10 DIAGNOSIS — Z79.899 ENCOUNTER FOR MONITORING SUBOXONE MAINTENANCE THERAPY: Primary | ICD-10-CM

## 2022-01-10 DIAGNOSIS — Z23 NEED FOR PROPHYLACTIC VACCINATION AND INOCULATION AGAINST INFLUENZA: ICD-10-CM

## 2022-01-10 DIAGNOSIS — E08.22 DIABETES MELLITUS DUE TO UNDERLYING CONDITION WITH STAGE 3 CHRONIC KIDNEY DISEASE, UNSPECIFIED WHETHER LONG TERM INSULIN USE, UNSPECIFIED WHETHER STAGE 3A OR 3B CKD (H): ICD-10-CM

## 2022-01-10 DIAGNOSIS — N18.30 DIABETES MELLITUS DUE TO UNDERLYING CONDITION WITH STAGE 3 CHRONIC KIDNEY DISEASE, UNSPECIFIED WHETHER LONG TERM INSULIN USE, UNSPECIFIED WHETHER STAGE 3A OR 3B CKD (H): ICD-10-CM

## 2022-01-10 PROBLEM — M86.162: Status: ACTIVE | Noted: 2022-01-10

## 2022-01-10 PROBLEM — M86.162: Status: RESOLVED | Noted: 2022-01-10 | Resolved: 2022-01-10

## 2022-01-10 LAB
AMMONIA PLAS-SCNC: 29 UMOL/L (ref 10–50)
ERYTHROCYTE [DISTWIDTH] IN BLOOD BY AUTOMATED COUNT: 15.7 % (ref 10–15)
HBA1C MFR BLD: 5.8 % (ref 0–5.6)
HCT VFR BLD AUTO: 27.6 % (ref 35–47)
HGB BLD-MCNC: 8.9 G/DL (ref 11.7–15.7)
HOLD SPECIMEN: NORMAL
INR PPP: 1.23 (ref 0.85–1.15)
MCH RBC QN AUTO: 30.9 PG (ref 26.5–33)
MCHC RBC AUTO-ENTMCNC: 32.2 G/DL (ref 31.5–36.5)
MCV RBC AUTO: 96 FL (ref 78–100)
PLATELET # BLD AUTO: 283 10E3/UL (ref 150–450)
RBC # BLD AUTO: 2.88 10E6/UL (ref 3.8–5.2)
WBC # BLD AUTO: 8.1 10E3/UL (ref 4–11)

## 2022-01-10 PROCEDURE — 85027 COMPLETE CBC AUTOMATED: CPT | Performed by: FAMILY MEDICINE

## 2022-01-10 PROCEDURE — 82043 UR ALBUMIN QUANTITATIVE: CPT | Performed by: FAMILY MEDICINE

## 2022-01-10 PROCEDURE — 99215 OFFICE O/P EST HI 40 MIN: CPT | Performed by: FAMILY MEDICINE

## 2022-01-10 PROCEDURE — 83036 HEMOGLOBIN GLYCOSYLATED A1C: CPT | Performed by: FAMILY MEDICINE

## 2022-01-10 PROCEDURE — 80053 COMPREHEN METABOLIC PANEL: CPT | Performed by: FAMILY MEDICINE

## 2022-01-10 PROCEDURE — 85610 PROTHROMBIN TIME: CPT | Performed by: FAMILY MEDICINE

## 2022-01-10 PROCEDURE — 87522 HEPATITIS C REVRS TRNSCRPJ: CPT | Performed by: FAMILY MEDICINE

## 2022-01-10 PROCEDURE — 82140 ASSAY OF AMMONIA: CPT | Performed by: FAMILY MEDICINE

## 2022-01-10 PROCEDURE — 36415 COLL VENOUS BLD VENIPUNCTURE: CPT | Performed by: FAMILY MEDICINE

## 2022-01-10 RX ORDER — TRAZODONE HYDROCHLORIDE 50 MG/1
TABLET, FILM COATED ORAL
Qty: 90 TABLET | Refills: 1 | Status: SHIPPED | OUTPATIENT
Start: 2022-01-10 | End: 2023-06-02

## 2022-01-10 RX ORDER — PRAMIPEXOLE DIHYDROCHLORIDE 0.12 MG/1
TABLET ORAL
Qty: 90 TABLET | Refills: 3 | Status: SHIPPED | OUTPATIENT
Start: 2022-01-10 | End: 2022-04-20

## 2022-01-10 ASSESSMENT — MIFFLIN-ST. JEOR: SCORE: 1191.79

## 2022-01-10 NOTE — TELEPHONE ENCOUNTER
Reason for Call: Request for an order or referral:    Order or referral being requested: Care Orders    Date needed: Wednesday 1/12/22    Has the patient been seen by the PCP for this problem? YES    Additional comments: [facility] would like a delay in start of care until Wednesday 1/12/22. They can't get out to see pt until then.    Phone number Patient can be reached at:  Other phone number:  [phone]    Best Time:  Anytime    Can we leave a detailed message on this number?  NO    Call taken on 1/10/2022 at 5:05 PM by Geeta Sandra

## 2022-01-10 NOTE — PROGRESS NOTES
Assessment & Plan     Encounter for monitoring Suboxone maintenance therapy  For opioid dependence, stable  - Extra Tube; Future  - Extra Tube    Alcoholic cirrhosis of liver with ascites (H)  Sober now after relapse.  Chronic hepatitis C.  - Comprehensive metabolic panel (BMP + Alb, Alk Phos, ALT, AST, Total. Bili, TP); Future  - CBC with platelets; Future  - INR; Future  - Ammonia; Future  - Home Care Nursing Referral  - Hepatitis C RNA quantitative; Future  - Comprehensive metabolic panel (BMP + Alb, Alk Phos, ALT, AST, Total. 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    Need for prophylactic vaccination and inoculation against influenza  - ADMIN INFLUENZA  (For MEDICARE Patients ONLY) []    Review of external notes as documented elsewhere in note  Ordering of each unique test  Prescription drug management  45 minutes spent on the date of the encounter doing chart review, history and exam, documentation and further activities per the note     Patient Instructions   Follow-up in 4 weeks to reassess sobriety    Continue Suboxone maintenance      Return in about 4 weeks (around 2/7/2022), or if symptoms worsen or fail to improve.    Ishaan West MD  [facility]    Shreya Finch is a 66 year old who presents for the following health issues  accompanied by her spouse.    [facility]       Hospital Follow-up Visit:    Hospital/Nursing Home/ Rehab Facility: [facility]  Date of Admission: 12/28/2021  Date of Discharge: 01/04/2022  Reason(s) for Admission: GI bleed      Was your hospitalization related to COVID-19? No   Problems taking medications regularly:   "None  Medication changes since discharge:   Problems adhering to non-medication therapy:  None    Summary of hospitalization:  CareEverywhere information obtained and reviewed  Diagnostic Tests/Treatments reviewed.  Follow up needed: none  Other Healthcare Providers Involved in Patient s Care:         None  Update since discharge: improved. Post Discharge Medication Reconciliation: discharge medications reconciled, continue medications without change.  Plan of care communicated with patient and family        Medication Followup of suboxone    Taking Medication as prescribed: yes    Side Effects:  None    Medication Helping Symptoms:  yes     Chronic liver disease-cirrhosis, ascites/alcoholism and chronic hepatitis C     Relapse with aspiration pneumonia with Pseudomonas intubated several days successfully weaned to room air    Review of Systems   Constitutional, HEENT, cardiovascular, pulmonary, gi and gu systems are negative, except as otherwise noted.      Objective    /67 (BP Location: Left arm, Patient Position: Sitting, Cuff Size: Adult Regular)   Pulse 64   Temp 98.4  F (36.9  C) (Temporal)   Ht 1.676 m (5' 6\")   Wt 63.5 kg (140 lb)   LMP  (LMP Unknown)   SpO2 98%   BMI 22.60 kg/m    Body mass index is 22.6 kg/m .  Physical Exam   GENERAL: no distress, frail and fatigued  RESP: lungs clear to auscultation - no rales, rhonchi or wheezes  CV: regular rate and rhythm, normal S1 S2, no S3 or S4, no murmur, click or rub, no peripheral edema and peripheral pulses strong  ABDOMINAL: Enlarged liver with protuberant abdomen and fluid wave  MS: muscle wasting extremities and 1+ edema to knees  SKIN: no jaundice and ecchymoses - arms and hands  NEURO: weakness generalized, sensory exam grossly normal and abnormal mental status -seems slightly in a daze- helps as her short-term memory is poor especially for hospitalization  PSYCH: inattentive, affect flat, fatigued, judgement and insight impaired and " appearance well groomed

## 2022-01-11 LAB
ALBUMIN SERPL-MCNC: 2.8 G/DL (ref 3.4–5)
ALP SERPL-CCNC: 104 U/L (ref 40–150)
ALT SERPL W P-5'-P-CCNC: 44 U/L (ref 0–50)
ANION GAP SERPL CALCULATED.3IONS-SCNC: 5 MMOL/L (ref 3–14)
AST SERPL W P-5'-P-CCNC: 40 U/L (ref 0–45)
BILIRUB SERPL-MCNC: 0.8 MG/DL (ref 0.2–1.3)
BUN SERPL-MCNC: 18 MG/DL (ref 7–30)
CALCIUM SERPL-MCNC: 8.6 MG/DL (ref 8.5–10.1)
CHLORIDE BLD-SCNC: 114 MMOL/L (ref 94–109)
CO2 SERPL-SCNC: 20 MMOL/L (ref 20–32)
CREAT SERPL-MCNC: 1.3 MG/DL (ref 0.52–1.04)
CREAT UR-MCNC: 218 MG/DL
GFR SERPL CREATININE-BSD FRML MDRD: 45 ML/MIN/1.73M2
GLUCOSE BLD-MCNC: 136 MG/DL (ref 70–99)
MICROALBUMIN UR-MCNC: 303 MG/L
MICROALBUMIN/CREAT UR: 138.99 MG/G CR (ref 0–25)
POTASSIUM BLD-SCNC: 4.6 MMOL/L (ref 3.4–5.3)
PROT SERPL-MCNC: 7.3 G/DL (ref 6.8–8.8)
SODIUM SERPL-SCNC: 139 MMOL/L (ref 133–144)

## 2022-01-12 ENCOUNTER — TELEPHONE (OUTPATIENT)
Dept: FAMILY MEDICINE | Facility: CLINIC | Age: 67
End: 2022-01-12
Payer: MEDICARE

## 2022-01-12 ENCOUNTER — MEDICAL CORRESPONDENCE (OUTPATIENT)
Dept: HEALTH INFORMATION MANAGEMENT | Facility: CLINIC | Age: 67
End: 2022-01-12
Payer: MEDICARE

## 2022-01-12 NOTE — TELEPHONE ENCOUNTER
Reason for Call: Request for an order or referral:    Order or referral being requested:   Orders for continuing Home Physical Therapy  Referral for medical social worker    Date needed: as soon as possible    Has the patient been seen by the PCP for this problem? YES    Additional comments:   1 times a week for 3 weeks  1 time a week every other week    Phone number Patient can be reached at:  Other phone number:    Mariel   [phone]    Best Time:  Anytime     Can we leave a detailed message on this number?  YES    Call taken on 1/12/2022 at 3:38 PM by Siva Serrano MA

## 2022-01-13 LAB — HCV RNA SERPL NAA+PROBE-ACNC: NOT DETECTED IU/ML

## 2022-01-13 NOTE — TELEPHONE ENCOUNTER
Spoke with Heidi STOREY, gave verbal ok for below.    Thanks,  RALEIGH Lucas  [facility]

## 2022-01-14 NOTE — RESULT ENCOUNTER NOTE
Haroldo Nathan: labs:    -prediabetic, not diabetes    - ammonia normal indicating immediate alcohol liver injury better due to no alcohol    - your bleeding time is still too long due to persistent serious long term liver damage from drinking alcohol    -long term moderate kidney damage stable    -significant anemia due chronic kidney damage    Schedule soon a 3 month followup appointment to assess sobriety, long term liver, kidney damage and anemia.     Contact if questions     Ishaan

## 2022-01-16 PROBLEM — E44.0 MODERATE MALNUTRITION (H): Status: ACTIVE | Noted: 2022-01-16

## 2022-01-16 PROBLEM — E44.0 MODERATE MALNUTRITION (H): Status: RESOLVED | Noted: 2022-01-16 | Resolved: 2022-01-16

## 2022-01-16 PROBLEM — R56.9 ALCOHOL WITHDRAWAL SEIZURE WITH COMPLICATION (H): Status: ACTIVE | Noted: 2022-01-16

## 2022-01-16 PROBLEM — J96.90 RESPIRATORY FAILURE (H): Status: ACTIVE | Noted: 2019-06-05

## 2022-01-16 PROBLEM — J96.90 RESPIRATORY FAILURE (H): Status: RESOLVED | Noted: 2019-06-05 | Resolved: 2022-01-16

## 2022-01-16 PROBLEM — F10.939 ALCOHOL WITHDRAWAL SEIZURE WITH COMPLICATION (H): Status: RESOLVED | Noted: 2022-01-16 | Resolved: 2022-01-16

## 2022-01-16 PROBLEM — R56.9 ALCOHOL WITHDRAWAL SEIZURE WITH COMPLICATION (H): Status: RESOLVED | Noted: 2022-01-16 | Resolved: 2022-01-16

## 2022-01-16 PROBLEM — F10.939 ALCOHOL WITHDRAWAL SEIZURE WITH COMPLICATION (H): Status: ACTIVE | Noted: 2022-01-16

## 2022-01-18 ENCOUNTER — TELEPHONE (OUTPATIENT)
Dept: FAMILY MEDICINE | Facility: CLINIC | Age: 67
End: 2022-01-18
Payer: MEDICARE

## 2022-01-18 NOTE — TELEPHONE ENCOUNTER
Reason for Call: Request for an order or referral:    Order or referral being requested: Verbal Orders    Date needed: as soon as possible    Has the patient been seen by the PCP for this problem? YES    Additional comments: Skilled Nursing   1 visit a week for 3 weeks     Phone number Patient can be reached at:  Other phone number:  [phone]    Best Time:  Anytime     Can we leave a detailed message on this number?  YES    Call taken on 1/18/2022 at 11:44 AM by Siva Serrano MA

## 2022-01-18 NOTE — TELEPHONE ENCOUNTER
Spoke with Jeimy and gave verbal ok for orders below.     Thanks,  RALEIGH Lucas  [facility]

## 2022-01-19 ENCOUNTER — MEDICAL CORRESPONDENCE (OUTPATIENT)
Dept: HEALTH INFORMATION MANAGEMENT | Facility: CLINIC | Age: 67
End: 2022-01-19
Payer: MEDICARE

## 2022-01-19 ENCOUNTER — TELEPHONE (OUTPATIENT)
Dept: FAMILY MEDICINE | Facility: CLINIC | Age: 67
End: 2022-01-19
Payer: MEDICARE

## 2022-01-19 DIAGNOSIS — Z53.9 DIAGNOSIS NOT YET DEFINED: Primary | ICD-10-CM

## 2022-01-19 PROCEDURE — G0180 MD CERTIFICATION HHA PATIENT: HCPCS | Performed by: FAMILY MEDICINE

## 2022-01-19 NOTE — TELEPHONE ENCOUNTER
Reason for Call:  Form, our goal is to have forms completed with 72 hours, however, some forms may require a visit or additional information.    Type of letter, form or note:  Home Health Certifcation and Plan of Care    Who is the form from?: [facility] (if other please explain)    Where did the form come from: form was faxed in    What clinic location was the form placed at?: [facility]    Where the form was placed: Jenna Box/Folder    What number is listed as a contact on the form?:   [phone]  Fax: [phone]       Additional comments: Please sign, date and return    Call taken on 1/19/2022 at 1:40 PM by Siva Serrano MA

## 2022-01-20 NOTE — TELEPHONE ENCOUNTER
[facility] Health Certification and Plan of care  01/12/2022-03/12/2022  [phone]  Faxed to:[phone]  [employer]: [phone]

## 2022-01-21 ENCOUNTER — MEDICAL CORRESPONDENCE (OUTPATIENT)
Dept: HEALTH INFORMATION MANAGEMENT | Facility: CLINIC | Age: 67
End: 2022-01-21
Payer: MEDICARE

## 2022-01-24 ENCOUNTER — MEDICAL CORRESPONDENCE (OUTPATIENT)
Dept: HEALTH INFORMATION MANAGEMENT | Facility: CLINIC | Age: 67
End: 2022-01-24
Payer: MEDICARE

## 2022-01-24 ENCOUNTER — TELEPHONE (OUTPATIENT)
Dept: FAMILY MEDICINE | Facility: CLINIC | Age: 67
End: 2022-01-24
Payer: MEDICARE

## 2022-01-24 NOTE — TELEPHONE ENCOUNTER
Reason for Call:  Form, our goal is to have forms completed with 72 hours, however, some forms may require a visit or additional information.    Type of letter, form or note:  Order [id number]    Who is the form from?: [facility] (if other please explain)    Where did the form come from: form was faxed in    What clinic location was the form placed at?: [facility]    Where the form was placed: Jenna Box/Folder    What number is listed as a contact on the form?:   [phone]  Fax: [phone]       Additional comments: Urgent, Please sign, date, return    Call taken on 1/24/2022 at 2:01 PM by Siva Serrano MA

## 2022-01-26 ENCOUNTER — TELEPHONE (OUTPATIENT)
Dept: FAMILY MEDICINE | Facility: CLINIC | Age: 67
End: 2022-01-26
Payer: MEDICARE

## 2022-01-26 ENCOUNTER — MEDICAL CORRESPONDENCE (OUTPATIENT)
Dept: HEALTH INFORMATION MANAGEMENT | Facility: CLINIC | Age: 67
End: 2022-01-26
Payer: MEDICARE

## 2022-01-26 NOTE — TELEPHONE ENCOUNTER
Per home care nurse, pt is requesting clarification for the following.    What values blood sugar should be at and does pt need any follow up labs. Home care nurse is present and able to get samples.    [facility]   Jeimy STOREY  [phone]

## 2022-01-28 NOTE — TELEPHONE ENCOUNTER
Called and left detailed message for home care RN to notify    Mireya Ahuja RN  [facility]

## 2022-01-28 NOTE — TELEPHONE ENCOUNTER
No need to check home blood sugars, as type 2 diabetes, diet controlled. Last A1c 5.8  No missing labs right now.  Please notify, thanks Ishaan

## 2022-02-02 ENCOUNTER — MEDICAL CORRESPONDENCE (OUTPATIENT)
Dept: HEALTH INFORMATION MANAGEMENT | Facility: CLINIC | Age: 67
End: 2022-02-02
Payer: MEDICARE

## 2022-02-03 ENCOUNTER — TELEPHONE (OUTPATIENT)
Dept: FAMILY MEDICINE | Facility: CLINIC | Age: 67
End: 2022-02-03
Payer: MEDICARE

## 2022-02-03 DIAGNOSIS — N95.1 SYMPTOMATIC MENOPAUSAL OR FEMALE CLIMACTERIC STATES: Primary | ICD-10-CM

## 2022-02-03 DIAGNOSIS — N95.8 OTHER SPECIFIED MENOPAUSAL AND PERIMENOPAUSAL DISORDERS: ICD-10-CM

## 2022-02-03 DIAGNOSIS — E55.9 VITAMIN D DEFICIENCY, UNSPECIFIED: Primary | ICD-10-CM

## 2022-02-03 NOTE — TELEPHONE ENCOUNTER
Dr. West,       Name of the medication requested: amLODIPine (NORVASC) 5 mg oral tablet Take 1 tablet (5 mg) by mouth once daily for 30 days.  Qty: 30 tablet, Refills: 0      Other request: Patient has been taking her calcium and is wondering if she can stop taking it or does she need to continue. If she stops taking then how much vitmain D does she need to take daily.      Patient was put on pantoprazole (PROTONIX) 40 mg oral delayed release tablet Take 1 tablet (40 mg) by mouth twice a day for 30 days Indications: GI bleed.  Qty: 60 tablet, Refills: 0. Wondering if need to stop taking all together or take once daily.    I do not see amlodipine in patient chart and patient called earlier so there is a separate encounter regarding the mauricio D.     Thanks,  RALEIGH Lucas  [facility]

## 2022-02-03 NOTE — TELEPHONE ENCOUNTER
Reason for Call:  Medication or medication refill:    Do you use a [facility]?  Name of the pharmacy and phone number for the current request:  [id number] - [facility] [place] [facility], [place] - [address] AT [address]    Name of the medication requested: amLODIPine (NORVASC) 5 mg oral tablet Take 1 tablet (5 mg) by mouth once daily for 30 days.  Qty: 30 tablet, Refills: 0     Other request: Patient has been taking her calcium and is wondering if she can stop taking it or does she need to continue. If she stops taking then how much vitmain D does she need to take daily.     Patient was put on pantoprazole (PROTONIX) 40 mg oral delayed release tablet Take 1 tablet (40 mg) by mouth twice a day for 30 days Indications: GI bleed.  Qty: 60 tablet, Refills: 0. Wondering if need to stop taking all together or take once daily.    Can we leave a detailed message on this number? YES    Phone number patient can be reached at: Home number on file [phone] (home)    Best Time: any    Call taken on 2/3/2022 at 2:15 PM by Clotilde Valentine

## 2022-02-03 NOTE — TELEPHONE ENCOUNTER
Reason for Call: Request for an order or referral:    Order or referral being requested: one addition skilled nursing visit week of February 14th, 2022    Date needed: as soon as possible    Has the patient been seen by the PCP for this problem? YES    Additional comments: verbal is okay    Phone number Patient can be reached at:  Other phone number:  [phone]*    Best Time:  any    Can we leave a detailed message on this number?  YES    Call taken on 2/3/2022 at 2:20 PM by Clotilde Valentine

## 2022-02-03 NOTE — TELEPHONE ENCOUNTER
Patients  called requesting refills.    Would also like clarification on dosing of vitamin D and if patient needs such a high dose (do not see on current medication list)      Please send to [facility] #[id number] - [place], [place] - [address]

## 2022-02-03 NOTE — TELEPHONE ENCOUNTER
Dr. West,    I did some searching in patient's chart and the last time she had vit D ordered was back in 2017 for the 50,000 Units. Wouldn't she need to have her levels rechecked before ordering this again?    Thanks,  RALEIGH Lucas  [facility]

## 2022-02-04 DIAGNOSIS — I10 HYPERTENSION, BENIGN ESSENTIAL, GOAL BELOW 140/90: Primary | ICD-10-CM

## 2022-02-04 RX ORDER — AMLODIPINE BESYLATE 5 MG/1
5 TABLET ORAL DAILY
Qty: 90 TABLET | Refills: 1 | Status: SHIPPED | OUTPATIENT
Start: 2022-02-04 | End: 2022-08-30

## 2022-02-04 NOTE — TELEPHONE ENCOUNTER
Dr. West,    Patient started this medication during hospital stay on 12/28/21. Here is the note I found:          Ok to refill? Pt requesting call on home phone once med is sent to pharm. Ok for detail vm.    Thanks,  RALEIGH Lucas  [facility]

## 2022-02-04 NOTE — TELEPHONE ENCOUNTER
Attempted to call, left vm stating med signed and sent to pharm.    Thanks,  RALEIGH Lucas  [facility]

## 2022-02-04 NOTE — TELEPHONE ENCOUNTER
Attempted to call RN, left  with verbal ok.     Thanks,  RALEIGH Lucas  [facility]

## 2022-02-05 NOTE — TELEPHONE ENCOUNTER
Recommend:     Protonix 40 mg once daily,  Continue calcium/vit D twice daily  Schedule Dexa scan for bone density  And schedule nonfasting lab only for vit D level    Please notify, thanks Ishaan

## 2022-02-07 ENCOUNTER — TELEPHONE (OUTPATIENT)
Dept: FAMILY MEDICINE | Facility: CLINIC | Age: 67
End: 2022-02-07
Payer: MEDICARE

## 2022-02-07 NOTE — TELEPHONE ENCOUNTER
Spoke with pt spouse, notified of below. Spouse agreed to plan below but stated that pt had dexa done at [facility] about few years ago so he will look for this record to sent to .     Thanks,  RALEIGH Lucas  [facility]

## 2022-02-09 ENCOUNTER — MEDICAL CORRESPONDENCE (OUTPATIENT)
Dept: HEALTH INFORMATION MANAGEMENT | Facility: CLINIC | Age: 67
End: 2022-02-09
Payer: MEDICARE

## 2022-02-11 DIAGNOSIS — Z51.81 ENCOUNTER FOR MONITORING SUBOXONE MAINTENANCE THERAPY: ICD-10-CM

## 2022-02-11 DIAGNOSIS — Z79.899 ENCOUNTER FOR MONITORING SUBOXONE MAINTENANCE THERAPY: ICD-10-CM

## 2022-02-11 RX ORDER — BUPRENORPHINE HYDROCHLORIDE AND NALOXONE HYDROCHLORIDE DIHYDRATE 2; .5 MG/1; MG/1
TABLET SUBLINGUAL
Qty: 60 TABLET | Refills: 0 | Status: SHIPPED | OUTPATIENT
Start: 2022-02-11 | End: 2022-03-14

## 2022-02-11 NOTE — TELEPHONE ENCOUNTER
Requested Prescriptions   Pending Prescriptions Disp Refills     buprenorphine-naloxone (SUBOXONE) 2-0.5 MG SUBL sublingual tablet [Pharmacy Med Name: BUPRENORPHINE/NALOX 2MG/0.5MG SL TB] 60 tablet      Sig: DISSOLVE 1 TABLET UNDER THE TONGUE TWICE DAILY       There is no refill protocol information for this order        Routing refill request to provider for review/approval because:  Drug not on the G refill protocol     Shayy Wilson RN  [facility]

## 2022-02-18 ENCOUNTER — LAB (OUTPATIENT)
Dept: LAB | Facility: CLINIC | Age: 67
End: 2022-02-18
Payer: MEDICARE

## 2022-02-18 DIAGNOSIS — E55.9 VITAMIN D DEFICIENCY, UNSPECIFIED: ICD-10-CM

## 2022-02-18 PROCEDURE — 82306 VITAMIN D 25 HYDROXY: CPT

## 2022-02-18 PROCEDURE — 36415 COLL VENOUS BLD VENIPUNCTURE: CPT

## 2022-02-19 LAB — DEPRECATED CALCIDIOL+CALCIFEROL SERPL-MC: 23 UG/L (ref 20–75)

## 2022-02-21 ENCOUNTER — TELEPHONE (OUTPATIENT)
Dept: FAMILY MEDICINE | Facility: CLINIC | Age: 67
End: 2022-02-21
Payer: MEDICARE

## 2022-02-21 NOTE — TELEPHONE ENCOUNTER
2 skilled nursing visit  1x/week for 2 weeks    Update-  Saw for the last 2 weeks, when discharged form hospital  Patient was taking  cloNIDine HCL (CATAPRES) 0.1 mg(? Please verify medication) oral tablet  for 1 month    /80, 154/78, with in normal limits.  Since stopping. BP's running high.    Suggests increaseing amlodipine   Okay to leave detailed message

## 2022-02-24 ENCOUNTER — TELEPHONE (OUTPATIENT)
Dept: FAMILY MEDICINE | Facility: CLINIC | Age: 67
End: 2022-02-24
Payer: MEDICARE

## 2022-02-24 DIAGNOSIS — I10 HYPERTENSION, BENIGN ESSENTIAL, GOAL BELOW 140/90: Primary | ICD-10-CM

## 2022-02-24 DIAGNOSIS — F11.93 OPIATE WITHDRAWAL (H): ICD-10-CM

## 2022-02-24 RX ORDER — CLONIDINE HYDROCHLORIDE 0.1 MG/1
0.1 TABLET ORAL 2 TIMES DAILY
Qty: 180 TABLET | Refills: 3 | Status: SHIPPED | OUTPATIENT
Start: 2022-02-24 | End: 2023-05-01

## 2022-02-24 NOTE — TELEPHONE ENCOUNTER
Dr. West,    Please see encounter 2/21. Nursing needs verbals and see updates on patient's health.     Thanks,  RALEIGH Lucas  [facility]

## 2022-02-25 NOTE — RESULT ENCOUNTER NOTE
Haroldo Finch, your recent results are back and are all normal. Please contact if any questions.   Ishaan West MD

## 2022-02-25 NOTE — TELEPHONE ENCOUNTER
Jeimy SOLITARIO calling to f/u on below message  Wants to know about next step for high BPs  Maricruz ELIAS RN

## 2022-02-26 NOTE — TELEPHONE ENCOUNTER
Please check which blood pressure medications is patient actually taking? Please check with spouse Sid, as he helps with medications and knows if actually taking or not    -amlodipine 5 mg every morning?  -clonidine 0.1 mg twice daily? Or only once daily?    Thanks, Ishaan

## 2022-02-28 ENCOUNTER — TELEPHONE (OUTPATIENT)
Dept: FAMILY MEDICINE | Facility: CLINIC | Age: 67
End: 2022-02-28
Payer: MEDICARE

## 2022-02-28 NOTE — TELEPHONE ENCOUNTER
----- Message from Ishaan West MD sent at 2/25/2022  1:00 PM CST -----  Rather than increase amlodipine for elevated blood pressure right now, please recheck blood pressures after restarting clonidine 0.1 mg twice daily.   Please notify Home Care.  Thanks Ishaan

## 2022-02-28 NOTE — TELEPHONE ENCOUNTER
Dr. West,    Spoke with Carlos. Per Carlos pt taking amlodipine daily. Will start clonidine as she has not been taking this. RN was notified of plan as well.     Thanks,  RALEIGH Lucas  [facility]

## 2022-02-28 NOTE — TELEPHONE ENCOUNTER
Attempted to call Jeimy STOREY, left  with below.     Thanks,  RALEIGH Lucas  [facility]

## 2022-03-02 ENCOUNTER — MEDICAL CORRESPONDENCE (OUTPATIENT)
Dept: HEALTH INFORMATION MANAGEMENT | Facility: CLINIC | Age: 67
End: 2022-03-02
Payer: MEDICARE

## 2022-03-10 ENCOUNTER — DOCUMENTATION ONLY (OUTPATIENT)
Dept: LAB | Facility: CLINIC | Age: 67
End: 2022-03-10
Payer: MEDICARE

## 2022-03-10 DIAGNOSIS — B18.2 CHRONIC HEPATITIS C WITHOUT HEPATIC COMA (H): ICD-10-CM

## 2022-03-10 DIAGNOSIS — K70.30 ALCOHOLIC CIRRHOSIS OF LIVER WITHOUT ASCITES (H): ICD-10-CM

## 2022-03-10 NOTE — PROGRESS NOTES
Hi,   Patient has las appointment on 3/14/22 with  orders. Can you please place new orders with appointment.   Thank you   Mary DE SANTIAGO

## 2022-03-14 ENCOUNTER — LAB (OUTPATIENT)
Dept: LAB | Facility: CLINIC | Age: 67
End: 2022-03-14
Payer: MEDICARE

## 2022-03-14 DIAGNOSIS — K70.30 ALCOHOLIC CIRRHOSIS OF LIVER WITHOUT ASCITES (H): ICD-10-CM

## 2022-03-14 LAB
ALBUMIN SERPL-MCNC: 3.6 G/DL (ref 3.4–5)
ALP SERPL-CCNC: 108 U/L (ref 40–150)
ALT SERPL W P-5'-P-CCNC: 27 U/L (ref 0–50)
ANION GAP SERPL CALCULATED.3IONS-SCNC: 9 MMOL/L (ref 3–14)
AST SERPL W P-5'-P-CCNC: 24 U/L (ref 0–45)
BILIRUB DIRECT SERPL-MCNC: 0.2 MG/DL (ref 0–0.2)
BILIRUB SERPL-MCNC: 0.4 MG/DL (ref 0.2–1.3)
BUN SERPL-MCNC: 50 MG/DL (ref 7–30)
CALCIUM SERPL-MCNC: 9.3 MG/DL (ref 8.5–10.1)
CHLORIDE BLD-SCNC: 112 MMOL/L (ref 94–109)
CO2 SERPL-SCNC: 17 MMOL/L (ref 20–32)
CREAT SERPL-MCNC: 1.59 MG/DL (ref 0.52–1.04)
ERYTHROCYTE [DISTWIDTH] IN BLOOD BY AUTOMATED COUNT: 14.1 % (ref 10–15)
GFR SERPL CREATININE-BSD FRML MDRD: 35 ML/MIN/1.73M2
GLUCOSE BLD-MCNC: 186 MG/DL (ref 70–99)
HCT VFR BLD AUTO: 29.5 % (ref 35–47)
HGB BLD-MCNC: 9.7 G/DL (ref 11.7–15.7)
INR PPP: 1.2 (ref 0.85–1.15)
MCH RBC QN AUTO: 29.5 PG (ref 26.5–33)
MCHC RBC AUTO-ENTMCNC: 32.9 G/DL (ref 31.5–36.5)
MCV RBC AUTO: 90 FL (ref 78–100)
PLATELET # BLD AUTO: 130 10E3/UL (ref 150–450)
POTASSIUM BLD-SCNC: 4.2 MMOL/L (ref 3.4–5.3)
PROT SERPL-MCNC: 8.1 G/DL (ref 6.8–8.8)
RBC # BLD AUTO: 3.29 10E6/UL (ref 3.8–5.2)
SODIUM SERPL-SCNC: 138 MMOL/L (ref 133–144)
WBC # BLD AUTO: 7 10E3/UL (ref 4–11)

## 2022-03-14 PROCEDURE — 85610 PROTHROMBIN TIME: CPT

## 2022-03-14 PROCEDURE — 82248 BILIRUBIN DIRECT: CPT

## 2022-03-14 PROCEDURE — 85027 COMPLETE CBC AUTOMATED: CPT

## 2022-03-14 PROCEDURE — 36415 COLL VENOUS BLD VENIPUNCTURE: CPT

## 2022-03-14 PROCEDURE — 80053 COMPREHEN METABOLIC PANEL: CPT

## 2022-03-17 NOTE — PROGRESS NOTES
[facility]  LIVER CLINIC FOLLOW UP  VIDEO VISIT  ASSESSMENT AND PLAN: Ms. aPlacios is a 67 Y F with decompensated alcoholic and HCV cirrhosis with ongoing alcohol use.      Ascites will get appt for paracentesis. If there is ascites will start furosemide and spironolactone  HCC screening UTD US 22. Due in 6 mo. Ordered  Variceal screening UTD EGD 21 no varices. PHG and esophagitis.  HCV Obtained SVR  CKD stage 3. Sees Dr. West  DM not on any medications A1C 5.8  Nocturia defer to PCP    Again discussed her alcohol use and recommendations to stop.     RTC 6 months.      Saige Villalta MD  Hepatology/Liver Transplant  Medical Director, Liver Transplantation  [facility]  ===================================================================  SUBJECTIVE: Ms. Palacios is a 67 Y F with HCV and alcoholic cirrhosis. LV 2018.    She was admitted to [facility] -22 for ARF/PNA, sepsis, GIB, ETOH withdrawal. Since then she has had alcohol a couple times. She has a drink when they go out. No further quantification. Not seeing anyone for support and hasn't for a couple years.     She is fatigued and feels like her abdomen is bloated.    HCV GT 2b. SVR 2018    HE: She had this in the past. None for a while now. No meds needed for over a year.    Ascites: She has had in the past and feels like she is bloated now.  Last US 1/3/22 mild ascites  HCC screening: US 1/3/22  Variceal screenin18. Normal. Due in 3 years  Colonoscopy 18. adenomatous polyps. due     Lab Test 22  1141   PROTTOTAL 8.1   ALBUMIN 3.6   BILITOTAL 0.4   ALKPHOS 108   AST 24   ALT 27     Lab Test 22  1141   WBC 7.0   RBC 3.29*   HGB 9.7*   HCT 29.5*   MCV 90   MCH 29.5   MCHC 32.9   RDW 14.1   *     Exam  Gen Alert pleasant NAD  Resp No difficulty breathing. No cough  Skin No Jaundice  Eyes No icterus  Neuro APARICIO  MSK no muscle wasting  Psyche Pleasant, appropriate. Well groomed.  Josette KENNEY  Suzanne is a 67 year old female who is being evaluated via a billable video visit.    Video-Visit Details  Type of service:  Video Visit  Video Start Time:935  Video End Time: 958  Originating Location (pt. Location):home  Distant Location (provider location):  [facility] [place]      Platform used for Video Visit: [email or web address] or [employer]

## 2022-03-18 ENCOUNTER — VIRTUAL VISIT (OUTPATIENT)
Dept: GASTROENTEROLOGY | Facility: CLINIC | Age: 67
End: 2022-03-18
Attending: INTERNAL MEDICINE
Payer: MEDICARE

## 2022-03-18 DIAGNOSIS — K70.31 ALCOHOLIC CIRRHOSIS OF LIVER WITH ASCITES (H): Primary | ICD-10-CM

## 2022-03-18 PROCEDURE — 99204 OFFICE O/P NEW MOD 45 MIN: CPT | Mod: 95 | Performed by: INTERNAL MEDICINE

## 2022-03-18 PROCEDURE — G0463 HOSPITAL OUTPT CLINIC VISIT: HCPCS | Mod: PN,RTG | Performed by: INTERNAL MEDICINE

## 2022-03-18 RX ORDER — FERROUS SULFATE 324(65)MG
TABLET, DELAYED RELEASE (ENTERIC COATED) ORAL
COMMUNITY

## 2022-03-18 RX ORDER — MULTIVITAMIN,THERAPEUTIC
1 TABLET ORAL DAILY
COMMUNITY

## 2022-03-18 ASSESSMENT — PAIN SCALES - GENERAL: PAINLEVEL: NO PAIN (0)

## 2022-03-18 NOTE — LETTER
3/18/2022     RE: Josette Palacios  [address]  [place] [address]    Dear Colleague,    Thank you for referring your patient, Josette Palacios, to the [facility] [place]. Please see a copy of my visit note below.    [facility]  LIVER CLINIC FOLLOW UP  VIDEO VISIT  ASSESSMENT AND PLAN: Ms. Palacios is a 67 Y F with decompensated alcoholic and HCV cirrhosis with ongoing alcohol use.      Ascites will get appt for paracentesis. If there is ascites will start furosemide and spironolactone  HCC screening UTD US 22. Due in 6 mo. Ordered  Variceal screening UTD EGD 21 no varices. PHG and esophagitis.  HCV Obtained SVR  CKD stage 3. Sees Dr. West  DM not on any medications A1C 5.8  Nocturia defer to PCP    Again discussed her alcohol use and recommendations to stop.     RTC 6 months.      Saige Villalta MD  Hepatology/Liver Transplant  Medical Director, Liver Transplantation  [facility]  ===================================================================  SUBJECTIVE: Ms. Palacios is a 67 Y F with HCV and alcoholic cirrhosis. LV 2018.    She was admitted to [facility] -22 for ARF/PNA, sepsis, GIB, ETOH withdrawal. Since then she has had alcohol a couple times. She has a drink when they go out. No further quantification. Not seeing anyone for support and hasn't for a couple years.     She is fatigued and feels like her abdomen is bloated.    HCV GT 2b. SVR 2018    HE: She had this in the past. None for a while now. No meds needed for over a year.    Ascites: She has had in the past and feels like she is bloated now.  Last US 1/3/22 mild ascites  HCC screening: US 1/3/22  Variceal screenin18. Normal. Due in 3 years  Colonoscopy 18. adenomatous polyps. due     Lab Test 22  1141   PROTTOTAL 8.1   ALBUMIN 3.6   BILITOTAL 0.4   ALKPHOS 108   AST 24   ALT 27     Lab Test 22  1141   WBC 7.0   RBC 3.29*   HGB  9.7*   HCT 29.5*   MCV 90   MCH 29.5   MCHC 32.9   RDW 14.1   *     Exam  Gen Alert pleasant NAD  Resp No difficulty breathing. No cough  Skin No Jaundice  Eyes No icterus  Neuro APARICIO  MSK no muscle wasting  Psyche Pleasant, appropriate. Well groomed.  Josette Palacios is a 67 year old female who is being evaluated via a billable video visit.    Video-Visit Details  Type of service:  Video Visit  Video Start Time:935  Video End Time: 958  Originating Location (pt. Location):home  Distant Location (provider location):  [facility]PAT[facility] CL[facility] [place]      Platform used for Video Visit: Cuffed and Wanted or [employer]    Again, thank you for allowing me to participate in the care of your patient.      Sincerely,    Saige Villalta MD

## 2022-03-18 NOTE — PROGRESS NOTES
"Josette is a 67 year old who is being evaluated via a billable video visit.      How would you like to obtain your AVS? MyChart  If the video visit is dropped, the invitation should be resent by: Text to cell phone: [phone]  Will anyone else be joining your video visit? No  {If patient encounters technical issues they should call [phone] :[id number]}    Video Start Time: {video visit start/end time for provider to select:152948}  Video-Visit Details    Type of service:  Video Visit    Video End Time:{video visit start/end time for provider to select:152948}    Originating Location (pt. Location): {video visit patient location:863611::\"Home\"}    Distant Location (provider location):  [facility]PAT[facility] [place]     Platform used for Video Visit: {Virtual Visit Platforms:913532::\"MSA Management\"}    "

## 2022-03-22 DIAGNOSIS — K70.11 ASCITES DUE TO ALCOHOLIC HEPATITIS (H): Primary | ICD-10-CM

## 2022-03-22 RX ORDER — ALBUTEROL SULFATE 90 UG/1
1-2 AEROSOL, METERED RESPIRATORY (INHALATION)
Status: CANCELLED
Start: 2022-03-22

## 2022-03-22 RX ORDER — HEPARIN SODIUM,PORCINE 10 UNIT/ML
5 VIAL (ML) INTRAVENOUS
Status: CANCELLED | OUTPATIENT
Start: 2022-03-22

## 2022-03-22 RX ORDER — LIDOCAINE HYDROCHLORIDE 10 MG/ML
20 INJECTION, SOLUTION EPIDURAL; INFILTRATION; INTRACAUDAL; PERINEURAL ONCE
Status: CANCELLED | OUTPATIENT
Start: 2022-03-22 | End: 2022-03-22

## 2022-03-22 RX ORDER — METHYLPREDNISOLONE SODIUM SUCCINATE 125 MG/2ML
125 INJECTION, POWDER, LYOPHILIZED, FOR SOLUTION INTRAMUSCULAR; INTRAVENOUS
Status: CANCELLED
Start: 2022-03-22

## 2022-03-22 RX ORDER — EPINEPHRINE 1 MG/ML
0.3 INJECTION, SOLUTION, CONCENTRATE INTRAVENOUS EVERY 5 MIN PRN
Status: CANCELLED | OUTPATIENT
Start: 2022-03-22

## 2022-03-22 RX ORDER — ALBUMIN (HUMAN) 12.5 G/50ML
12.5 SOLUTION INTRAVENOUS
Status: CANCELLED | OUTPATIENT
Start: 2022-03-22

## 2022-03-22 RX ORDER — MEPERIDINE HYDROCHLORIDE 25 MG/ML
25 INJECTION INTRAMUSCULAR; INTRAVENOUS; SUBCUTANEOUS EVERY 30 MIN PRN
Status: CANCELLED | OUTPATIENT
Start: 2022-03-22

## 2022-03-22 RX ORDER — NALOXONE HYDROCHLORIDE 0.4 MG/ML
0.2 INJECTION, SOLUTION INTRAMUSCULAR; INTRAVENOUS; SUBCUTANEOUS
Status: CANCELLED | OUTPATIENT
Start: 2022-03-22

## 2022-03-22 RX ORDER — HEPARIN SODIUM (PORCINE) LOCK FLUSH IV SOLN 100 UNIT/ML 100 UNIT/ML
5 SOLUTION INTRAVENOUS
Status: CANCELLED | OUTPATIENT
Start: 2022-03-22

## 2022-03-22 RX ORDER — DIPHENHYDRAMINE HYDROCHLORIDE 50 MG/ML
50 INJECTION INTRAMUSCULAR; INTRAVENOUS
Status: CANCELLED
Start: 2022-03-22

## 2022-03-22 RX ORDER — ALBUTEROL SULFATE 0.83 MG/ML
2.5 SOLUTION RESPIRATORY (INHALATION)
Status: CANCELLED | OUTPATIENT
Start: 2022-03-22

## 2022-04-05 NOTE — PROGRESS NOTES
Assessment & Plan     Hypertension, benign essential, goal below 140/90  Stable  Patient has had a steep decline in kidney function- going from GFR 45 to GFR 35 ffrom 01/10 to 03/14. This could stem from dehydration based on her elevated BUN. Re-evaluating kidney function, liver function, and electrolytes today.  - Comprehensive metabolic panel (BMP + Alb, Alk Phos, ALT, AST, Total. Bili, TP); Future  - UA with Microscopic reflex to Culture - lab collect; Future  - UA with Microscopic reflex to Culture - lab collect  - Comprehensive metabolic panel (BMP + Alb, Alk Phos, ALT, AST, Total. Bili, TP)    Anxiety associated with depression  [place] decision making on seeing therapist regarding chronic anxiety and depression. This may help with patient's symptoms of fatigue and memory issues.  - Adult Mental Health  Referral; Future    Anemia in other chronic diseases classified elsewhere  Stable  Recent CBC- hemoglobin 9.6. Currently on Ferrous sulfate 325 mg. Ferritin level to evaluate iron stores.  - Ferritin; Future  - Ferritin    Diabetes mellitus due to underlying condition with stage 3 chronic kidney disease, unspecified whether long term insulin use, unspecified whether stage 3a or 3b CKD (H)  Stable  - Hemoglobin A1c; Future  - Hemoglobin A1c    Patient has rales today on lower right side of posterior lung fields. This may be related to residual fluid from her pneumonia. She denies current shortness of breath.    Review of prior external note(s) from - [facility] information from [facility] reviewed  30 minutes spent on the date of the encounter doing chart review, history and exam, documentation and further activities per the note     Tobacco Cessation:   reports that she has been smoking cigarettes. She has a 12.50 pack-year smoking history. She has never used smokeless tobacco.  Tobacco Cessation Action Plan: Information offered: Patient not interested at this time    Depression Screening Follow  Up    PHQ 4/6/2022   PHQ-9 Total Score 11   Q9: Thoughts of better off dead/self-harm past 2 weeks Not at all     There are no Patient Instructions on file for this visit.    Return in about 3 months (around 7/6/2022) for Physical Exam, Lab Work.    Flavio Manning, student SEAN West MD  [facility] MAYI Finch is a 67 year old who presents for the following health issues  accompanied by her spouse.    HPI - Here for urinary frequency, hospitalization follow-up, depression/anxiety management  Voiding a lot. At times, she feels urge incontinence and voiding without meaning to. A little bit of leaking urine with cough/sneeze. Frequent and small amounts (every 1 hour). Symptoms have been occurring since the hospitalization and intubated with a catheter. Denies fever, painful urination, bloody urine, back pain, constipation, vaginal bleeding.     Post hospitalization follow up:12/18 to 01/05 related to GI bleed (esophagitis). There, she was intubated and in the ICU.  She was discharged with amlodipine, clonidine, lactulose (now finished), levofloxacin (pneumonia, now finished), metformin, melatonin, multivitamin, and pantoprazole  Patient was scheduled for paracentensis and unable to complete it due to her not having enough fluid to complete it per GI note on 03/23. Sensation of bloating. A little bit of swelling in ankles. Fatigue. These symptoms have been stable for her.  No shortness of breath.     Regarding alcohol use:   Since hospitalization; has had drinks 3 times total    Depression/anxiety that is chronic. Patient wants to talk about weaning off suboxone and is wondering about medical marijuana program. Memory is poor (misremembers things from last week). Lack of energy  PHQ9-11    Answers for HPI/ROS submitted by the patient on 4/6/2022  If you checked off any problems, how difficult have these problems made it for you to do your work, take care of things at  "home, or get along with other people?: Somewhat difficult  PHQ9 TOTAL SCORE: 11  GABRIELLE 7 TOTAL SCORE: 5  Do you check your blood pressure regularly outside of the clinic?: Yes  Are your blood pressures ever more than 140 on the top number (systolic) OR more than 90 on the bottom number (diastolic)? (For example, greater than 140/90): Yes  Are you following a low salt diet?: No  Depression/Anxiety: Depression & Anxiety  Status since last visit:: no change  Anxiety since last: : no change  Other associated symptoms of depression:: Yes  Other associated symotome: : Yes  Significant life event: : health concerns  Anxious:: Yes  Current substance use:: No  How many minutes a day do you exercise enough to make your heart beat faster?: 9 or less  How many days a week do you exercise enough to make your heart beat faster?: 3 or less  How many days per week do you miss taking your medication?: 1  What is the reason for your visit today?: Follow up  Have you tried or received treatment for these symptoms before?: Yes  Did that treatment work? : No    Conflicting answers have been found for some questions. Please document the patient's answers manually.     Review of Systems   Constitutional, HEENT, cardiovascular, pulmonary, GI, , musculoskeletal, neuro, skin, endocrine and psych systems are negative, except as otherwise noted in HPI or below:      Pruritus  Objective    /68   Pulse 68   Temp 97.6  F (36.4  C)   Ht 1.64 m (5' 4.57\")   Wt 61.8 kg (136 lb 3.2 oz)   LMP  (LMP Unknown)   SpO2 98%   BMI 22.97 kg/m    Body mass index is 22.97 kg/m .  Physical Exam   GENERAL: healthy, alert and no distress  EYES: Eyes grossly normal to inspection, PERRL and conjunctivae and sclerae normal  HENT: ear canals and TM's normal, nose and mouth without ulcers or lesions  NECK: no adenopathy, no asymmetry, masses, or scars and thyroid normal to palpation  RESP: Breathing non-labored. Right lower lung rales. Lung lung and upper " lungs clear to auscultation.  CV: regular rate and rhythm, normal S1 S2, no S3 or S4, no murmur, click or rub, no peripheral edema and peripheral pulses strong  ABDOMEN: Abdominal swelling (baseline for patient per family). Liver palpable a few cm below costal edge. soft, nontender, no masses and bowel sounds normal  MS: no gross musculoskeletal defects noted, no edema  SKIN: Multiple spider angiomas on back. Multiple ecchymoses on bilateral arms. Dark discoloration on legs consistent with stasis dermatitis.  NEURO: Normal strength and tone. Speech slow and mentation slow to respond.  PSYCH: Affect normal/bright    Results for orders placed or performed in visit on 04/06/22   UA with Microscopic reflex to Culture - lab collect     Status: Abnormal    Specimen: Urine, Midstream   Result Value Ref Range    Color Urine Yellow Colorless, Straw, Light Yellow, Yellow    Appearance Urine Clear Clear    Glucose Urine Negative Negative mg/dL    Bilirubin Urine Negative Negative    Ketones Urine Negative Negative mg/dL    Specific Gravity Urine >=1.030 1.003 - 1.035    Blood Urine Negative Negative    pH Urine 5.0 5.0 - 7.0    Protein Albumin Urine 100  (A) Negative mg/dL    Urobilinogen Urine 0.2 0.2, 1.0 E.U./dL    Nitrite Urine Negative Negative    Leukocyte Esterase Urine Negative Negative   Hemoglobin A1c     Status: Abnormal   Result Value Ref Range    Hemoglobin A1C 6.5 (H) 0.0 - 5.6 %   Urine Microscopic     Status: Abnormal   Result Value Ref Range    Bacteria Urine Few (A) None Seen /HPF    RBC Urine 0-2 0-2 /HPF /HPF    WBC Urine 0-5 0-5 /HPF /HPF    Squamous Epithelials Urine Moderate (A) None Seen /LPF    Narrative    Urine Culture not indicated     Results for orders placed or performed in visit on 04/06/22 (from the past 24 hour(s))   UA with Microscopic reflex to Culture - lab collect    Specimen: Urine, Midstream   Result Value Ref Range    Color Urine Yellow Colorless, Straw, Light Yellow, Yellow     Appearance Urine Clear Clear    Glucose Urine Negative Negative mg/dL    Bilirubin Urine Negative Negative    Ketones Urine Negative Negative mg/dL    Specific Gravity Urine >=1.030 1.003 - 1.035    Blood Urine Negative Negative    pH Urine 5.0 5.0 - 7.0    Protein Albumin Urine 100  (A) Negative mg/dL    Urobilinogen Urine 0.2 0.2, 1.0 E.U./dL    Nitrite Urine Negative Negative    Leukocyte Esterase Urine Negative Negative   Hemoglobin A1c   Result Value Ref Range    Hemoglobin A1C 6.5 (H) 0.0 - 5.6 %   Urine Microscopic   Result Value Ref Range    Bacteria Urine Few (A) None Seen /HPF    RBC Urine 0-2 0-2 /HPF /HPF    WBC Urine 0-5 0-5 /HPF /HPF    Squamous Epithelials Urine Moderate (A) None Seen /LPF    Narrative    Urine Culture not indicated       This note is the result of directly supervised history and examination of this patient by the above nurse practitioner student by myself   Ishaan West MD

## 2022-04-06 ENCOUNTER — OFFICE VISIT (OUTPATIENT)
Dept: FAMILY MEDICINE | Facility: CLINIC | Age: 67
End: 2022-04-06
Payer: MEDICARE

## 2022-04-06 VITALS
HEIGHT: 65 IN | BODY MASS INDEX: 22.69 KG/M2 | DIASTOLIC BLOOD PRESSURE: 68 MMHG | SYSTOLIC BLOOD PRESSURE: 118 MMHG | HEART RATE: 68 BPM | WEIGHT: 136.2 LBS | OXYGEN SATURATION: 98 % | TEMPERATURE: 97.6 F

## 2022-04-06 DIAGNOSIS — D63.8 ANEMIA IN OTHER CHRONIC DISEASES CLASSIFIED ELSEWHERE: ICD-10-CM

## 2022-04-06 DIAGNOSIS — N18.30 DIABETES MELLITUS DUE TO UNDERLYING CONDITION WITH STAGE 3 CHRONIC KIDNEY DISEASE, UNSPECIFIED WHETHER LONG TERM INSULIN USE, UNSPECIFIED WHETHER STAGE 3A OR 3B CKD (H): Primary | ICD-10-CM

## 2022-04-06 DIAGNOSIS — I10 HYPERTENSION, BENIGN ESSENTIAL, GOAL BELOW 140/90: ICD-10-CM

## 2022-04-06 DIAGNOSIS — F41.8 ANXIETY ASSOCIATED WITH DEPRESSION: ICD-10-CM

## 2022-04-06 DIAGNOSIS — E08.22 DIABETES MELLITUS DUE TO UNDERLYING CONDITION WITH STAGE 3 CHRONIC KIDNEY DISEASE, UNSPECIFIED WHETHER LONG TERM INSULIN USE, UNSPECIFIED WHETHER STAGE 3A OR 3B CKD (H): Primary | ICD-10-CM

## 2022-04-06 LAB
ALBUMIN UR-MCNC: 100 MG/DL
APPEARANCE UR: CLEAR
BACTERIA #/AREA URNS HPF: ABNORMAL /HPF
BILIRUB UR QL STRIP: NEGATIVE
COLOR UR AUTO: YELLOW
GLUCOSE UR STRIP-MCNC: NEGATIVE MG/DL
HBA1C MFR BLD: 6.5 % (ref 0–5.6)
HGB UR QL STRIP: NEGATIVE
KETONES UR STRIP-MCNC: NEGATIVE MG/DL
LEUKOCYTE ESTERASE UR QL STRIP: NEGATIVE
NITRATE UR QL: NEGATIVE
PH UR STRIP: 5 [PH] (ref 5–7)
RBC #/AREA URNS AUTO: ABNORMAL /HPF
SP GR UR STRIP: >=1.03 (ref 1–1.03)
SQUAMOUS #/AREA URNS AUTO: ABNORMAL /LPF
UROBILINOGEN UR STRIP-ACNC: 0.2 E.U./DL
WBC #/AREA URNS AUTO: ABNORMAL /HPF

## 2022-04-06 PROCEDURE — 99214 OFFICE O/P EST MOD 30 MIN: CPT | Performed by: FAMILY MEDICINE

## 2022-04-06 PROCEDURE — 81001 URINALYSIS AUTO W/SCOPE: CPT | Performed by: FAMILY MEDICINE

## 2022-04-06 PROCEDURE — 80053 COMPREHEN METABOLIC PANEL: CPT | Performed by: FAMILY MEDICINE

## 2022-04-06 PROCEDURE — 82728 ASSAY OF FERRITIN: CPT | Performed by: FAMILY MEDICINE

## 2022-04-06 PROCEDURE — 83036 HEMOGLOBIN GLYCOSYLATED A1C: CPT | Performed by: FAMILY MEDICINE

## 2022-04-06 PROCEDURE — 36415 COLL VENOUS BLD VENIPUNCTURE: CPT | Performed by: FAMILY MEDICINE

## 2022-04-06 ASSESSMENT — ANXIETY QUESTIONNAIRES
6. BECOMING EASILY ANNOYED OR IRRITABLE: SEVERAL DAYS
GAD7 TOTAL SCORE: 5
5. BEING SO RESTLESS THAT IT IS HARD TO SIT STILL: SEVERAL DAYS
1. FEELING NERVOUS, ANXIOUS, OR ON EDGE: SEVERAL DAYS
GAD7 TOTAL SCORE: 5
4. TROUBLE RELAXING: SEVERAL DAYS
7. FEELING AFRAID AS IF SOMETHING AWFUL MIGHT HAPPEN: SEVERAL DAYS
2. NOT BEING ABLE TO STOP OR CONTROL WORRYING: NOT AT ALL
7. FEELING AFRAID AS IF SOMETHING AWFUL MIGHT HAPPEN: SEVERAL DAYS
GAD7 TOTAL SCORE: 5
3. WORRYING TOO MUCH ABOUT DIFFERENT THINGS: NOT AT ALL

## 2022-04-06 ASSESSMENT — PATIENT HEALTH QUESTIONNAIRE - PHQ9
SUM OF ALL RESPONSES TO PHQ QUESTIONS 1-9: 11
10. IF YOU CHECKED OFF ANY PROBLEMS, HOW DIFFICULT HAVE THESE PROBLEMS MADE IT FOR YOU TO DO YOUR WORK, TAKE CARE OF THINGS AT HOME, OR GET ALONG WITH OTHER PEOPLE: SOMEWHAT DIFFICULT
SUM OF ALL RESPONSES TO PHQ QUESTIONS 1-9: 11

## 2022-04-07 PROBLEM — N18.31 STAGE 3A CHRONIC KIDNEY DISEASE (H): Status: ACTIVE | Noted: 2019-06-19

## 2022-04-07 LAB
ALBUMIN SERPL-MCNC: 3.5 G/DL (ref 3.4–5)
ALP SERPL-CCNC: 124 U/L (ref 40–150)
ALT SERPL W P-5'-P-CCNC: 28 U/L (ref 0–50)
ANION GAP SERPL CALCULATED.3IONS-SCNC: 6 MMOL/L (ref 3–14)
AST SERPL W P-5'-P-CCNC: 21 U/L (ref 0–45)
BILIRUB SERPL-MCNC: 0.3 MG/DL (ref 0.2–1.3)
BUN SERPL-MCNC: 45 MG/DL (ref 7–30)
CALCIUM SERPL-MCNC: 9.3 MG/DL (ref 8.5–10.1)
CHLORIDE BLD-SCNC: 112 MMOL/L (ref 94–109)
CO2 SERPL-SCNC: 19 MMOL/L (ref 20–32)
CREAT SERPL-MCNC: 1.28 MG/DL (ref 0.52–1.04)
FERRITIN SERPL-MCNC: 139 NG/ML (ref 8–252)
GFR SERPL CREATININE-BSD FRML MDRD: 46 ML/MIN/1.73M2
GLUCOSE BLD-MCNC: 271 MG/DL (ref 70–99)
POTASSIUM BLD-SCNC: 5.3 MMOL/L (ref 3.4–5.3)
PROT SERPL-MCNC: 7.6 G/DL (ref 6.8–8.8)
SODIUM SERPL-SCNC: 137 MMOL/L (ref 133–144)

## 2022-04-07 ASSESSMENT — PATIENT HEALTH QUESTIONNAIRE - PHQ9: SUM OF ALL RESPONSES TO PHQ QUESTIONS 1-9: 11

## 2022-04-07 ASSESSMENT — ANXIETY QUESTIONNAIRES: GAD7 TOTAL SCORE: 5

## 2022-04-07 NOTE — RESULT ENCOUNTER NOTE
Haroldo Nathan- the rest of your labs are within normal limits, without any worrisome abnormals. I think the decreased kidney function is due to not drinking enough water; so please have an extra glass of water morning and afternoon; recommend labs be rechecked in 2 months (not 3 months). Please schedule soon as appointments are booking out.       Ishaan

## 2022-04-20 DIAGNOSIS — F41.8 ANXIETY ASSOCIATED WITH DEPRESSION: ICD-10-CM

## 2022-04-20 RX ORDER — SERTRALINE HYDROCHLORIDE 100 MG/1
TABLET, FILM COATED ORAL
Qty: 90 TABLET | Refills: 3 | Status: SHIPPED | OUTPATIENT
Start: 2022-04-20 | End: 2023-04-17 | Stop reason: DRUGHIGH

## 2022-04-20 RX ORDER — PRAMIPEXOLE DIHYDROCHLORIDE 0.12 MG/1
TABLET ORAL
Qty: 90 TABLET | Refills: 3 | Status: SHIPPED | OUTPATIENT
Start: 2022-04-20 | End: 2023-05-01

## 2022-04-20 NOTE — TELEPHONE ENCOUNTER
"Requested Prescriptions   Pending Prescriptions Disp Refills     sertraline (ZOLOFT) 100 MG tablet [Pharmacy Med Name: SERTRALINE 100MG TABLETS] 90 tablet 3     Sig: TAKE 1 TABLET(100 MG) BY MOUTH DAILY       SSRIs Protocol Failed - 4/20/2022  7:55 AM        Failed - PHQ-9 score less than 5 in past 6 months     Please review last PHQ-9 score.           Passed - Medication is active on med list        Passed - Patient is age 18 or older        Passed - No active pregnancy on record        Passed - No positive pregnancy test in last 12 months        Passed - Recent (6 mo) or future (30 days) visit within the authorizing provider's specialty     Patient had office visit in the last 6 months or has a visit in the next 30 days with authorizing provider or within the authorizing provider's specialty.  See \"Patient Info\" tab in inbasket, or \"Choose Columns\" in Meds & Orders section of the refill encounter.               pramipexole (MIRAPEX) 0.125 MG tablet [Pharmacy Med Name: PRAMIPEXOLE 0.125MG TABLETS] 90 tablet 3     Sig: TAKE 1 TABLET(0.125 MG) BY MOUTH AT BEDTIME       Antiparkinson's Agents Protocol Passed - 4/20/2022  7:55 AM        Passed - Blood pressure under 140/90 in past 12 months     BP Readings from Last 3 Encounters:   04/06/22 118/68   01/10/22 137/67   03/04/20 (!) 158/72                 Passed - CBC on record in past 12 months     Recent Labs   Lab Test 03/14/22  1141   WBC 7.0   RBC 3.29*   HGB 9.7*   HCT 29.5*   *                 Passed - ALT on record in past 12 months         Recent Labs   Lab Test 04/06/22  1227   ALT 28             Passed - Serum Creatinine on file in past 12 months     Recent Labs   Lab Test 04/06/22  1227 03/14/14  1040 03/10/14  1148   CR 1.28*   < >  --    CRPOC  --   --  0.4*    < > = values in this interval not displayed.       Ok to refill medication if creatinine is low          Passed - Medication is active on med list        Passed - Patient is age 18 or older    " "    Passed - No active pregnancy on record        Passed - No positive pregnancy test in the past 12 months        Passed - Recent (6 mo) or future (30 days) visit within the authorizing provider's specialty     Patient had office visit in the last 6 months or has a visit in the next 30 days with authorizing provider or within the authorizing provider's specialty.  See \"Patient Info\" tab in inbasket, or \"Choose Columns\" in Meds & Orders section of the refill encounter.               PHQ9 sent via [email or web address].     Thanks,  RALEIGH Lucas  [facility]     "

## 2022-05-09 ENCOUNTER — TELEPHONE (OUTPATIENT)
Dept: FAMILY MEDICINE | Facility: CLINIC | Age: 67
End: 2022-05-09
Payer: MEDICARE

## 2022-05-09 DIAGNOSIS — R32 URINARY INCONTINENCE, UNSPECIFIED TYPE: Primary | ICD-10-CM

## 2022-05-10 NOTE — TELEPHONE ENCOUNTER
Dr. West,    See below. Ok Urology referral? Cued    Thanks,  RALEIGH Lucas  [facility]

## 2022-05-11 NOTE — TELEPHONE ENCOUNTER
[employer] message sent to patient.     Thanks,  RALEIGH Lucas  [facility]

## 2022-05-15 DIAGNOSIS — Z51.81 ENCOUNTER FOR MONITORING SUBOXONE MAINTENANCE THERAPY: ICD-10-CM

## 2022-05-15 DIAGNOSIS — Z79.899 ENCOUNTER FOR MONITORING SUBOXONE MAINTENANCE THERAPY: ICD-10-CM

## 2022-05-16 RX ORDER — BUPRENORPHINE HYDROCHLORIDE AND NALOXONE HYDROCHLORIDE DIHYDRATE 2; .5 MG/1; MG/1
TABLET SUBLINGUAL
Qty: 60 TABLET | Refills: 0 | Status: SHIPPED | OUTPATIENT
Start: 2022-05-16 | End: 2022-06-22

## 2022-05-16 NOTE — TELEPHONE ENCOUNTER
Routing refill request to provider for review/approval because:  Drug not on the [facility] refill protocol refil request for suboxone     Raysa Silva RN  [facility]

## 2022-06-13 ENCOUNTER — IMMUNIZATION (OUTPATIENT)
Dept: NURSING | Facility: CLINIC | Age: 67
End: 2022-06-13
Payer: MEDICARE

## 2022-06-13 PROCEDURE — 91306 COVID-19,PF,MODERNA (18+ YRS BOOSTER .25ML): CPT

## 2022-06-13 PROCEDURE — 0064A COVID-19,PF,MODERNA (18+ YRS BOOSTER .25ML): CPT

## 2022-06-16 ENCOUNTER — OFFICE VISIT (OUTPATIENT)
Dept: UROLOGY | Facility: CLINIC | Age: 67
End: 2022-06-16
Attending: FAMILY MEDICINE
Payer: MEDICARE

## 2022-06-16 VITALS
TEMPERATURE: 98.1 F | HEART RATE: 84 BPM | DIASTOLIC BLOOD PRESSURE: 61 MMHG | SYSTOLIC BLOOD PRESSURE: 121 MMHG | OXYGEN SATURATION: 97 %

## 2022-06-16 DIAGNOSIS — R32 URINARY INCONTINENCE, UNSPECIFIED TYPE: ICD-10-CM

## 2022-06-16 LAB
ALBUMIN UR-MCNC: >=300 MG/DL
APPEARANCE UR: CLEAR
BILIRUB UR QL STRIP: ABNORMAL
COLOR UR AUTO: YELLOW
GLUCOSE UR STRIP-MCNC: NEGATIVE MG/DL
HGB UR QL STRIP: ABNORMAL
KETONES UR STRIP-MCNC: NEGATIVE MG/DL
LEUKOCYTE ESTERASE UR QL STRIP: NEGATIVE
NITRATE UR QL: NEGATIVE
PH UR STRIP: 5.5 [PH] (ref 5–7)
RBC #/AREA URNS AUTO: ABNORMAL /HPF
SP GR UR STRIP: 1.02 (ref 1–1.03)
SQUAMOUS #/AREA URNS AUTO: ABNORMAL /LPF
UROBILINOGEN UR STRIP-ACNC: 0.2 E.U./DL
WBC #/AREA URNS AUTO: ABNORMAL /HPF

## 2022-06-16 PROCEDURE — 99205 OFFICE O/P NEW HI 60 MIN: CPT | Mod: 25 | Performed by: UROLOGY

## 2022-06-16 PROCEDURE — 81001 URINALYSIS AUTO W/SCOPE: CPT | Performed by: UROLOGY

## 2022-06-16 PROCEDURE — 52000 CYSTOURETHROSCOPY: CPT | Performed by: UROLOGY

## 2022-06-16 NOTE — PROGRESS NOTES
"Josette Palacios is a 67 year old female seen in consultation for incontinence. Consult from Ishaan West.  (Presents today with her  Carlos)      Pt c/o urinary frequency, urgency and urge incontinence x 3+ yrs, progressive.    Now wears 3+ Depend per day, also Depend at nite.    Limited ELGIN, no insensate loss.    Denies dysuria, gross hematuria. Voids about q 90 minutes during the day and noc x 4.    Denies recent UTI, prior  hx, trial on bladder meds, hx bladder surgery, success with Kegel's.    Hx 1 vag delivery.    Denies constipation and fecal incontinence but does report \"oily and greasy\" stools on occasion (see below).    Drinks 2 caf coffee, 4 [place], 2 soda per day. Also drinks daily EtOH including 3-4 drinks of Vodka per day    Smokes 1 PPD x 45 years.    Reviewed PMH in detail including Hep C, ascities, hepatic encephalopathy, nicotine dependence, alcoholic cirrosis, GI bleed, type II DM, fall, narcotic dependence, pneumonia, alcohol dependence, HTN; was scheduled for paracentesis last March      Past Medical History:   Diagnosis Date     Anxiety associated with depression 1/17/2014     Ascites      Chronic hepatitis C without hepatic coma (H) 4/3/2019     Hepatic encephalopathy (H) 2/8/2014     Nicotine dependence        Past Surgical History:   Procedure Laterality Date     breast implants       ESOPHAGOSCOPY, GASTROSCOPY, DUODENOSCOPY (EGD), COMBINED  1/24/2014    Procedure: COMBINED ESOPHAGOSCOPY, GASTROSCOPY, DUODENOSCOPY (EGD);;  Surgeon: Tony Moctezuma MD;  Location:  GI     ESOPHAGOSCOPY, GASTROSCOPY, DUODENOSCOPY (EGD), COMBINED N/A 9/13/2018    Procedure: COMBINED ESOPHAGOSCOPY, GASTROSCOPY, DUODENOSCOPY (EGD);  EGD/Colonoscopy ;  Surgeon: Saige Villalta MD;  Location:  GI     ORTHOPEDIC SURGERY      TKR left       Social History     Socioeconomic History     Marital status:      Spouse name: Not on file     Number of children: Not on file     Years of " education: Not on file     Highest education level: Not on file   Occupational History     Not on file   Tobacco Use     Smoking status: Current Every Day Smoker     Packs/day: 0.50     Years: 25.00     Pack years: 12.50     Types: Cigarettes     Smokeless tobacco: Never Used   Substance and Sexual Activity     Alcohol use: No     Alcohol/week: 0.0 standard drinks     Drug use: No     Sexual activity: Yes     Partners: Male   Other Topics Concern     Parent/sibling w/ CABG, MI or angioplasty before 65F 55M? No   Social History Narrative     Not on file     Social Determinants of Health     Financial Resource Strain: Not on file   Food Insecurity: Not on file   Transportation Needs: Not on file   Physical Activity: Not on file   Stress: Not on file   Social Connections: Not on file   Intimate Partner Violence: Not on file   Housing Stability: Not on file       Current Outpatient Medications   Medication Sig Dispense Refill     ACE/ARB/ARNI NOT PRESCRIBED (INTENTIONAL) Please choose reason not prescribed from choices below.       amLODIPine (NORVASC) 5 MG tablet Take 1 tablet (5 mg) by mouth daily 90 tablet 1     blood glucose (NO BRAND SPECIFIED) test strip Use to test blood sugar 2 times daily or as directed. Contour next test strips 100 strip 3     blood glucose monitoring (NO BRAND SPECIFIED) meter device kit Use to test blood sugar 2 times daily or as directed. 1 kit 0     Blood Pressure Monitoring (BLOOD PRESSURE MONITOR/M CUFF) MISC Use to check blood pressure daily 1 each 0     buprenorphine-naloxone (SUBOXONE) 2-0.5 MG SUBL sublingual tablet DISSOLVE 1 TABLET UNDER THE TONGUE TWICE DAILY 60 tablet 0     calcium citrate-vitamin D (CITRACAL) 315-200 MG-UNIT TABS per tablet Take 2 tablets by mouth daily        cloNIDine (CATAPRES) 0.1 MG tablet Take 1 tablet (0.1 mg) by mouth 2 times daily 180 tablet 3     Ferrous Sulfate 324 (65 Fe) MG TBEC        lactulose encephalopathy (CHRONULAC) 10 GM/15ML SOLUTION Take 15  mLs (10 g) by mouth 2 times daily 946 mL 3     multivitamin, therapeutic (THERA-VIT) TABS tablet Take 1 tablet by mouth daily       mupirocin (BACTROBAN) 2 % external cream Apply topically 3 times daily 30 g 0     pramipexole (MIRAPEX) 0.125 MG tablet TAKE 1 TABLET(0.125 MG) BY MOUTH AT BEDTIME 90 tablet 3     sertraline (ZOLOFT) 100 MG tablet TAKE 1 TABLET(100 MG) BY MOUTH DAILY 90 tablet 3     traZODone (DESYREL) 50 MG tablet TAKE 1 TABLET(50 MG) BY MOUTH EVERY EVENING AS NEEDED FOR SLEEP 90 tablet 1     valACYclovir (VALTREX) 500 MG tablet TAKE ONE TABLET BY MOUTH TWICE DAILY AS NEEDED 20 tablet 3       Physical Exam:    GENL: NAD. Tobacco odor   ABD: Soft, non-tender, ascities.    EG: Moderately-estrogenized, no masses.    VAGINA: Moderately-estrogenized, no masses.    BN HYPERMOBILITY: None.    CYSTOCELE: None.    APICAL PROLAPSE: None.    RECTOCELE: None.    BIMANUAL: No mass or tenderness.    Cysto:    (Informed consent obtained. Pause for cause performed)   Sterile prep.    17 Fr scope inserted through urethra. Systematic examination w 70 degree lens.   PVR: Nil   MUCOSA: Normal without lesion   ORIFICES: Normal location and morphology   CAPACITY: 300 cc; no pain with filling; apparent UDC with leak around the scope   Scope withdrawn without untoward effect.    Valsalva:   No hypermobility, moderate leakage noted.    (Pt tolerated procedure without difficulty).                    Results for orders placed or performed in visit on 06/16/22   UA reflex to Microscopic     Status: Abnormal   Result Value Ref Range    Color Urine Yellow Colorless, Straw, Light Yellow, Yellow    Appearance Urine Clear Clear    Glucose Urine Negative Negative mg/dL    Bilirubin Urine Small (A) Negative    Ketones Urine Negative Negative mg/dL    Specific Gravity Urine 1.025 1.003 - 1.035    Blood Urine Moderate (A) Negative    pH Urine 5.5 5.0 - 7.0    Protein Albumin Urine >=300 (A) Negative mg/dL    Urobilinogen Urine 0.2 0.2,  1.0 E.U./dL    Nitrite Urine Negative Negative    Leukocyte Esterase Urine Negative Negative   Urine Microscopic Exam     Status: Abnormal   Result Value Ref Range    RBC Urine 2-5 (A) 0-2 /HPF /HPF    WBC Urine 0-5 0-5 /HPF /HPF    Squamous Epithelials Urine Few (A) None Seen /LPF       IMP:  1. OAB wet in face of ongoing alcoholism/cirrosis/ascities  2. ELGIN on exam; looks like ISD  3. Other siginficant medical issues      PLAN:  1. Discussed situation with patient in detail.  2. Consider moderation/elimination of alcohol; pt expresses understanding and elects to proceed with this plan of behavioral modification  3. RTC 1 month to review progress; may consider med at that point  4. Set realistic expectations  5. Total time spent in reviewing patient records, discussing history, performing exam, discussing diagnosis, outlining treatment plan and documentation: 60 minutes

## 2022-06-16 NOTE — PATIENT INSTRUCTIONS
Patient Education   Resources to Help You Quit Smoking  If you have quit smoking or are thinking about quitting, congratulations! It can be hard to quit smoking, but the benefits are well worth it. To help you quit and stay smoke-free, there are many resources that can help.  Your health plan  If you have health insurance, call them for more details about their phone coaching programs.  [facility]:  3-436-229-BLUE ([phone])  [facility]:  6-088-788-QUIT ([phone])  [facility]:  0-385-066-BLUE ([phone])  [facility]:  [phone]  [employer]:  [phone]  [facility] members:  [phone]  [facility]:   [phone]  [facility]:  [phone]  [facility]:  [phone]  Other resources  American Cancer Society: 1-966.667.8834  The American Cancer Society can help you find local resources to quit smoking.  [employer]: 9-918-980-PLAN (1-424.832.6322)  Offers a telephone helpline, gum, patches and lozenges. These services are free for the uninsured and those without coverage. The online program is free to everyone at www.quitplan.com.  American Lung Association: 2-324-LUNG-USA (1-149.678.6652)  Provides a lung helpline as well as an online program, self-help book and group clinic support for quitting smoking. www.lung.org/stop-smoking  National [employer] [place]:  8-090-343-QUIT (1-486.381.8790)  Offers a telephone hotline, online text chat and a website with tools, information and support for smokers who want to quit. www.smokefree.gov  Medication Therapy Management (MTM):  [phone] [phone] (toll free)  mtm@[facility].org  This is a clinic program to help you quit smoking. It offers one-on-one sessions with a pharmacist. Call or email to find out if your insurance covers MTM and to schedule an appointment at all locations.  For informational purposes only. Not to replace the  advice of your health care provider. Copyright   2013 [facility]. All rights reserved. Clinically reviewed by Kathy Galindo MD, [facility] Lung Cancer Screening Program. Outcome Referrals [id number] - Rev 06/19.

## 2022-06-22 DIAGNOSIS — Z51.81 ENCOUNTER FOR MONITORING SUBOXONE MAINTENANCE THERAPY: ICD-10-CM

## 2022-06-22 DIAGNOSIS — Z79.899 ENCOUNTER FOR MONITORING SUBOXONE MAINTENANCE THERAPY: ICD-10-CM

## 2022-06-22 NOTE — TELEPHONE ENCOUNTER
Request buprenorphine-naloxone (SUBOXONE) 2-0.5 MG SUBL sublingual tablet be sent to    [id number] - [facility], [place] - [address] AT [address] [address]

## 2022-06-23 RX ORDER — BUPRENORPHINE HYDROCHLORIDE AND NALOXONE HYDROCHLORIDE DIHYDRATE 2; .5 MG/1; MG/1
1 TABLET SUBLINGUAL 2 TIMES DAILY
Qty: 60 TABLET | Refills: 0 | Status: SHIPPED | OUTPATIENT
Start: 2022-06-23 | End: 2022-08-04

## 2022-06-23 NOTE — TELEPHONE ENCOUNTER
Routing refill request to provider for review/approval because:  Drug not on the [facility] refill protocol   Please authorize if appropriate.  Thanks,  Liza May RN

## 2022-07-01 ENCOUNTER — TELEPHONE (OUTPATIENT)
Dept: FAMILY MEDICINE | Facility: CLINIC | Age: 67
End: 2022-07-01

## 2022-07-25 ENCOUNTER — OFFICE VISIT (OUTPATIENT)
Dept: FAMILY MEDICINE | Facility: CLINIC | Age: 67
End: 2022-07-25
Payer: MEDICARE

## 2022-07-25 VITALS
HEART RATE: 72 BPM | WEIGHT: 138.4 LBS | BODY MASS INDEX: 23.63 KG/M2 | SYSTOLIC BLOOD PRESSURE: 149 MMHG | OXYGEN SATURATION: 97 % | HEIGHT: 64 IN | TEMPERATURE: 97.5 F | DIASTOLIC BLOOD PRESSURE: 74 MMHG | RESPIRATION RATE: 14 BRPM

## 2022-07-25 DIAGNOSIS — F10.288 ALCOHOL DEPENDENCE WITH OTHER ALCOHOL-INDUCED DISORDER (H): ICD-10-CM

## 2022-07-25 DIAGNOSIS — L30.9 ECZEMA, UNSPECIFIED TYPE: ICD-10-CM

## 2022-07-25 DIAGNOSIS — Z01.818 PREOP GENERAL PHYSICAL EXAM: Primary | ICD-10-CM

## 2022-07-25 DIAGNOSIS — N18.31 CKD STAGE G3A/A2, GFR 45-59 AND ALBUMIN CREATININE RATIO 30-299 MG/G (H): ICD-10-CM

## 2022-07-25 DIAGNOSIS — Z12.31 VISIT FOR SCREENING MAMMOGRAM: ICD-10-CM

## 2022-07-25 DIAGNOSIS — K70.31 ALCOHOLIC CIRRHOSIS OF LIVER WITH ASCITES (H): ICD-10-CM

## 2022-07-25 DIAGNOSIS — I10 HYPERTENSION, BENIGN ESSENTIAL, GOAL BELOW 140/90: ICD-10-CM

## 2022-07-25 DIAGNOSIS — H25.9 AGE-RELATED CATARACT OF BOTH EYES, UNSPECIFIED AGE-RELATED CATARACT TYPE: ICD-10-CM

## 2022-07-25 DIAGNOSIS — F11.11 HISTORY OF HEROIN ABUSE (H): ICD-10-CM

## 2022-07-25 PROBLEM — F10.20 ALCOHOL DEPENDENCE, EPISODIC (H): Status: RESOLVED | Noted: 2017-11-22 | Resolved: 2022-07-25

## 2022-07-25 PROCEDURE — 90677 PCV20 VACCINE IM: CPT | Performed by: PHYSICIAN ASSISTANT

## 2022-07-25 PROCEDURE — 90715 TDAP VACCINE 7 YRS/> IM: CPT | Performed by: PHYSICIAN ASSISTANT

## 2022-07-25 PROCEDURE — G0009 ADMIN PNEUMOCOCCAL VACCINE: HCPCS | Performed by: PHYSICIAN ASSISTANT

## 2022-07-25 PROCEDURE — 90472 IMMUNIZATION ADMIN EACH ADD: CPT | Performed by: PHYSICIAN ASSISTANT

## 2022-07-25 PROCEDURE — 99214 OFFICE O/P EST MOD 30 MIN: CPT | Mod: 25 | Performed by: PHYSICIAN ASSISTANT

## 2022-07-25 RX ORDER — TRIAMCINOLONE ACETONIDE 0.25 MG/G
CREAM TOPICAL 2 TIMES DAILY
Qty: 45 G | Refills: 0 | Status: SHIPPED | OUTPATIENT
Start: 2022-07-25 | End: 2023-06-02

## 2022-07-25 ASSESSMENT — PAIN SCALES - GENERAL: PAINLEVEL: NO PAIN (0)

## 2022-07-25 ASSESSMENT — PATIENT HEALTH QUESTIONNAIRE - PHQ9
10. IF YOU CHECKED OFF ANY PROBLEMS, HOW DIFFICULT HAVE THESE PROBLEMS MADE IT FOR YOU TO DO YOUR WORK, TAKE CARE OF THINGS AT HOME, OR GET ALONG WITH OTHER PEOPLE: SOMEWHAT DIFFICULT
SUM OF ALL RESPONSES TO PHQ QUESTIONS 1-9: 10
SUM OF ALL RESPONSES TO PHQ QUESTIONS 1-9: 10

## 2022-07-25 NOTE — PROGRESS NOTES
[address] [address]  Phone: [phone]  Primary Provider: Ishaan West  Pre-op Performing Provider: JADA EDMOND      PREOPERATIVE EVALUATION:  Today's date: 7/25/2022    Josette Palacios is a 67 year old female who presents for a preoperative evaluation.    Surgical Information:  Surgery/Procedure: Cataract, R eye  Surgery Location: [facility] in [place]  Surgeon: Dr. Patrick Riedel  Surgery Date: 08/04/2022  Time of Surgery: TBD  Where patient plans to recover: At home alone  Fax number for surgical facility: [phone]    Type of Anesthesia Anticipated: Local with MAC    Assessment & Plan     The proposed surgical procedure is considered LOW risk.    Problem List Items Addressed This Visit        Nervous and Auditory    Alcohol dependence with other alcohol-induced disorder (H)       Digestive    Alcoholic cirrhosis of liver with ascites (H)       Circulatory    Hypertension, benign essential, goal below 140/90       Musculoskeletal and Integumentary    Eczema, unspecified type    Relevant Medications    triamcinolone (KENALOG) 0.025 % cream       Urinary    CKD stage G3a/A2, GFR 45-59 and albumin creatinine ratio  mg/g (H)       Other    History of heroin abuse (H)      Other Visit Diagnoses     Preop general physical exam    -  Primary    Age-related cataract of both eyes, unspecified age-related cataract type        Visit for screening mammogram        Relevant Orders    MA SCREENING DIGITAL BILAT - Future  (s+30)            Risks and Recommendations:  The patient has the following additional risks and recommendations for perioperative complications:  Anemia/Bleeding/Clotting:    - Anemia and does not require treatment prior to surgery.   Social and Substance:    - Alcohol abuse and risk of withdrawal-advised to significantly reduce alcohol consumption if stopping is not possible 2 weeks  before surgery   - High tolerance to opioid analgesics due to Suboxone    - Patient is taking medications for chronic pain   - Active nicotine user, advised smoking cessation    Medication Instructions:   - Calcium Channel Blockers: May be continued on the day of surgery.   - nicotine gum: Take up to two hours before surgery   - Suboxone: Continue without modification. Notify Suboxone prescriber of upcoming surgery, patient will need an individualized perioperative plan.    - SSRIs, SNRIs, TCAs, Antipsychotics: Continue without modification.     RECOMMENDATION:  APPROVAL GIVEN to proceed with proposed procedure, without further diagnostic evaluation.    25 minutes spent on the date of the encounter doing chart review, history and exam, documentation and further activities per the note    Physician Attestation.  I agree with the information this note.    Electronically signed.    Noé Wolfe MD  Internal Medicine      Subjective     HPI related to upcoming procedure: cataract of both eyes       Preop Questions 7/25/2022   1. Have you ever had a heart attack or stroke? No   2. Have you ever had surgery on your heart or blood vessels, such as a stent placement, a coronary artery bypass, or surgery on an artery in your head, neck, heart, or legs? No   3. Do you have chest pain with activity? No   4. Do you have a history of  heart failure? No   5. Do you currently have a cold, bronchitis or symptoms of other infection? No   6. Do you have a cough, shortness of breath, or wheezing? No   7. Do you or anyone in your family have previous history of blood clots? No   8. Do you or does anyone in your family have a serious bleeding problem such as prolonged bleeding following surgeries or cuts? No   9. Have you ever had problems with anemia or been told to take iron pills? Yes due to chronic disease    10. Have you had any abnormal blood loss such as black, tarry or bloody stools, or abnormal vaginal bleeding? YES - in the  past due to alcohol use , 7 months ago   11. Have you ever had a blood transfusion? YES -due to alcohol use . 7 months ago   11a. Have you ever had a transfusion reaction? No   12. Are you willing to have a blood transfusion if it is medically needed before, during, or after your surgery? Yes   13. Have you or any of your relatives ever had problems with anesthesia? No   14. Do you have sleep apnea, excessive snoring or daytime drowsiness? No   15. Do you have any artifical heart valves or other implanted medical devices like a pacemaker, defibrillator, or continuous glucose monitor? No   16. Do you have artificial joints? YES - left knee    17. Are you allergic to latex? No     Health Care Directive:  Patient does not have a Health Care Directive or Living Will: Patient states has Advance Directive and will bring in a copy to clinic.  FULL CODE    Preoperative Review of :   reviewed - controlled substances reflected in medication list.      Status of Chronic Conditions:  See problem list for active medical problems.  Problems all longstanding and stable, except as noted/documented.  See ROS for pertinent symptoms related to these conditions.      Review of Systems  Constitutional, neuro, ENT, endocrine, pulmonary, cardiac, gastrointestinal, genitourinary, musculoskeletal, integument and psychiatric systems are negative, except as otherwise noted.    Patient Active Problem List    Diagnosis Date Noted     History of heroin abuse (H) 07/25/2022     Priority: Medium     Alcoholic cirrhosis of liver with ascites (H) 07/25/2022     Priority: Medium     Ascites due to alcoholic hepatitis 03/22/2022     Priority: Medium     CKD stage G3a/A2, GFR 45-59 and albumin creatinine ratio  mg/g (H) 06/19/2019     Priority: Medium     History of herpes genitalis 05/08/2019     Priority: Medium     Chronic hepatitis C without hepatic coma (H) 04/03/2019     Priority: Medium     Eczema, unspecified type 01/12/2018      Priority: Medium     Encounter for monitoring Suboxone maintenance therapy 01/11/2018     Priority: Medium     Low vitamin D level 03/03/2017     Priority: Medium     Alcohol dependence with other alcohol-induced disorder (H) 08/01/2016     Priority: Medium     Hypertension, benign essential, goal below 140/90 11/11/2015     Priority: Medium     Coagulopathy (H) 10/22/2015     Priority: Medium     Acute blood loss anemia 05/02/2015     Priority: Medium     Pain in joint, lower leg 02/07/2014     Priority: Medium     Anxiety associated with depression 01/17/2014     Priority: Medium     Nicotine dependence      Priority: Medium      Past Medical History:   Diagnosis Date     Anxiety associated with depression 1/17/2014     Ascites      Chronic hepatitis C without hepatic coma (H) 4/3/2019     Hepatic encephalopathy (H) 2/8/2014     Nicotine dependence      Past Surgical History:   Procedure Laterality Date     breast implants       ESOPHAGOSCOPY, GASTROSCOPY, DUODENOSCOPY (EGD), COMBINED  1/24/2014    Procedure: COMBINED ESOPHAGOSCOPY, GASTROSCOPY, DUODENOSCOPY (EGD);;  Surgeon: Tony Moctezuma MD;  Location:  GI     ESOPHAGOSCOPY, GASTROSCOPY, DUODENOSCOPY (EGD), COMBINED N/A 9/13/2018    Procedure: COMBINED ESOPHAGOSCOPY, GASTROSCOPY, DUODENOSCOPY (EGD);  EGD/Colonoscopy ;  Surgeon: Saige Villalta MD;  Location:  GI     ORTHOPEDIC SURGERY      TKR left     Current Outpatient Medications   Medication Sig Dispense Refill     ACE/ARB/ARNI NOT PRESCRIBED (INTENTIONAL) Please choose reason not prescribed from choices below.       amLODIPine (NORVASC) 5 MG tablet Take 1 tablet (5 mg) by mouth daily 90 tablet 1     blood glucose (NO BRAND SPECIFIED) test strip Use to test blood sugar 2 times daily or as directed. Contour next test strips 100 strip 3     blood glucose monitoring (NO BRAND SPECIFIED) meter device kit Use to test blood sugar 2 times daily or as directed. 1 kit 0     Blood Pressure  "Monitoring (BLOOD PRESSURE MONITOR/M CUFF) MISC Use to check blood pressure daily 1 each 0     buprenorphine-naloxone (SUBOXONE) 2-0.5 MG SUBL sublingual tablet Place 1 tablet under the tongue 2 times daily 60 tablet 0     calcium citrate-vitamin D (CITRACAL) 315-200 MG-UNIT TABS per tablet Take 2 tablets by mouth daily        cloNIDine (CATAPRES) 0.1 MG tablet Take 1 tablet (0.1 mg) by mouth 2 times daily 180 tablet 3     Ferrous Sulfate 324 (65 Fe) MG TBEC        lactulose encephalopathy (CHRONULAC) 10 GM/15ML SOLUTION Take 15 mLs (10 g) by mouth 2 times daily 946 mL 3     multivitamin, therapeutic (THERA-VIT) TABS tablet Take 1 tablet by mouth daily       mupirocin (BACTROBAN) 2 % external cream Apply topically 3 times daily 30 g 0     pramipexole (MIRAPEX) 0.125 MG tablet TAKE 1 TABLET(0.125 MG) BY MOUTH AT BEDTIME 90 tablet 3     sertraline (ZOLOFT) 100 MG tablet TAKE 1 TABLET(100 MG) BY MOUTH DAILY 90 tablet 3     traZODone (DESYREL) 50 MG tablet TAKE 1 TABLET(50 MG) BY MOUTH EVERY EVENING AS NEEDED FOR SLEEP 90 tablet 1     triamcinolone (KENALOG) 0.025 % cream Apply topically 2 times daily 45 g 0     valACYclovir (VALTREX) 500 MG tablet TAKE ONE TABLET BY MOUTH TWICE DAILY AS NEEDED 20 tablet 3       No Known Allergies     Social History     Tobacco Use     Smoking status: Current Every Day Smoker     Packs/day: 0.50     Years: 25.00     Pack years: 12.50     Types: Cigarettes     Smokeless tobacco: Never Used   Substance Use Topics     Alcohol use: No     Alcohol/week: 0.0 standard drinks     Family History   Problem Relation Age of Onset     Cancer Father      History   Drug Use No         Objective     BP (!) 149/74 (BP Location: Left arm, Patient Position: Sitting, Cuff Size: Adult Small)   Pulse 72   Temp 97.5  F (36.4  C) (Tympanic)   Resp 14   Ht 1.615 m (5' 3.58\")   Wt 62.8 kg (138 lb 6.4 oz)   LMP  (LMP Unknown)   SpO2 97%   BMI 24.07 kg/m      Physical Exam    GENERAL APPEARANCE: healthy, " alert and no distress     EYES: EOMI, PERRL     HENT: ear canals and TM's normal and nose and mouth without ulcers or lesions     NECK: no adenopathy, no asymmetry, masses, or scars and thyroid normal to palpation     RESP: lungs clear to auscultation - no rales, rhonchi or wheezes     CV: regular rates and rhythm, normal S1 S2, no S3 or S4 and no murmur, click or rub     ABDOMEN:  soft, nontender, no HSM or masses and bowel sounds normal     MS: extremities normal- no gross deformities noted, no evidence of inflammation in joints, FROM in all extremities.     SKIN: no suspicious lesions or rashes     NEURO: Normal strength and tone, sensory exam grossly normal, mentation intact and speech normal     PSYCH: mentation appears normal. and affect normal/bright     LYMPHATICS: No cervical adenopathy    Recent Labs   Lab Test 04/06/22  1227 03/14/22  1141 01/10/22  1417   HGB  --  9.7* 8.9*   PLT  --  130* 283   INR  --  1.20* 1.23*    138 139   POTASSIUM 5.3 4.2 4.6   CR 1.28* 1.59* 1.30*   A1C 6.5*  --  5.8*        Diagnostics:  No labs were ordered during this visit.   No EKG required for low risk surgery (cataract, skin procedure, breast biopsy, etc).    Revised Cardiac Risk Index (RCRI):  The patient has the following serious cardiovascular risks for perioperative complications:   - No serious cardiac risks = 0 points     RCRI Interpretation: 0 points: Class I (very low risk - 0.4% complication rate)           Signed Electronically by: Donna Villanueva PA-C  Reviewed and agree with assessment and plan above. Chino Gatica MD    Copy of this evaluation report is provided to requesting physician.      Answers for HPI/ROS submitted by the patient on 7/25/2022  If you checked off any problems, how difficult have these problems made it for you to do your work, take care of things at home, or get along with other people?: Somewhat difficult  PHQ9 TOTAL SCORE: 10

## 2022-07-25 NOTE — PATIENT INSTRUCTIONS
Preparing for Your Surgery  Getting started  A nurse will call you to review your health history and instructions. They will give you an arrival time based on your scheduled surgery time. Please be ready to share:    Your doctor's clinic name and phone number    Your medical, surgical and anesthesia history    A list of allergies and sensitivities    A list of medicines, including herbal treatments and over-the-counter drugs    Whether the patient has a legal guardian (ask how to send us the papers in advance)  Please tell us if you're pregnant--or if there's any chance you might be pregnant. Some surgeries may injure a fetus (unborn baby), so they require a pregnancy test. Surgeries that are safe for a fetus don't always need a test, and you can choose whether to have one.   If you have a child who's having surgery, please ask for a copy of Preparing for Your Child's Surgery.    Preparing for surgery    Within 30 days of surgery: Have a pre-op exam (sometimes called an H&P, or History and Physical). This can be done at a clinic or pre-operative center.  ? If you're having a , you may not need this exam. Talk to your care team.    At your pre-op exam, talk to your care team about all medicines you take. If you need to stop any medicines before surgery, ask when to start taking them again.  ? We do this for your safety. Many medicines can make you bleed too much during surgery. Some change how well surgery (anesthesia) drugs work.    Call your insurance company to let them know you're having surgery. (If you don't have insurance, call [phone].)    Call your clinic if there's any change in your health. This includes signs of a cold or flu (sore throat, runny nose, cough, rash, fever). It also includes a scrape or scratch near the surgery site.    If you have questions on the day of surgery, call your hospital or surgery center.  COVID testing  You may need to be tested for COVID-19 before having  surgery. If so, we will give you instructions.  Eating and drinking guidelines  For your safety: Unless your surgeon tells you otherwise, follow the guidelines below.    Eat and drink as usual until 8 hours before surgery. After that, no food or milk.    Drink clear liquids until 2 hours before surgery. These are liquids you can see through, like water, Gatorade and Propel Water. You may also have black coffee and tea (no cream or milk).    Nothing by mouth within 2 hours of surgery. This includes gum, candy and breath mints.    If you drink alcohol: Stop drinking it the night before surgery.    If your care team tells you to take medicine on the morning of surgery, it's okay to take it with a sip of water.  Preventing infection    Shower or bathe the night before and morning of your surgery. Follow the instructions your clinic gave you. (If no instructions, use regular soap.)    Don't shave or clip hair near your surgery site. We'll remove the hair if needed.    Don't smoke or vape the morning of surgery. You may chew nicotine gum up to 2 hours before surgery. A nicotine patch is okay.  ? Note: Some surgeries require you to completely quit smoking and nicotine. Check with your surgeon.    Your care team will make every effort to keep you safe from infection. We will:  ? Clean our hands often with soap and water (or an alcohol-based hand rub).  ? Clean the skin at your surgery site with a special soap that kills germs.  ? Give you a special gown to keep you warm. (Cold raises the risk of infection.)  ? Wear special hair covers, masks, gowns and gloves during surgery.  ? Give antibiotic medicine, if prescribed. Not all surgeries need antibiotics.  What to bring on the day of surgery    Photo ID and insurance card    Copy of your health care directive, if you have one    Glasses and hearing aides (bring cases)  ? You can't wear contacts during surgery    Inhaler and eye drops, if you use them (tell us about these when  you arrive)    CPAP machine or breathing device, if you use them    A few personal items, if spending the night    If you have . . .  ? A pacemaker, ICD (cardiac defibrillator) or other implant: Bring the ID card.  ? An implanted stimulator: Bring the remote control.  ? A legal guardian: Bring a copy of the certified (court-stamped) guardianship papers.  Please remove any jewelry, including body piercings. Leave jewelry and other valuables at home.  If you're going home the day of surgery    You must have a responsible adult drive you home. They should stay with you overnight as well.    If you don't have someone to stay with you, and you aren't safe to go home alone, we may keep you overnight. Insurance often won't pay for this.  After surgery  If it's hard to control your pain or you need more pain medicine, please call your surgeon's office.  Questions?   If you have any questions for your care team, list them here: _________________________________________________________________________________________________________________________________________________________________________ ____________________________________ ____________________________________ ____________________________________  For informational purposes only. Not to replace the advice of your health care provider. Copyright   2003, 2019 [facility]. All rights reserved. Clinically reviewed by Teri Zepeda MD. WhatsApp [id number] - REV 07/21.    Preparing for Your Surgery  Getting started  A nurse will call you to review your health history and instructions. They will give you an arrival time based on your scheduled surgery time. Please be ready to share:    Your doctor's clinic name and phone number    Your medical, surgical and anesthesia history    A list of allergies and sensitivities    A list of medicines, including herbal treatments and over-the-counter drugs    Whether the patient has a legal guardian (ask how to send us the  papers in advance)  Please tell us if you're pregnant--or if there's any chance you might be pregnant. Some surgeries may injure a fetus (unborn baby), so they require a pregnancy test. Surgeries that are safe for a fetus don't always need a test, and you can choose whether to have one.   If you have a child who's having surgery, please ask for a copy of Preparing for Your Child's Surgery.    Preparing for surgery    Within 30 days of surgery: Have a pre-op exam (sometimes called an H&P, or History and Physical). This can be done at a clinic or pre-operative center.  ? If you're having a , you may not need this exam. Talk to your care team.    At your pre-op exam, talk to your care team about all medicines you take. If you need to stop any medicines before surgery, ask when to start taking them again.  ? We do this for your safety. Many medicines can make you bleed too much during surgery. Some change how well surgery (anesthesia) drugs work.    Call your insurance company to let them know you're having surgery. (If you don't have insurance, call [phone].)    Call your clinic if there's any change in your health. This includes signs of a cold or flu (sore throat, runny nose, cough, rash, fever). It also includes a scrape or scratch near the surgery site.    If you have questions on the day of surgery, call your hospital or surgery center.  COVID testing  You may need to be tested for COVID-19 before having surgery. If so, we will give you instructions.  Eating and drinking guidelines  For your safety: Unless your surgeon tells you otherwise, follow the guidelines below.    Eat and drink as usual until 8 hours before surgery. After that, no food or milk.    Drink clear liquids until 2 hours before surgery. These are liquids you can see through, like water, Gatorade and Propel Water. You may also have black coffee and tea (no cream or milk).    Nothing by mouth within 2 hours of surgery. This includes  gum, candy and breath mints.    If you drink alcohol: Stop drinking it the night before surgery.    If your care team tells you to take medicine on the morning of surgery, it's okay to take it with a sip of water.  Preventing infection    Shower or bathe the night before and morning of your surgery. Follow the instructions your clinic gave you. (If no instructions, use regular soap.)    Don't shave or clip hair near your surgery site. We'll remove the hair if needed.    Don't smoke or vape the morning of surgery. You may chew nicotine gum up to 2 hours before surgery. A nicotine patch is okay.  ? Note: Some surgeries require you to completely quit smoking and nicotine. Check with your surgeon.    Your care team will make every effort to keep you safe from infection. We will:  ? Clean our hands often with soap and water (or an alcohol-based hand rub).  ? Clean the skin at your surgery site with a special soap that kills germs.  ? Give you a special gown to keep you warm. (Cold raises the risk of infection.)  ? Wear special hair covers, masks, gowns and gloves during surgery.  ? Give antibiotic medicine, if prescribed. Not all surgeries need antibiotics.  What to bring on the day of surgery    Photo ID and insurance card    Copy of your health care directive, if you have one    Glasses and hearing aides (bring cases)  ? You can't wear contacts during surgery    Inhaler and eye drops, if you use them (tell us about these when you arrive)    CPAP machine or breathing device, if you use them    A few personal items, if spending the night    If you have . . .  ? A pacemaker, ICD (cardiac defibrillator) or other implant: Bring the ID card.  ? An implanted stimulator: Bring the remote control.  ? A legal guardian: Bring a copy of the certified (court-stamped) guardianship papers.  Please remove any jewelry, including body piercings. Leave jewelry and other valuables at home.  If you're going home the day of surgery    You  must have a responsible adult drive you home. They should stay with you overnight as well.    If you don't have someone to stay with you, and you aren't safe to go home alone, we may keep you overnight. Insurance often won't pay for this.  After surgery  If it's hard to control your pain or you need more pain medicine, please call your surgeon's office.  Questions?   If you have any questions for your care team, list them here: _________________________________________________________________________________________________________________________________________________________________________ ____________________________________ ____________________________________ ____________________________________  For informational purposes only. Not to replace the advice of your health care provider. Copyright   2003, 2019 [employer] [facility]. All rights reserved. Clinically reviewed by Teri Zepeda MD. [employer] [id number] - REV 07/21.

## 2022-07-25 NOTE — NURSING NOTE
Prior to immunization administration, verified patients identity using patient s name and date of birth. Please see Immunization Activity for additional information.     Screening Questionnaire for Adult Immunization    Are you sick today?   No   Do you have allergies to medications, food, a vaccine component or latex?   No   Have you ever had a serious reaction after receiving a vaccination?   No   Do you have a long-term health problem with heart, lung, kidney, or metabolic disease (e.g., diabetes), asthma, a blood disorder, no spleen, complement component deficiency, a cochlear implant, or a spinal fluid leak?  Are you on long-term aspirin therapy?   No   Do you have cancer, leukemia, HIV/AIDS, or any other immune system problem?   No   Do you have a parent, brother, or sister with an immune system problem?   No   In the past 3 months, have you taken medications that affect  your immune system, such as prednisone, other steroids, or anticancer drugs; drugs for the treatment of rheumatoid arthritis, Crohn s disease, or psoriasis; or have you had radiation treatments?   No   Have you had a seizure, or a brain or other nervous system problem?   No   During the past year, have you received a transfusion of blood or blood    products, or been given immune (gamma) globulin or antiviral drug?   No   For women: Are you pregnant or is there a chance you could become       pregnant during the next month?   No   Have you received any vaccinations in the past 4 weeks?   No     Immunization questionnaire answers were all negative.        Per orders of Dr. Villanueva, injection of Tdap and PCV 20given by Gillian Bravo MA. Patient instructed to remain in clinic for 15 minutes afterwards, and to report any adverse reaction to me immediately.       Screening performed by Gillian Bravo MA on 7/25/2022 at 12:22 PM.

## 2022-12-08 ENCOUNTER — TELEPHONE (OUTPATIENT)
Dept: FAMILY MEDICINE | Facility: CLINIC | Age: 67
End: 2022-12-08

## 2022-12-08 NOTE — TELEPHONE ENCOUNTER
Reason for Call:  Other prescription    Detailed comments: Josette would like a prescription refill on Buprenorphine.    Prefers [facility].     Would like a call update on the prescription    Phone Number Patient can be reached at: Home number on file [phone] (home)    Best Time: Any    Can we leave a detailed message on this number? YES    Call taken on 12/8/2022 at 1:00 PM by Kalina Mendez

## 2022-12-08 NOTE — TELEPHONE ENCOUNTER
4-6-22 last exam with PCP  Return in about 3 months (around 7/6/2022) for Physical Exam, Lab Work.    This was just sent on 12-4.  Needs appt with PCP, please call and schedule follow up appt and notify pt rx already sent.    Kalina DC RN  [facility]

## 2023-02-04 ENCOUNTER — TRANSFERRED RECORDS (OUTPATIENT)
Dept: HEALTH INFORMATION MANAGEMENT | Facility: CLINIC | Age: 68
End: 2023-02-04

## 2023-02-06 ENCOUNTER — TELEPHONE (OUTPATIENT)
Dept: FAMILY MEDICINE | Facility: CLINIC | Age: 68
End: 2023-02-06

## 2023-02-06 NOTE — TELEPHONE ENCOUNTER
Patient Quality Outreach    Patient is due for the following:   Diabetes -  Eye Exam and Microalbumin  Physical Annual Wellness Visit      Topic Date Due     Hepatitis B Vaccine (3 of 3 - Risk 3-dose series) 08/25/2008     COVID-19 Vaccine (4 - Booster for Destinee series) 08/08/2022     Flu Vaccine (1) 09/01/2022       Next Steps:   Schedule a Annual Wellness Visit    Type of outreach:    Sent [employer] message.    Next Steps:  Reach out within 90 days via Letter.    Max number of attempts reached: No. Will try again in 90 days if patient still on fail list.    Questions for provider review:    None     Yoselin Fitch [facility]  Chart routed to Care Team.

## 2023-03-18 DIAGNOSIS — I10 HYPERTENSION, BENIGN ESSENTIAL, GOAL BELOW 140/90: ICD-10-CM

## 2023-03-21 RX ORDER — AMLODIPINE BESYLATE 5 MG/1
TABLET ORAL
Qty: 90 TABLET | Refills: 0 | Status: SHIPPED | OUTPATIENT
Start: 2023-03-21 | End: 2023-09-18

## 2023-03-21 NOTE — TELEPHONE ENCOUNTER
"Requested Prescriptions   Pending Prescriptions Disp Refills     amLODIPine (NORVASC) 5 MG tablet [Pharmacy Med Name: AMLODIPINE BESYLATE 5MG TABLETS] 90 tablet 0     Sig: TAKE 1 TABLET(5 MG) BY MOUTH DAILY       Calcium Channel Blockers Protocol  Failed - 3/18/2023  1:58 PM        Failed - Blood pressure under 140/90 in past 12 months     BP Readings from Last 3 Encounters:   07/25/22 (!) 149/74   06/16/22 121/61   04/06/22 118/68                 Failed - Normal serum creatinine on file in past 12 months     Recent Labs   Lab Test 04/06/22  1227   CR 1.28*       Ok to refill medication if creatinine is low          Passed - Recent (12 mo) or future (30 days) visit within the authorizing provider's specialty     Patient has had an office visit with the authorizing provider or a provider within the authorizing providers department within the previous 12 mos or has a future within next 30 days. See \"Patient Info\" tab in inbasket, or \"Choose Columns\" in Meds & Orders section of the refill encounter.              Passed - Medication is active on med list        Passed - Patient is age 18 or older        Passed - No active pregnancy on record        Passed - No positive pregnancy test in past 12 months         Routing refill request to provider for review/approval because medication did not pass protocol.    Pt has a appointment on 04/17/23    Casandra Miller RN  [facility]    "

## 2023-04-17 ENCOUNTER — OFFICE VISIT (OUTPATIENT)
Dept: FAMILY MEDICINE | Facility: CLINIC | Age: 68
End: 2023-04-17
Payer: MEDICARE

## 2023-04-17 ENCOUNTER — TELEPHONE (OUTPATIENT)
Dept: FAMILY MEDICINE | Facility: CLINIC | Age: 68
End: 2023-04-17

## 2023-04-17 VITALS
OXYGEN SATURATION: 99 % | HEIGHT: 64 IN | HEART RATE: 75 BPM | RESPIRATION RATE: 15 BRPM | TEMPERATURE: 97.8 F | DIASTOLIC BLOOD PRESSURE: 68 MMHG | WEIGHT: 133 LBS | SYSTOLIC BLOOD PRESSURE: 142 MMHG | BODY MASS INDEX: 22.71 KG/M2

## 2023-04-17 DIAGNOSIS — Z00.00 MEDICARE ANNUAL WELLNESS VISIT, SUBSEQUENT: Primary | ICD-10-CM

## 2023-04-17 DIAGNOSIS — F17.210 CIGARETTE NICOTINE DEPENDENCE WITHOUT COMPLICATION: ICD-10-CM

## 2023-04-17 DIAGNOSIS — N18.31 TYPE 2 DIABETES MELLITUS WITH STAGE 3A CHRONIC KIDNEY DISEASE, WITHOUT LONG-TERM CURRENT USE OF INSULIN (H): ICD-10-CM

## 2023-04-17 DIAGNOSIS — Z79.899 ENCOUNTER FOR MONITORING SUBOXONE MAINTENANCE THERAPY: ICD-10-CM

## 2023-04-17 DIAGNOSIS — F41.8 ANXIETY ASSOCIATED WITH DEPRESSION: ICD-10-CM

## 2023-04-17 DIAGNOSIS — N18.31 CKD STAGE G3A/A2, GFR 45-59 AND ALBUMIN CREATININE RATIO 30-299 MG/G (H): ICD-10-CM

## 2023-04-17 DIAGNOSIS — F10.288 ALCOHOL DEPENDENCE WITH OTHER ALCOHOL-INDUCED DISORDER (H): ICD-10-CM

## 2023-04-17 DIAGNOSIS — L57.0 ACTINIC KERATOSIS: ICD-10-CM

## 2023-04-17 DIAGNOSIS — E11.22 TYPE 2 DIABETES MELLITUS WITH STAGE 3A CHRONIC KIDNEY DISEASE, WITHOUT LONG-TERM CURRENT USE OF INSULIN (H): ICD-10-CM

## 2023-04-17 DIAGNOSIS — Z51.81 ENCOUNTER FOR MONITORING SUBOXONE MAINTENANCE THERAPY: ICD-10-CM

## 2023-04-17 PROBLEM — E11.9 DIABETES MELLITUS, TYPE 2 (H): Status: ACTIVE | Noted: 2023-04-17

## 2023-04-17 LAB
ALBUMIN SERPL BCG-MCNC: 3.8 G/DL (ref 3.5–5.2)
ALP SERPL-CCNC: 118 U/L (ref 35–104)
ALT SERPL W P-5'-P-CCNC: 32 U/L (ref 10–35)
AMMONIA PLAS-SCNC: 14 UMOL/L (ref 11–51)
ANION GAP SERPL CALCULATED.3IONS-SCNC: 14 MMOL/L (ref 7–15)
AST SERPL W P-5'-P-CCNC: 79 U/L (ref 10–35)
BILIRUB SERPL-MCNC: 0.7 MG/DL
BUN SERPL-MCNC: 25.9 MG/DL (ref 8–23)
CALCIUM SERPL-MCNC: 8.7 MG/DL (ref 8.8–10.2)
CHLORIDE SERPL-SCNC: 106 MMOL/L (ref 98–107)
CHOLEST SERPL-MCNC: 208 MG/DL
CREAT SERPL-MCNC: 1.32 MG/DL (ref 0.51–0.95)
CREAT UR-MCNC: 22.6 MG/DL
DEPRECATED HCO3 PLAS-SCNC: 18 MMOL/L (ref 22–29)
ERYTHROCYTE [DISTWIDTH] IN BLOOD BY AUTOMATED COUNT: 13.8 % (ref 10–15)
GFR SERPL CREATININE-BSD FRML MDRD: 44 ML/MIN/1.73M2
GLUCOSE SERPL-MCNC: 133 MG/DL (ref 70–99)
HBA1C MFR BLD: 5.4 % (ref 0–5.6)
HCT VFR BLD AUTO: 31.1 % (ref 35–47)
HDLC SERPL-MCNC: 139 MG/DL
HGB BLD-MCNC: 10.2 G/DL (ref 11.7–15.7)
INR PPP: 1.07 (ref 0.85–1.15)
LDLC SERPL CALC-MCNC: 39 MG/DL
MCH RBC QN AUTO: 32.9 PG (ref 26.5–33)
MCHC RBC AUTO-ENTMCNC: 32.8 G/DL (ref 31.5–36.5)
MCV RBC AUTO: 100 FL (ref 78–100)
MICROALBUMIN UR-MCNC: 1153 MG/L
MICROALBUMIN/CREAT UR: 5101.77 MG/G CR (ref 0–25)
NONHDLC SERPL-MCNC: 69 MG/DL
PLATELET # BLD AUTO: 75 10E3/UL (ref 150–450)
POTASSIUM SERPL-SCNC: 4.6 MMOL/L (ref 3.4–5.3)
PROT SERPL-MCNC: 7.1 G/DL (ref 6.4–8.3)
RBC # BLD AUTO: 3.1 10E6/UL (ref 3.8–5.2)
SODIUM SERPL-SCNC: 138 MMOL/L (ref 136–145)
TRIGL SERPL-MCNC: 148 MG/DL
WBC # BLD AUTO: 4.8 10E3/UL (ref 4–11)

## 2023-04-17 PROCEDURE — 82043 UR ALBUMIN QUANTITATIVE: CPT | Performed by: FAMILY MEDICINE

## 2023-04-17 PROCEDURE — 83036 HEMOGLOBIN GLYCOSYLATED A1C: CPT | Performed by: FAMILY MEDICINE

## 2023-04-17 PROCEDURE — 80053 COMPREHEN METABOLIC PANEL: CPT | Performed by: FAMILY MEDICINE

## 2023-04-17 PROCEDURE — 99207 PR ANNUAL WELLNESS VISIT, PPS, SUBSEQUENT STAT: CPT | Performed by: FAMILY MEDICINE

## 2023-04-17 PROCEDURE — 82570 ASSAY OF URINE CREATININE: CPT | Performed by: FAMILY MEDICINE

## 2023-04-17 PROCEDURE — 82140 ASSAY OF AMMONIA: CPT | Performed by: FAMILY MEDICINE

## 2023-04-17 PROCEDURE — 99214 OFFICE O/P EST MOD 30 MIN: CPT | Mod: 25 | Performed by: FAMILY MEDICINE

## 2023-04-17 PROCEDURE — 80061 LIPID PANEL: CPT | Performed by: FAMILY MEDICINE

## 2023-04-17 PROCEDURE — 85027 COMPLETE CBC AUTOMATED: CPT | Performed by: FAMILY MEDICINE

## 2023-04-17 PROCEDURE — 36415 COLL VENOUS BLD VENIPUNCTURE: CPT | Performed by: FAMILY MEDICINE

## 2023-04-17 PROCEDURE — 85610 PROTHROMBIN TIME: CPT | Performed by: FAMILY MEDICINE

## 2023-04-17 RX ORDER — NALTREXONE HYDROCHLORIDE 50 MG/1
50 TABLET, FILM COATED ORAL DAILY
Qty: 30 TABLET | Refills: 11 | Status: SHIPPED | OUTPATIENT
Start: 2023-04-17 | End: 2023-06-02

## 2023-04-17 ASSESSMENT — ENCOUNTER SYMPTOMS
SHORTNESS OF BREATH: 1
HEMATOCHEZIA: 0
EYE PAIN: 0
HEMATURIA: 0
MYALGIAS: 0
WEAKNESS: 0
SORE THROAT: 0
DIZZINESS: 0
ARTHRALGIAS: 1
DIARRHEA: 0
CONSTIPATION: 0
FREQUENCY: 1
DYSURIA: 0
JOINT SWELLING: 1
BREAST MASS: 0
NERVOUS/ANXIOUS: 1
CHILLS: 1
COUGH: 1
PARESTHESIAS: 0
ABDOMINAL PAIN: 0
PALPITATIONS: 1
HEADACHES: 0
HEARTBURN: 0
FEVER: 0
NAUSEA: 0

## 2023-04-17 ASSESSMENT — ACTIVITIES OF DAILY LIVING (ADL): CURRENT_FUNCTION: NO ASSISTANCE NEEDED

## 2023-04-17 NOTE — PATIENT INSTRUCTIONS
Call in 1 month [phone] for me to call back    Contact Kimberlee    Reduce sertraline 50 mg daily    Start naltrexone 50 mg daily to reduce alcohol craving.

## 2023-04-17 NOTE — PROGRESS NOTES
Assessment & Plan     Medicare annual wellness visit, subsequent  Not doing well, unable to stay sober    Encounter for monitoring Suboxone maintenance therapy  Using as needed    CKD stage G3a/A2, GFR 45-59 and albumin creatinine ratio  mg/g (H)  worse  - Albumin Random Urine Quantitative with Creat Ratio; Future  - CBC with platelets; Future  - Albumin Random Urine Quantitative with Creat Ratio  - CBC with platelets    Type 2 diabetes mellitus with stage 3a chronic kidney disease, without long-term current use of insulin (H)  Unknown control  - HEMOGLOBIN A1C; Future  - Albumin Random Urine Quantitative with Creat Ratio; Future  - Comprehensive metabolic panel (BMP + Alb, Alk Phos, ALT, AST, Total. Bili, TP); Future  - Lipid panel reflex to direct LDL Fasting; Future  - HEMOGLOBIN A1C  - Albumin Random Urine Quantitative with Creat Ratio  - Comprehensive metabolic panel (BMP + Alb, Alk Phos, ALT, AST, Total. Bili, TP)  - Lipid panel reflex to direct LDL Fasting  - INR; Future  - INR    Alcohol dependence with other alcohol-induced disorder (H)  worse  - Ammonia; Future  - naltrexone (DEPADE/REVIA) 50 MG tablet; Take 1 tablet (50 mg) by mouth daily  - Ammonia  - INR; Future  - INR    Cigarette nicotine dependence without complication  Not able to reduce now    Anxiety associated with depression  Worse, not suicidal  - sertraline (ZOLOFT) 50 MG tablet; Take 1 tablet (50 mg) by mouth daily    Actinic keratosis  face  - Adult Dermatology Referral; Future    Review of external notes as documented elsewhere in note  Ordering of each unique test  Prescription drug management  30 minutes spent by me on the date of the encounter doing chart review, history and exam, documentation and further activities per the note     Nicotine/Tobacco Cessation:  She reports that she has been smoking cigarettes. She has a 12.50 pack-year smoking history. She has never used smokeless tobacco.  Nicotine/Tobacco Cessation Plan:  "  Information offered: Patient not interested at this time      Patient Instructions   Call in 1 month [phone] for me to call back    Contact Kimberlee    Reduce sertraline 50 mg daily    Start naltrexone 50 mg daily to reduce alcohol craving.       Ishaan West MD  [facility]    Shreya Finch is a 68 year old, presenting for the following health issues:  Wellness Visit        4/17/2023    12:07 PM   Additional Questions   Roomed by Elizabeth   Accompanied by Carlos-     Healthy Habits:     In general, how would you rate your overall health?  Fair    Frequency of exercise:  None    Do you usually eat at least 4 servings of fruit and vegetables a day, include whole grains    & fiber and avoid regularly eating high fat or \"junk\" foods?  Yes    Taking medications regularly:  No    Barriers to taking medications:  Problems remembering to take them    Medication side effects:  None    Ability to successfully perform activities of daily living:  No assistance needed    Home Safety:  Throw rugs in the hallway    Hearing Impairment:  No hearing concerns    In the past 6 months, have you been bothered by leaking of urine? Yes    In general, how would you rate your overall mental or emotional health?  Fair      PHQ-2 Total Score: 1    Additional concerns today:  No       Diabetes Follow-up      How often are you checking your blood sugar? Not at all    What concerns do you have today about your diabetes? Other: how is control     Do you have any of these symptoms? (Select all that apply)  Numbness in feet and Weight loss    Have you had a diabetic eye exam in the last 12 months? no        BP Readings from Last 2 Encounters:   04/17/23 (!) 142/68   07/25/22 (!) 149/74     Hemoglobin A1C (%)   Date Value   04/17/2023 5.4   04/06/2022 6.5 (H)   06/05/2019 6.0 (H)   04/27/2018 5.9 (H)     LDL Cholesterol Calculated (mg/dL)   Date Value   04/17/2023 39   10/01/2021 31   11/22/2017 31 "   07/28/2016 41       Depression and Anxiety Follow-Up    How are you doing with your depression since your last visit? No change    How are you doing with your anxiety since your last visit?  Worsened     Are you having other symptoms that might be associated with depression or anxiety? No    Have you had a significant life event? Health Concerns     Do you have any concerns with your use of alcohol or other drugs? Yes:  alcohol cravings, wonders about naltrexone    Social History     Tobacco Use     Smoking status: Every Day     Packs/day: 0.50     Years: 25.00     Pack years: 12.50     Types: Cigarettes     Smokeless tobacco: Never   Substance Use Topics     Alcohol use: No     Alcohol/week: 0.0 standard drinks of alcohol     Drug use: No         4/6/2022    10:43 AM 4/20/2022     9:54 AM 7/25/2022    11:03 AM   PHQ   PHQ-9 Total Score 11 13 10   Q9: Thoughts of better off dead/self-harm past 2 weeks Not at all Not at all Not at all         6/19/2020    10:24 AM 2/19/2021     8:16 AM 4/6/2022    10:43 AM   GABRIELLE-7 SCORE   Total Score   5 (mild anxiety)   Total Score 7 7 5     Chronic Kidney Disease Follow-up      Do you take any over the counter pain medicine?: No    Medication Followup of suboxone 2/0.5    Taking Medication as prescribed: NO-as needed, prescribed twice daily #60, usually lasted ~2 mos    Side Effects:  None    Medication Helping Symptoms:  Yes, helps with stress    Skin Lesion  Onset/Duration: months   Description  Location: face   Color: skin colored  Border description: raised, scaly, inflamed  Character: flakey, red  Itching: mild  Bleeding:  YES- could not stop picking  Intensity:  moderate  Progression of Symptoms:  same and constant  Accompanying signs and symptoms:   Bleeding: sometimes, not now  Scaling: YES  Excessive sun exposure/tanning: YES  Sunscreen used: No  History:           Any previous history of skin cancer: No  Any family history of melanoma: No  Previous episodes of similar  "lesion: YES  Precipitating or alleviating factors: better when not scratching  Therapies tried and outcome: hydrocortisone cream -  not effective        Review of Systems   Constitutional: Positive for chills. Negative for fever.   HENT: Positive for congestion. Negative for ear pain, hearing loss and sore throat.    Eyes: Negative for pain and visual disturbance.   Respiratory: Positive for cough and shortness of breath.    Cardiovascular: Positive for palpitations and peripheral edema. Negative for chest pain.   Gastrointestinal: Negative for abdominal pain, constipation, diarrhea, heartburn, hematochezia and nausea.   Breasts:  Negative for tenderness, breast mass and discharge.   Genitourinary: Positive for frequency and urgency. Negative for dysuria, genital sores, hematuria, pelvic pain, vaginal bleeding and vaginal discharge.   Musculoskeletal: Positive for arthralgias and joint swelling. Negative for myalgias.   Skin: Negative for rash.   Neurological: Negative for dizziness, weakness, headaches and paresthesias.   Psychiatric/Behavioral: Positive for mood changes. The patient is nervous/anxious.         Objective    BP (!) 142/68 (BP Location: Left arm, Patient Position: Sitting, Cuff Size: Adult Regular)   Pulse 75   Temp 97.8  F (36.6  C) (Temporal)   Resp 15   Ht 1.613 m (5' 3.5\")   Wt 60.3 kg (133 lb)   LMP  (LMP Unknown)   SpO2 99%   BMI 23.19 kg/m    Body mass index is 23.19 kg/m .  Physical Exam   GENERAL: mild distress, frail and fatigued  EYES: Eyes grossly normal to inspection, PERRL and conjunctivae and sclerae normal  HENT: ear canals and TM's normal, nose and mouth without ulcers or lesions  NECK: no adenopathy, no asymmetry, masses, or scars and thyroid normal to palpation  RESP: lungs clear to auscultation - no rales, rhonchi or wheezes  CV: regular rate and rhythm, normal S1 S2, no S3 or S4, no murmur, click or rub, no peripheral edema and peripheral pulses strong  ABDOMEN: soft, " nontender, without hepatosplenomegaly or masses, bowel sounds normal and distended- ?fluid/ascities  MS: RLE and LLE exam shows edema 1-2+  SKIN: ecchymoses - arms and hands  NEURO: weakness of legs and mentation intact  PSYCH: mentation appears normal, tearful, fatigued and judgement and insight impaired

## 2023-04-17 NOTE — TELEPHONE ENCOUNTER
Dr Jenna Landaverde is asking if you would sign the orders from for pt    Shelli Live RN   [facility]

## 2023-04-19 ENCOUNTER — MYC MEDICAL ADVICE (OUTPATIENT)
Dept: FAMILY MEDICINE | Facility: CLINIC | Age: 68
End: 2023-04-19
Payer: MEDICARE

## 2023-04-20 ENCOUNTER — MYC MEDICAL ADVICE (OUTPATIENT)
Dept: FAMILY MEDICINE | Facility: CLINIC | Age: 68
End: 2023-04-20
Payer: MEDICARE

## 2023-04-20 ENCOUNTER — TELEPHONE (OUTPATIENT)
Dept: FAMILY MEDICINE | Facility: CLINIC | Age: 68
End: 2023-04-20
Payer: MEDICARE

## 2023-04-20 NOTE — TELEPHONE ENCOUNTER
Per Miguelangel note, attempted to conatct [phone] only to reach voicemail after not ringing and calling 3 times. Called home number but straight to voicemail without ringing.    Pt  did call back after multiple attempts to reach and stating she is very sick and that they got labs over to us yesterday wondering why her labs are all over the place and that with her current symptoms that are going on they are going to call 911. Before I could say anything he also stated she has started reporting having some difficulty breathing which is when triager advised to call 911.    Please see TE from today as well as mychart message associated with this encounter.    Thanks!  Andrés Thomas RN   [facility]

## 2023-04-20 NOTE — TELEPHONE ENCOUNTER
Patient started taking naltrexone today   is concerned she is having a side effects with medications  Patient and  called  Patient having  abdominal pain, diarrhea   reports she has been drinking Vodka  5-6 oz    Patient incontinent bowel and bladder   Denied SOB  Please call  # [phone]    RALEIGH Means

## 2023-04-21 NOTE — TELEPHONE ENCOUNTER
Called spouse; patient ambulanced to [facility] ER. Agreed that naltrexone led to bad reaction diarrhea and abdominal cramps, will stop. Spouse had noted urinary albumen over 5000- will need kidney evaluation. Will contact in the morning for update. Ishaan West MD

## 2023-04-21 NOTE — TELEPHONE ENCOUNTER
Liver tests not too bad, but the spot urinary albumen is worrisome. Called spouse- patient in ER. I will call tomorrow for update.  DEX ELIAS

## 2023-04-29 NOTE — RESULT ENCOUNTER NOTE
Haroldo Nathan: glad to see you're back home: hope you are feeling better. Please schedule followup appointment to recheck kidney function, as there was an abnormal loss of albumen in urine, and to talk about how to stay sober. Look forward to seeing you.     Ishaan ELIAS

## 2023-05-04 DIAGNOSIS — Z79.899 ENCOUNTER FOR MONITORING SUBOXONE MAINTENANCE THERAPY: ICD-10-CM

## 2023-05-04 DIAGNOSIS — Z51.81 ENCOUNTER FOR MONITORING SUBOXONE MAINTENANCE THERAPY: ICD-10-CM

## 2023-05-05 DIAGNOSIS — F10.288 ALCOHOL DEPENDENCE WITH OTHER ALCOHOL-INDUCED DISORDER (H): ICD-10-CM

## 2023-05-08 RX ORDER — BUPRENORPHINE HYDROCHLORIDE AND NALOXONE HYDROCHLORIDE DIHYDRATE 2; .5 MG/1; MG/1
TABLET SUBLINGUAL
Qty: 30 TABLET | Refills: 0 | Status: SHIPPED | OUTPATIENT
Start: 2023-05-08 | End: 2023-06-19

## 2023-05-08 NOTE — TELEPHONE ENCOUNTER
Left generic vm for pt and sent a Mychart message    Shelli Live RN   [facility]

## 2023-05-09 ENCOUNTER — MYC MEDICAL ADVICE (OUTPATIENT)
Dept: FAMILY MEDICINE | Facility: CLINIC | Age: 68
End: 2023-05-09
Payer: MEDICARE

## 2023-06-02 ENCOUNTER — OFFICE VISIT (OUTPATIENT)
Dept: FAMILY MEDICINE | Facility: CLINIC | Age: 68
End: 2023-06-02
Payer: MEDICARE

## 2023-06-02 VITALS
DIASTOLIC BLOOD PRESSURE: 64 MMHG | OXYGEN SATURATION: 97 % | RESPIRATION RATE: 12 BRPM | TEMPERATURE: 97.9 F | SYSTOLIC BLOOD PRESSURE: 138 MMHG | WEIGHT: 131.5 LBS | HEIGHT: 64 IN | BODY MASS INDEX: 22.45 KG/M2 | HEART RATE: 75 BPM

## 2023-06-02 DIAGNOSIS — G47.00 INSOMNIA, UNSPECIFIED TYPE: ICD-10-CM

## 2023-06-02 DIAGNOSIS — D69.6 THROMBOCYTOPENIA (H): ICD-10-CM

## 2023-06-02 DIAGNOSIS — I10 HYPERTENSION, BENIGN ESSENTIAL, GOAL BELOW 140/90: ICD-10-CM

## 2023-06-02 DIAGNOSIS — I89.0 LYMPHEDEMA: Primary | ICD-10-CM

## 2023-06-02 DIAGNOSIS — K70.31 ALCOHOLIC CIRRHOSIS OF LIVER WITH ASCITES (H): ICD-10-CM

## 2023-06-02 DIAGNOSIS — F41.8 ANXIETY ASSOCIATED WITH DEPRESSION: ICD-10-CM

## 2023-06-02 PROCEDURE — 99214 OFFICE O/P EST MOD 30 MIN: CPT | Performed by: FAMILY MEDICINE

## 2023-06-02 RX ORDER — PRAMIPEXOLE DIHYDROCHLORIDE 0.12 MG/1
TABLET ORAL
Qty: 90 TABLET | Refills: 3 | Status: SHIPPED | OUTPATIENT
Start: 2023-06-02 | End: 2024-07-24

## 2023-06-02 RX ORDER — CLONIDINE HYDROCHLORIDE 0.1 MG/1
0.1 TABLET ORAL 2 TIMES DAILY
Qty: 180 TABLET | Refills: 3 | Status: SHIPPED | OUTPATIENT
Start: 2023-06-02 | End: 2024-07-01

## 2023-06-02 ASSESSMENT — PAIN SCALES - GENERAL: PAINLEVEL: SEVERE PAIN (7)

## 2023-06-02 NOTE — PROGRESS NOTES
Assessment & Plan   Lymphedema  Worse, at risk for celluitis  - Compression Pump Order for DME - ONLY FOR DME    Insomnia, unspecified type  longstanding    Anxiety associated with depression  worse    Thrombocytopenia (H)  Uncertain etiol, likely marrow suppression due to chronic hep C,     Alcoholic cirrhosis of liver with ascites (H)  Patient trying to drink less, declines treatment    Hypertension, benign essential, goal below 140/90  At goal; amlodipine may contribute to edema. May continue clonidine, and decrease amlodipine  - cloNIDine (CATAPRES) 0.1 MG tablet; Take 1 tablet (0.1 mg) by mouth 2 times daily    Review of external notes as documented elsewhere in note  Prescription drug management  40 minutes spent by me on the date of the encounter doing chart review, history and exam, documentation and further activities per the note     Patient Instructions   Stop trazodone-Take one half tab melatonin 2 mg every night for sleep    Refill at lower dose from 100 mg to Sertraline 50 mg daily    Take blood pressure/pulse daily x 3 days, send via [employer] on Mon    Contact if fever or legs become infected.    Contact medical supply for leg compression pumps 1 hour twice daily; in the meantime, lie in bed 1.5 hrs am, 2 hrs pm        Ishaan West MD  [facility]    Shreya Finch is a 68 year old, presenting for the following health issues:  Follow Up        6/2/2023    11:58 AM   Additional Questions   Roomed by Amy Reynoso   Accompanied by  Carlos     History of Present Illness       Reason for visit:  Leg swelling  Symptom onset:  More than a month  Symptoms include:  Leg swelling, knee injury, red spots on feet  Symptom intensity:  Moderate  Symptom progression:  Staying the same  Had these symptoms before:  No  What makes it worse:  Sitting for more than an hour  What makes it better:  Suboxone, ibuprofen    She eats 2-3 servings of fruits and vegetables daily.She  "consumes 2 sweetened beverage(s) daily.She exercises with enough effort to increase her heart rate 9 or less minutes per day.  She exercises with enough effort to increase her heart rate 5 days per week. She is missing 2 dose(s) of medications per week.  She is not taking prescribed medications regularly due to remembering to take.     Insomnia  Onset/Duration: years   Description:   Therapies tried and outcome: trazodone 50 mg prn -  Somewhat effective      Rash  Onset/Duration: swollen legs redder  Description  Location: both   Exposure to similar rash: YES  Precipitating or alleviating factors: easy bruising too  Therapies tried and outcome: hydrocortisone cream -  not effective and Benadryl/diphenhydramine -  not effective    Hypertension Follow-up      Do you check your blood pressure regularly outside of the clinic? No     Are you following a low salt diet? No    Are your blood pressures ever more than 140 on the top number (systolic) OR more   than 90 on the bottom number (diastolic), for example 140/90? No    Alcoholic hepatitis, chronic hepatitis c, with ascities    -unable to stay abstinent, trying to drink less, considering attending an on-line womens sobriety group sundays.    Would like clonidine prescription modified  Would like to discuss redness and bruising on arms    Review of Systems   Constitutional, HEENT, cardiovascular, pulmonary, GI, , musculoskeletal, neuro, skin, endocrine and psych systems are negative, except as otherwise noted.      Objective    /64   Pulse 75   Temp 97.9  F (36.6  C) (Temporal)   Resp 12   Ht 1.629 m (5' 4.13\")   Wt 59.6 kg (131 lb 8 oz)   LMP  (LMP Unknown)   SpO2 97%   BMI 22.48 kg/m    Body mass index is 22.48 kg/m .  Physical Exam   GENERAL: no distress, frail, elderly, fatigued and looks chronically ill  RESP: lungs clear to auscultation - no rales, rhonchi or wheezes  CV: regular rate and rhythm, normal S1 S2, no S3 or S4, no murmur, click or rub, " no peripheral edema and peripheral pulses strong  ABDOMEN: soft, distended  MS: 2+ edema to knees, red, shiny, not tender  SKIN: ecchymoses - arms and hands  PSYCH: tearful

## 2023-06-02 NOTE — PATIENT INSTRUCTIONS
Stop trazodone-Take one half tab melatonin 2 mg every night for sleep    Refill at lower dose from 100 mg to Sertraline 50 mg daily    Take blood pressure/pulse daily x 3 days, send via [employer] on Mon    Contact if fever or legs become infected.    Contact medical supply for leg compression pumps 1 hour twice daily; in the meantime, lie in bed 1.5 hrs am, 2 hrs pm

## 2023-06-04 PROBLEM — K70.31 ALCOHOLIC CIRRHOSIS OF LIVER WITH ASCITES (H): Status: ACTIVE | Noted: 2023-06-04

## 2023-06-04 PROBLEM — D69.9 BLEEDS EASILY (H): Status: ACTIVE | Noted: 2023-06-04

## 2023-06-04 PROBLEM — G47.00 INSOMNIA, UNSPECIFIED TYPE: Status: ACTIVE | Noted: 2023-06-04

## 2023-06-04 PROBLEM — I89.0 LYMPHEDEMA: Status: ACTIVE | Noted: 2023-06-04

## 2023-06-04 PROBLEM — D69.6 THROMBOCYTOPENIA (H): Status: ACTIVE | Noted: 2023-06-04

## 2023-06-18 DIAGNOSIS — Z79.899 ENCOUNTER FOR MONITORING SUBOXONE MAINTENANCE THERAPY: ICD-10-CM

## 2023-06-18 DIAGNOSIS — Z51.81 ENCOUNTER FOR MONITORING SUBOXONE MAINTENANCE THERAPY: ICD-10-CM

## 2023-06-19 RX ORDER — BUPRENORPHINE HYDROCHLORIDE AND NALOXONE HYDROCHLORIDE DIHYDRATE 2; .5 MG/1; MG/1
TABLET SUBLINGUAL
Qty: 30 TABLET | Refills: 0 | Status: SHIPPED | OUTPATIENT
Start: 2023-06-19 | End: 2023-07-20

## 2023-08-08 NOTE — TELEPHONE ENCOUNTER
Addended by: SOTERO JUSTIN on: 8/8/2023 03:42 PM     Modules accepted: Orders     Please ask patient to schedule appointment to see me in 3-4 weeks for suboxone followup. Order signed, please notify, thanks Ishaan

## 2023-08-18 DIAGNOSIS — Z79.899 ENCOUNTER FOR MONITORING SUBOXONE MAINTENANCE THERAPY: ICD-10-CM

## 2023-08-18 DIAGNOSIS — Z51.81 ENCOUNTER FOR MONITORING SUBOXONE MAINTENANCE THERAPY: ICD-10-CM

## 2023-08-18 RX ORDER — BUPRENORPHINE HYDROCHLORIDE AND NALOXONE HYDROCHLORIDE DIHYDRATE 2; .5 MG/1; MG/1
TABLET SUBLINGUAL
Qty: 30 TABLET | Refills: 0 | Status: SHIPPED | OUTPATIENT
Start: 2023-08-18 | End: 2023-09-18

## 2023-09-17 DIAGNOSIS — Z79.899 ENCOUNTER FOR MONITORING SUBOXONE MAINTENANCE THERAPY: ICD-10-CM

## 2023-09-17 DIAGNOSIS — Z51.81 ENCOUNTER FOR MONITORING SUBOXONE MAINTENANCE THERAPY: ICD-10-CM

## 2023-09-18 DIAGNOSIS — I10 HYPERTENSION, BENIGN ESSENTIAL, GOAL BELOW 140/90: ICD-10-CM

## 2023-09-18 RX ORDER — AMLODIPINE BESYLATE 5 MG/1
TABLET ORAL
Qty: 90 TABLET | Refills: 1 | Status: SHIPPED | OUTPATIENT
Start: 2023-09-18 | End: 2024-07-01

## 2023-09-18 RX ORDER — BUPRENORPHINE HYDROCHLORIDE AND NALOXONE HYDROCHLORIDE DIHYDRATE 2; .5 MG/1; MG/1
TABLET SUBLINGUAL
Qty: 30 TABLET | Refills: 0 | Status: SHIPPED | OUTPATIENT
Start: 2023-09-18 | End: 2023-09-26

## 2023-09-18 NOTE — TELEPHONE ENCOUNTER
"Requested Prescriptions   Pending Prescriptions Disp Refills    amLODIPine (NORVASC) 5 MG tablet [Pharmacy Med Name: AMLODIPINE BESYLATE 5MG TABLETS] 90 tablet 0     Sig: TAKE 1 TABLET(5 MG) BY MOUTH DAILY       Calcium Channel Blockers Protocol  Failed - 9/18/2023 12:06 PM        Failed - Normal serum creatinine on file in past 12 months     Recent Labs   Lab Test 04/17/23  1415   CR 1.32*       Ok to refill medication if creatinine is low          Passed - Blood pressure under 140/90 in past 12 months     BP Readings from Last 3 Encounters:   06/02/23 138/64   04/17/23 (!) 142/68   07/25/22 (!) 149/74                 Passed - Recent (12 mo) or future (30 days) visit within the authorizing provider's specialty     Patient has had an office visit with the authorizing provider or a provider within the authorizing providers department within the previous 12 mos or has a future within next 30 days. See \"Patient Info\" tab in inbasket, or \"Choose Columns\" in Meds & Orders section of the refill encounter.              Passed - Medication is active on med list        Passed - Patient is age 18 or older        Passed - No active pregnancy on record        Passed - No positive pregnancy test in past 12 months          Routing refill request to provider for review/approval because medication did not pass protocol.    Pt was last seen on 06/02/23    Casandra Miller RN  [facility]   "

## 2023-09-26 DIAGNOSIS — Z51.81 ENCOUNTER FOR MONITORING SUBOXONE MAINTENANCE THERAPY: ICD-10-CM

## 2023-09-26 DIAGNOSIS — Z79.899 ENCOUNTER FOR MONITORING SUBOXONE MAINTENANCE THERAPY: ICD-10-CM

## 2023-09-26 NOTE — TELEPHONE ENCOUNTER
Patient calling to see why only received 30 tablets of Suboxone this refill when previously she - only lasts 15 days since takes twice daily   Previous rx were for 30 tablets but pt states she always received 60     States she is out of town next week and will be run out of medication     Ok to send over another 30 tablets?   Last OV 6/2/23    Raysa DC RN  [facility]

## 2023-09-27 RX ORDER — BUPRENORPHINE HYDROCHLORIDE AND NALOXONE HYDROCHLORIDE DIHYDRATE 2; .5 MG/1; MG/1
1 TABLET SUBLINGUAL DAILY
Qty: 30 TABLET | Refills: 0 | Status: SHIPPED | OUTPATIENT
Start: 2023-09-27 | End: 2023-10-30

## 2023-09-27 NOTE — TELEPHONE ENCOUNTER
Called pt and LMOM to callback clinic to relay order was signed and sent to pharmacy for another 15 day supply.    Andrés Thomas RN   [facility]

## 2023-10-02 ENCOUNTER — TELEPHONE (OUTPATIENT)
Dept: FAMILY MEDICINE | Facility: CLINIC | Age: 68
End: 2023-10-02
Payer: MEDICARE

## 2023-10-02 NOTE — TELEPHONE ENCOUNTER
Pharmacy had issues.   A month supply of suboxide.   Takes 2 a day 2 bottles of 30 tablets.   Is not sure what happened. But has not had an issue.   Thanks.   Cedrick Brewster RN  [facility]

## 2023-10-30 DIAGNOSIS — Z51.81 ENCOUNTER FOR MONITORING SUBOXONE MAINTENANCE THERAPY: ICD-10-CM

## 2023-10-30 DIAGNOSIS — Z79.899 ENCOUNTER FOR MONITORING SUBOXONE MAINTENANCE THERAPY: ICD-10-CM

## 2023-10-30 RX ORDER — BUPRENORPHINE HYDROCHLORIDE AND NALOXONE HYDROCHLORIDE DIHYDRATE 2; .5 MG/1; MG/1
1 TABLET SUBLINGUAL DAILY
Qty: 30 TABLET | Refills: 0 | Status: SHIPPED | OUTPATIENT
Start: 2023-10-30 | End: 2023-12-06

## 2023-12-06 DIAGNOSIS — Z79.899 ENCOUNTER FOR MONITORING SUBOXONE MAINTENANCE THERAPY: ICD-10-CM

## 2023-12-06 DIAGNOSIS — Z51.81 ENCOUNTER FOR MONITORING SUBOXONE MAINTENANCE THERAPY: ICD-10-CM

## 2023-12-06 RX ORDER — BUPRENORPHINE HYDROCHLORIDE AND NALOXONE HYDROCHLORIDE DIHYDRATE 2; .5 MG/1; MG/1
1 TABLET SUBLINGUAL 2 TIMES DAILY
Qty: 60 TABLET | Refills: 0 | Status: SHIPPED | OUTPATIENT
Start: 2023-12-06 | End: 2024-01-21

## 2023-12-06 NOTE — TELEPHONE ENCOUNTER
Patient calling to see why she is receiving less tablets of her Buprenorphine-naloxone (SUBOXONE) 2-0.5 MG SUBL sublingual tablet      Pt states she is taking 1 tablet twice daily, as previously prescribed.     In chart review it looks like Rx changed with 9/18/23 prescription from twice daily to one time daily and patient is subsequently running out of tablets in 2 weeks rather than a month.        Should she be taking once daily or twice daily?     Please advise   Thank you  Raysa DC RN  [facility]

## 2023-12-06 NOTE — TELEPHONE ENCOUNTER
Limited refill as overdue for 6 month followup appointment- please schedule   Order signed, please notify, thanks Ishaan

## 2023-12-22 ENCOUNTER — TRANSFERRED RECORDS (OUTPATIENT)
Dept: HEALTH INFORMATION MANAGEMENT | Facility: CLINIC | Age: 68
End: 2023-12-22
Payer: MEDICARE

## 2024-01-06 ENCOUNTER — MEDICAL CORRESPONDENCE (OUTPATIENT)
Dept: HEALTH INFORMATION MANAGEMENT | Facility: CLINIC | Age: 69
End: 2024-01-06

## 2024-01-09 ENCOUNTER — TELEPHONE (OUTPATIENT)
Dept: GASTROENTEROLOGY | Facility: CLINIC | Age: 69
End: 2024-01-09
Payer: MEDICARE

## 2024-01-09 ENCOUNTER — TELEPHONE (OUTPATIENT)
Dept: FAMILY MEDICINE | Facility: CLINIC | Age: 69
End: 2024-01-09
Payer: MEDICARE

## 2024-01-09 DIAGNOSIS — K70.11 ASCITES DUE TO ALCOHOLIC HEPATITIS (H): Primary | ICD-10-CM

## 2024-01-09 RX ORDER — LIDOCAINE HYDROCHLORIDE 10 MG/ML
20 INJECTION, SOLUTION EPIDURAL; INFILTRATION; INTRACAUDAL; PERINEURAL ONCE
OUTPATIENT
Start: 2024-01-09 | End: 2024-01-09

## 2024-01-09 RX ORDER — ALBUMIN (HUMAN) 12.5 G/50ML
12.5 SOLUTION INTRAVENOUS
OUTPATIENT
Start: 2024-01-09

## 2024-01-09 RX ORDER — ALBUTEROL SULFATE 90 UG/1
1-2 AEROSOL, METERED RESPIRATORY (INHALATION)
Start: 2024-01-09

## 2024-01-09 RX ORDER — METHYLPREDNISOLONE SODIUM SUCCINATE 125 MG/2ML
125 INJECTION, POWDER, LYOPHILIZED, FOR SOLUTION INTRAMUSCULAR; INTRAVENOUS
Start: 2024-01-09

## 2024-01-09 RX ORDER — DIPHENHYDRAMINE HYDROCHLORIDE 50 MG/ML
50 INJECTION INTRAMUSCULAR; INTRAVENOUS
Start: 2024-01-09

## 2024-01-09 RX ORDER — HEPARIN SODIUM (PORCINE) LOCK FLUSH IV SOLN 100 UNIT/ML 100 UNIT/ML
5 SOLUTION INTRAVENOUS
OUTPATIENT
Start: 2024-01-09

## 2024-01-09 RX ORDER — ALBUTEROL SULFATE 0.83 MG/ML
2.5 SOLUTION RESPIRATORY (INHALATION)
OUTPATIENT
Start: 2024-01-09

## 2024-01-09 RX ORDER — HEPARIN SODIUM,PORCINE 10 UNIT/ML
5-20 VIAL (ML) INTRAVENOUS DAILY PRN
OUTPATIENT
Start: 2024-01-09

## 2024-01-09 RX ORDER — EPINEPHRINE 1 MG/ML
0.3 INJECTION, SOLUTION, CONCENTRATE INTRAVENOUS EVERY 5 MIN PRN
OUTPATIENT
Start: 2024-01-09

## 2024-01-09 NOTE — TELEPHONE ENCOUNTER
[facility]; pt can be see by Nuria Martinez PA-C for post hospital follow up, [place] Thursdays; last seen 2022- KB 1.9.24//

## 2024-01-09 NOTE — TELEPHONE ENCOUNTER
Triage,   Mily from [facility] home care called to request the following verbal orders from KK:    OT 2x a week for 2 weeks, 1x a week for 1 week for ADLs and energy conservation.     Best call back #: [phone].   Confidential vm.   Ok for detailed.    Thanks!  Jenna BHATT

## 2024-01-10 NOTE — TELEPHONE ENCOUNTER
Called [employer] back and relayed verbal approval to Viviane.    Andrés Thomas RN   [facility]

## 2024-01-11 ENCOUNTER — LAB (OUTPATIENT)
Dept: LAB | Facility: CLINIC | Age: 69
End: 2024-01-11
Payer: MEDICARE

## 2024-01-11 ENCOUNTER — TELEPHONE (OUTPATIENT)
Dept: GASTROENTEROLOGY | Facility: CLINIC | Age: 69
End: 2024-01-11
Payer: MEDICARE

## 2024-01-11 DIAGNOSIS — N17.9 ACUTE KIDNEY FAILURE, UNSPECIFIED (H): ICD-10-CM

## 2024-01-11 DIAGNOSIS — E11.9 DIABETES MELLITUS, TYPE 2 (H): Primary | ICD-10-CM

## 2024-01-11 LAB
ANION GAP SERPL CALCULATED.3IONS-SCNC: 8 MMOL/L (ref 7–15)
BUN SERPL-MCNC: 34.9 MG/DL (ref 8–23)
CALCIUM SERPL-MCNC: 8.8 MG/DL (ref 8.8–10.2)
CHLORIDE SERPL-SCNC: 109 MMOL/L (ref 98–107)
CREAT SERPL-MCNC: 1.64 MG/DL (ref 0.51–0.95)
DEPRECATED HCO3 PLAS-SCNC: 20 MMOL/L (ref 22–29)
EGFRCR SERPLBLD CKD-EPI 2021: 34 ML/MIN/1.73M2
GLUCOSE SERPL-MCNC: 138 MG/DL (ref 70–99)
HBA1C MFR BLD: 5.7 % (ref 0–5.6)
POTASSIUM SERPL-SCNC: 4.9 MMOL/L (ref 3.4–5.3)
SODIUM SERPL-SCNC: 137 MMOL/L (ref 135–145)

## 2024-01-11 PROCEDURE — 80048 BASIC METABOLIC PNL TOTAL CA: CPT

## 2024-01-11 PROCEDURE — 83036 HEMOGLOBIN GLYCOSYLATED A1C: CPT

## 2024-01-11 PROCEDURE — 36415 COLL VENOUS BLD VENIPUNCTURE: CPT

## 2024-01-11 NOTE — TELEPHONE ENCOUNTER
LVM pt can be see by Nuria Martinez PA-C for post hospital follow up, [place] Thursdays; last seen Dr. Villalta 2022; second attempt- KB 1.11.24//    S:  Andre Saenz is a 2 y.o. female who presents for nasal congestion and wet cough.   On Friday night last week, she and her 6mo brother (Also seen here today) had viral gastroenteritis. She had vomiting, diarrhea, lasted a little less than 2 days and then resolved. On the day she stopped having diarrhea on Sunday, she started to develop rhinorrhea and a cough. The cough has become progressively more wet. She has had less energy, though is still playing. Not complaining of ear pain or belly pain or sore throat. She is eating and drinking.     Dad just had a cold (now resolved) and mom has one now.     O:  Pulse 127, temperature 98.2  F (36.8  C), temperature source Axillary, resp. rate 18, weight 31 lb 4.8 oz (14.2 kg), SpO2 100 %.   Gen: Well appearing, though flushed. She is no distress.  HEENT: Head - normocephalic, atraumatic   Eyes - normal lids and conjuntivae, EOMs intact   Ears: TMs normal bilaterally, normal canals.  Nose - no deformity, without masses, moderate clear congestion   Oropharynx - Oral mucosa and pharnyx normal except for slight cobblestone appearance, moist mucous membranes   Resp: clear to auscultation bilaterally, no wheeze or crackles.   Card: RRR, no murmur, normal S1/S2. No pedal edema.        A/P  Andre was seen today for cough and nasal congestion.    Diagnoses and all orders for this visit:    Viral URI with cough: recent viral gastroenteritis, now appears to have a viral URI. No focal findings, normal throat, ears, normal sounding lungs, no red flags (Fever, shortness of breath, lethargy, etc). Sibling and parents with viral illnesses as well. Will treat with supportive therapies including fluids, rest, monitoring for changing symptoms. Parents understood and were agreeable to this plan.       Sonia Comer

## 2024-01-12 NOTE — RESULT ENCOUNTER NOTE
Haroldo Nathan, your recent results are back and are all normal. Please contact if any questions.   Ishaan ELIAS

## 2024-01-17 ENCOUNTER — MEDICAL CORRESPONDENCE (OUTPATIENT)
Dept: HEALTH INFORMATION MANAGEMENT | Facility: CLINIC | Age: 69
End: 2024-01-17
Payer: MEDICARE

## 2024-01-17 ENCOUNTER — TRANSFERRED RECORDS (OUTPATIENT)
Dept: HEALTH INFORMATION MANAGEMENT | Facility: CLINIC | Age: 69
End: 2024-01-17
Payer: MEDICARE

## 2024-01-18 ENCOUNTER — TELEPHONE (OUTPATIENT)
Dept: FAMILY MEDICINE | Facility: CLINIC | Age: 69
End: 2024-01-18
Payer: MEDICARE

## 2024-01-18 NOTE — TELEPHONE ENCOUNTER
Forms/Letter Request    Type of form/letter: Home Health Certification      Do we have the form/letter: Yes:     Who is the form from? Home care    Where did/will the form come from? form was faxed in    When is form/letter needed by:     How would you like the form/letter returned: [phone]    Patient Notified form requests are processed in 5-7 business days:No    Could we send this information to you in [facility] [place] or would you prefer to receive a phone call?:   No preference   Okay to leave a detailed message?: Yes at Home number on file [phone] (home)

## 2024-01-19 ENCOUNTER — OFFICE VISIT (OUTPATIENT)
Dept: FAMILY MEDICINE | Facility: CLINIC | Age: 69
End: 2024-01-19
Payer: MEDICARE

## 2024-01-19 VITALS
HEART RATE: 71 BPM | BODY MASS INDEX: 22.59 KG/M2 | SYSTOLIC BLOOD PRESSURE: 150 MMHG | TEMPERATURE: 97.4 F | HEIGHT: 63 IN | RESPIRATION RATE: 17 BRPM | OXYGEN SATURATION: 99 % | DIASTOLIC BLOOD PRESSURE: 74 MMHG | WEIGHT: 127.5 LBS

## 2024-01-19 DIAGNOSIS — F33.1 MODERATE EPISODE OF RECURRENT MAJOR DEPRESSIVE DISORDER (H): ICD-10-CM

## 2024-01-19 DIAGNOSIS — E46 PROTEIN DEFICIENCY (H): ICD-10-CM

## 2024-01-19 DIAGNOSIS — I10 ELEVATED BLOOD PRESSURE READING WITH DIAGNOSIS OF HYPERTENSION: ICD-10-CM

## 2024-01-19 DIAGNOSIS — Z51.81 ENCOUNTER FOR MONITORING SUBOXONE MAINTENANCE THERAPY: ICD-10-CM

## 2024-01-19 DIAGNOSIS — N18.31 CKD STAGE G3A/A2, GFR 45-59 AND ALBUMIN CREATININE RATIO 30-299 MG/G (H): ICD-10-CM

## 2024-01-19 DIAGNOSIS — Z53.9 DIAGNOSIS NOT YET DEFINED: Primary | ICD-10-CM

## 2024-01-19 DIAGNOSIS — E11.9 TYPE 2 DIABETES MELLITUS WITHOUT COMPLICATION, WITHOUT LONG-TERM CURRENT USE OF INSULIN (H): ICD-10-CM

## 2024-01-19 DIAGNOSIS — K70.11 ASCITES DUE TO ALCOHOLIC HEPATITIS (H): ICD-10-CM

## 2024-01-19 DIAGNOSIS — Z79.899 ENCOUNTER FOR MONITORING SUBOXONE MAINTENANCE THERAPY: ICD-10-CM

## 2024-01-19 LAB
ERYTHROCYTE [DISTWIDTH] IN BLOOD BY AUTOMATED COUNT: 14 % (ref 10–15)
HCT VFR BLD AUTO: 28.1 % (ref 35–47)
HGB BLD-MCNC: 8.7 G/DL (ref 11.7–15.7)
INR PPP: 1.18 (ref 0.85–1.15)
MCH RBC QN AUTO: 30.1 PG (ref 26.5–33)
MCHC RBC AUTO-ENTMCNC: 31 G/DL (ref 31.5–36.5)
MCV RBC AUTO: 97 FL (ref 78–100)
PLATELET # BLD AUTO: 131 10E3/UL (ref 150–450)
RBC # BLD AUTO: 2.89 10E6/UL (ref 3.8–5.2)
WBC # BLD AUTO: 5.1 10E3/UL (ref 4–11)

## 2024-01-19 PROCEDURE — 85027 COMPLETE CBC AUTOMATED: CPT | Performed by: FAMILY MEDICINE

## 2024-01-19 PROCEDURE — 90662 IIV NO PRSV INCREASED AG IM: CPT | Performed by: FAMILY MEDICINE

## 2024-01-19 PROCEDURE — 36415 COLL VENOUS BLD VENIPUNCTURE: CPT | Performed by: FAMILY MEDICINE

## 2024-01-19 PROCEDURE — G0180 MD CERTIFICATION HHA PATIENT: HCPCS | Performed by: FAMILY MEDICINE

## 2024-01-19 PROCEDURE — 99214 OFFICE O/P EST MOD 30 MIN: CPT | Mod: 25 | Performed by: FAMILY MEDICINE

## 2024-01-19 PROCEDURE — 90480 ADMN SARSCOV2 VAC 1/ONLY CMP: CPT | Performed by: FAMILY MEDICINE

## 2024-01-19 PROCEDURE — G0008 ADMIN INFLUENZA VIRUS VAC: HCPCS | Performed by: FAMILY MEDICINE

## 2024-01-19 PROCEDURE — 85610 PROTHROMBIN TIME: CPT | Performed by: FAMILY MEDICINE

## 2024-01-19 PROCEDURE — 91320 SARSCV2 VAC 30MCG TRS-SUC IM: CPT | Performed by: FAMILY MEDICINE

## 2024-01-19 PROCEDURE — 80053 COMPREHEN METABOLIC PANEL: CPT | Performed by: FAMILY MEDICINE

## 2024-01-19 PROCEDURE — 82248 BILIRUBIN DIRECT: CPT | Performed by: FAMILY MEDICINE

## 2024-01-19 ASSESSMENT — PAIN SCALES - GENERAL: PAINLEVEL: NO PAIN (0)

## 2024-01-19 NOTE — PROGRESS NOTES
Assessment & Plan     Abstinence from alcohol  Sober for a month    Ascites due to alcoholic hepatitis (H28)  Re accumulating slowly   - CBC with platelets; Future  - Hepatic panel (Albumin, ALT, AST, Bili, Alk Phos, TP); Future  - INR; Future  - CBC with platelets  - Hepatic panel (Albumin, ALT, AST, Bili, Alk Phos, TP)  - INR    CKD stage G3a/A2, GFR 45-59 and albumin creatinine ratio  mg/g (H)  RUBENS improving  - Basic metabolic panel  (Ca, Cl, CO2, Creat, Gluc, K, Na, BUN); Future  - Basic metabolic panel  (Ca, Cl, CO2, Creat, Gluc, K, Na, BUN)    Type 2 diabetes mellitus without complication, without long-term current use of insulin (H)  Normal glucoses, likely due to wt loss    Elevated blood pressure reading with diagnosis of hypertension  Not at goal in office    Review of external notes as documented elsewhere in note  Ordering of each unique test  Prescription drug management  30 minutes spent by me on the date of the encounter doing chart review, history and exam, documentation and further activities per the note    MED REC REQUIRED   discharge medications reconciled, continue medications without change  Patient Instructions   Try to eat balanced meals 3 x daily    MyChart blood pressures/pulses in morning before any medication, then at least 2 hrs or more later x 3 days      Subjective   Josette is a 68 year old, presenting for the following health issues:  Hospital F/U and Imm/Inj (Covid)        1/19/2024     1:04 PM   Additional Questions   Roomed by Jany Christy   Accompanied by  - Carlos     Imm/Inj         Hospital Follow-up Visit:    Hospital/Nursing Home/IP Rehab Facility: [facility]   Date of Admission: 12/22/2023   Date of Discharge: 1/4/2024   Reason(s) for Admission: Anasarca    Was your hospitalization related to COVID-19? No   Problems taking medications regularly:  None  Medication changes since discharge: Diuretic water pill  Problems adhering to non-medication  therapy:  None    Summary of hospitalization:  CareEverywhere information obtained and reviewed  Diagnostic Tests/Treatments reviewed.  Follow up needed: yes  Other Healthcare Providers Involved in Patient s Care:         nephrology  Update since discharge: improved.     Plan of care communicated with patient and family    Alcoholism-      Sober since 12/22/23 when hopitalized  Declines attending therapy or 12 step group (used to attend [facility] for years)  Alcoholic liver disease with ascites worse, with slower reaccummulation since 2nd paracentesis. Will likely have another paracentesis at her next outpatient appointment.    Diabetes Follow-up    How often are you checking your blood sugar? Not at all  What concerns do you have today about your diabetes? None   Do you have any of these symptoms? (Select all that apply)  Numbness in feet and Weight loss      BP Readings from Last 2 Encounters:   01/19/24 (!) 150/74   06/02/23 138/64     Hemoglobin A1C (%)   Date Value   01/11/2024 5.7 (H)   04/17/2023 5.4   06/05/2019 6.0 (H)   04/27/2018 5.9 (H)     LDL Cholesterol Calculated (mg/dL)   Date Value   04/17/2023 39   10/01/2021 31   11/22/2017 31   07/28/2016 41     Hypertension Follow-up    Do you check your blood pressure regularly outside of the clinic? No   Are you following a low salt diet? Yes  Are your blood pressures ever more than 140 on the top number (systolic) OR more   than 90 on the bottom number (diastolic), for example 140/90? Yes    Chronic Kidney Disease Follow-up    Do you take any over the counter pain medicine?: Yes  What over the counter medicine are you taking for your pain?:  acetaminophen    How often do you take this medicine?:  A few times a week  How many days per week do you miss taking your medication? 0      Review of Systems  Constitutional, HEENT, cardiovascular, pulmonary, gi and gu systems are negative, except as otherwise noted.      Objective    BP (!) 150/74 (BP Location:  "Left arm, Patient Position: Sitting, Cuff Size: Adult Small)   Pulse 71   Temp 97.4  F (36.3  C) (Temporal)   Resp 17   Ht 1.61 m (5' 3.39\")   Wt 57.8 kg (127 lb 8 oz)   LMP  (LMP Unknown)   SpO2 99%   BMI 22.31 kg/m    Body mass index is 22.31 kg/m .  Physical Exam   GENERAL: no distress, frail, elderly, fatigued, and appears older than stated age  NECK: no adenopathy, no asymmetry, masses, or scars  RESP: lungs clear to auscultation - no rales, rhonchi or wheezes  CV: regular rate and rhythm, normal S1 S2, no S3 or S4, no murmur, click or rub, no peripheral edema  ABDOMEN: soft, nontender, without hepatosplenomegaly or masses, bowel sounds normal, and small ascitic fluid wave  MS: no gross musculoskeletal defects noted, no edema  SKIN: sallow color  NEURO: weakness, generalized  PSYCH: affect flat, fatigued, and judgement and insight impaired        Signed Electronically by: Ishaan West MD    "

## 2024-01-19 NOTE — PATIENT INSTRUCTIONS
Try to eat balanced meals 3 x daily    MyChart blood pressures/pulses in morning before any medication, then at least 2 hrs or more later x 3 days

## 2024-01-20 LAB
ALBUMIN SERPL BCG-MCNC: 3.5 G/DL (ref 3.5–5.2)
ALP SERPL-CCNC: 155 U/L (ref 40–150)
ALT SERPL W P-5'-P-CCNC: 29 U/L (ref 0–50)
ANION GAP SERPL CALCULATED.3IONS-SCNC: 8 MMOL/L (ref 7–15)
AST SERPL W P-5'-P-CCNC: 49 U/L (ref 0–45)
BILIRUB DIRECT SERPL-MCNC: <0.2 MG/DL (ref 0–0.3)
BILIRUB SERPL-MCNC: 0.4 MG/DL
BUN SERPL-MCNC: 34.9 MG/DL (ref 8–23)
CALCIUM SERPL-MCNC: 9 MG/DL (ref 8.8–10.2)
CHLORIDE SERPL-SCNC: 112 MMOL/L (ref 98–107)
CREAT SERPL-MCNC: 1.7 MG/DL (ref 0.51–0.95)
DEPRECATED HCO3 PLAS-SCNC: 21 MMOL/L (ref 22–29)
EGFRCR SERPLBLD CKD-EPI 2021: 32 ML/MIN/1.73M2
GLUCOSE SERPL-MCNC: 100 MG/DL (ref 70–99)
POTASSIUM SERPL-SCNC: 5.5 MMOL/L (ref 3.4–5.3)
PROT SERPL-MCNC: 7.2 G/DL (ref 6.4–8.3)
SODIUM SERPL-SCNC: 141 MMOL/L (ref 135–145)

## 2024-01-21 PROBLEM — E46 PROTEIN DEFICIENCY (H): Status: ACTIVE | Noted: 2024-01-21

## 2024-01-21 PROBLEM — I10 ELEVATED BLOOD PRESSURE READING WITH DIAGNOSIS OF HYPERTENSION: Status: ACTIVE | Noted: 2024-01-21

## 2024-01-21 PROBLEM — F33.1 MODERATE EPISODE OF RECURRENT MAJOR DEPRESSIVE DISORDER (H): Status: ACTIVE | Noted: 2024-01-21

## 2024-01-21 RX ORDER — BUPRENORPHINE HYDROCHLORIDE AND NALOXONE HYDROCHLORIDE DIHYDRATE 2; .5 MG/1; MG/1
1 TABLET SUBLINGUAL 2 TIMES DAILY
Qty: 60 TABLET | Refills: 0 | Status: SHIPPED | OUTPATIENT
Start: 2024-01-21 | End: 2024-03-08

## 2024-01-21 NOTE — RESULT ENCOUNTER NOTE
Haroldo Nathan: blood chemstries indicate liver tests are mixed regarding-   liver irritation, with alkaline phosphatase up, AST down, INR measure of liver health/ability of blood to clot, is worse.   Protein, albumen results are up to normal - good news! Keep eating a modest amount of protein.  Chronic kidney disease is slightly worse with higher creat, lower GFR, and elevated potassium; drinking an extra glass or two every day helps.  Recommend that you followup with your nephrology/kidney specialists tomorrow.  -contact if questions.    Ishaan ELIAS

## 2024-01-26 ENCOUNTER — TELEPHONE (OUTPATIENT)
Dept: FAMILY MEDICINE | Facility: CLINIC | Age: 69
End: 2024-01-26
Payer: MEDICARE

## 2024-01-26 DIAGNOSIS — F43.0 ACUTE REACTION TO STRESS: Primary | ICD-10-CM

## 2024-01-26 RX ORDER — HYDROXYZINE PAMOATE 25 MG/1
CAPSULE ORAL
Qty: 30 CAPSULE | Refills: 0 | Status: SHIPPED | OUTPATIENT
Start: 2024-01-26 | End: 2024-04-02

## 2024-01-26 RX ORDER — LORAZEPAM 0.5 MG/1
0.5 TABLET ORAL DAILY PRN
Qty: 6 TABLET | Refills: 0 | Status: SHIPPED | OUTPATIENT
Start: 2024-01-26 | End: 2024-01-26 | Stop reason: ALTCHOICE

## 2024-01-26 NOTE — TELEPHONE ENCOUNTER
Pt mom was just put in hospice, and she is very stressed and overwhelmed. Would like to ask for something for  and to calm her in this process.   Please advise,   Cedrick Brewster RN  [facility]

## 2024-01-26 NOTE — TELEPHONE ENCOUNTER
Spoke with pt and . Very thankful in this stressful time.   Thanks.   Cedrick Brewster RN  [facility]

## 2024-01-26 NOTE — TELEPHONE ENCOUNTER
Sorry to hear about her mom; recommend hydroxyzine 25 mg 1-2 every 4 hrs as needed.  May make drowsy- be careful walking, do not drive.  Order signed, please notify, thanks Ishaan

## 2024-01-29 ENCOUNTER — TELEPHONE (OUTPATIENT)
Dept: FAMILY MEDICINE | Facility: CLINIC | Age: 69
End: 2024-01-29
Payer: MEDICARE

## 2024-01-29 NOTE — TELEPHONE ENCOUNTER
Medication Question or Refill    Contacts         Type Contact Phone/Fax    01/29/2024 12:44 PM CST Phone (Incoming) Suzanne Josette L (Self) [phone] (M)            What medication are you calling about (include dose and sig)?: hydrOXYzine jackie (VISTARIL) 25 MG capsule     Preferred Pharmacy:   [id number] - [facility], [place] - [address]  [address] [address]  Phone: [phone] Fax: [phone]      Controlled Substance Agreement on file:   CSA -- Patient Level:     [Media Unavailable] Controlled Substance Agreement - Opioid - Scan on 12/11/2021  6:15 PM       Who prescribed the medication?: Ishaan West    Do you need a refill? No    When did you use the medication last? N/A    Patient offered an appointment? No    Do you have any questions or concerns?  Yes: Ins denied medication and pharmacy said they would send something over for Dr. West to fill out. Pt wants to get things figured out as they need something for anxiety      Could we send this information to you in [facility] or would you prefer to receive a phone call?:   Patient would prefer a phone call   Okay to leave a detailed message?: Yes at Cell number on file:    Telephone Information:   Mobile [phone]

## 2024-01-30 ENCOUNTER — TELEPHONE (OUTPATIENT)
Dept: FAMILY MEDICINE | Facility: CLINIC | Age: 69
End: 2024-01-30
Payer: MEDICARE

## 2024-01-30 NOTE — TELEPHONE ENCOUNTER
[facility] calling for verbal orders to discharge pt from skilled nursing care due to loss in family and pt not wanting to be seen right now.    Callback number- [phone] and ok to [facility].    Thanks!  Andrés Thomas RN   [facility]

## 2024-01-30 NOTE — TELEPHONE ENCOUNTER
Retail Pharmacy Prior Authorization Team   Phone: [phone]    PA Initiation    Medication: HYDROXYZINE PAMOATE 25 MG PO CAPS  Insurance Company: Other (see comments)Comment:  Prime [phone]  Pharmacy Filling the Rx: [id number] - [facility] [address] [facility] AT [address] [facility]  Filling Pharmacy Phone: [phone]  Filling Pharmacy Fax:    Start Date: 1/30/2024

## 2024-01-31 NOTE — TELEPHONE ENCOUNTER
Note: Due to high request volumes our turn-around is time taking longer than normal . We are working diligently to submit all requests in a timely manner and by the order they are received.  If you have questions - please send a note/message in the active PA encounter and send back to the RPPA (Retail Pharmacy Prior Authorization) team [[id number]].    Thank you!     Prior Authorization Approval    Authorization Effective Date: 1/1/2024  Authorization Expiration Date: 1/30/2025  Medication: Hydroxyzine-APPROVED  Reference #:     Insurance Company: Other (see comments)Comment:  Prime [phone]  Which Pharmacy is filling the prescription (Not needed for infusion/clinic administered): [id number] - [facility] [address] AT [address] [place] [address]  Pharmacy Notified: Yes  Patient Notified: Instructed pharmacy to notify patient when script is ready to /ship.

## 2024-02-01 ENCOUNTER — TELEPHONE (OUTPATIENT)
Dept: FAMILY MEDICINE | Facility: CLINIC | Age: 69
End: 2024-02-01
Payer: MEDICARE

## 2024-02-01 NOTE — TELEPHONE ENCOUNTER
[facility] calling for verbal orders.    OT and PT on hold until Feb 12 th as pt had a death in her family.     Casandra Miller RN  [facility]

## 2024-02-02 ENCOUNTER — MEDICAL CORRESPONDENCE (OUTPATIENT)
Dept: FAMILY MEDICINE | Facility: CLINIC | Age: 69
End: 2024-02-02
Payer: MEDICARE

## 2024-02-02 NOTE — TELEPHONE ENCOUNTER
Called home care and relayed message. Thanks    Casandra Miller RN  [facility]

## 2024-02-05 ENCOUNTER — MEDICAL CORRESPONDENCE (OUTPATIENT)
Dept: HEALTH INFORMATION MANAGEMENT | Facility: CLINIC | Age: 69
End: 2024-02-05
Payer: MEDICARE

## 2024-02-06 ENCOUNTER — TELEPHONE (OUTPATIENT)
Dept: FAMILY MEDICINE | Facility: CLINIC | Age: 69
End: 2024-02-06
Payer: MEDICARE

## 2024-02-06 NOTE — TELEPHONE ENCOUNTER
Prior Authorization Request    Medication: hydrOXYzine jackie (VISTARIL) 25 MG capsule     Pharmacy:    [facility] #[id number]       Covermymeds?  N

## 2024-02-12 ENCOUNTER — LAB (OUTPATIENT)
Dept: LAB | Facility: CLINIC | Age: 69
End: 2024-02-12
Payer: MEDICARE

## 2024-02-12 DIAGNOSIS — D83.1: ICD-10-CM

## 2024-02-12 DIAGNOSIS — N18.32 CHRONIC KIDNEY DISEASE (CKD) STAGE G3B/A1, MODERATELY DECREASED GLOMERULAR FILTRATION RATE (GFR) BETWEEN 30-44 ML/MIN/1.73 SQUARE METER AND ALBUMINURIA CREATININE RATIO LESS THAN 30 MG/G (H): ICD-10-CM

## 2024-02-12 DIAGNOSIS — N17.9 ACUTE KIDNEY FAILURE, UNSPECIFIED (H): Primary | ICD-10-CM

## 2024-02-12 LAB
ALBUMIN SERPL BCG-MCNC: 3.6 G/DL (ref 3.5–5.2)
ANION GAP SERPL CALCULATED.3IONS-SCNC: 9 MMOL/L (ref 7–15)
BUN SERPL-MCNC: 38.5 MG/DL (ref 8–23)
CALCIUM SERPL-MCNC: 9.2 MG/DL (ref 8.8–10.2)
CHLORIDE SERPL-SCNC: 110 MMOL/L (ref 98–107)
CREAT SERPL-MCNC: 1.63 MG/DL (ref 0.51–0.95)
DEPRECATED HCO3 PLAS-SCNC: 20 MMOL/L (ref 22–29)
EGFRCR SERPLBLD CKD-EPI 2021: 34 ML/MIN/1.73M2
ERYTHROCYTE [DISTWIDTH] IN BLOOD BY AUTOMATED COUNT: 14.6 % (ref 10–15)
FERRITIN SERPL-MCNC: 315 NG/ML (ref 11–328)
GLUCOSE SERPL-MCNC: 188 MG/DL (ref 70–99)
HCT VFR BLD AUTO: 28.3 % (ref 35–47)
HGB BLD-MCNC: 9.1 G/DL (ref 11.7–15.7)
IRON BINDING CAPACITY (ROCHE): 238 UG/DL (ref 240–430)
IRON SATN MFR SERPL: 22 % (ref 15–46)
IRON SERPL-MCNC: 52 UG/DL (ref 37–145)
MCH RBC QN AUTO: 29.4 PG (ref 26.5–33)
MCHC RBC AUTO-ENTMCNC: 32.2 G/DL (ref 31.5–36.5)
MCV RBC AUTO: 92 FL (ref 78–100)
PHOSPHATE SERPL-MCNC: 4.8 MG/DL (ref 2.5–4.5)
PLATELET # BLD AUTO: 121 10E3/UL (ref 150–450)
POTASSIUM SERPL-SCNC: 5.1 MMOL/L (ref 3.4–5.3)
RBC # BLD AUTO: 3.09 10E6/UL (ref 3.8–5.2)
SODIUM SERPL-SCNC: 139 MMOL/L (ref 135–145)
WBC # BLD AUTO: 4.8 10E3/UL (ref 4–11)

## 2024-02-12 PROCEDURE — 83550 IRON BINDING TEST: CPT

## 2024-02-12 PROCEDURE — 83540 ASSAY OF IRON: CPT

## 2024-02-12 PROCEDURE — 85027 COMPLETE CBC AUTOMATED: CPT

## 2024-02-12 PROCEDURE — 36415 COLL VENOUS BLD VENIPUNCTURE: CPT

## 2024-02-12 PROCEDURE — 80069 RENAL FUNCTION PANEL: CPT

## 2024-02-12 PROCEDURE — 82728 ASSAY OF FERRITIN: CPT

## 2024-02-15 ENCOUNTER — TELEPHONE (OUTPATIENT)
Dept: FAMILY MEDICINE | Facility: CLINIC | Age: 69
End: 2024-02-15
Payer: MEDICARE

## 2024-02-15 NOTE — TELEPHONE ENCOUNTER
[facility] PT calling for verbal orders:    PT 1x week for 3 weeks for endurance and balance.     Callback number- [phone] and ok to [facility].    Thanks!  Andrés Thomas RN   [facility]

## 2024-02-16 ENCOUNTER — MEDICAL CORRESPONDENCE (OUTPATIENT)
Dept: HEALTH INFORMATION MANAGEMENT | Facility: CLINIC | Age: 69
End: 2024-02-16
Payer: MEDICARE

## 2024-02-16 NOTE — TELEPHONE ENCOUNTER
Called [facility] back and LDVM relaying verbal approval.    Andrés Thomas RN   [facility]

## 2024-02-19 ENCOUNTER — TELEPHONE (OUTPATIENT)
Dept: BEHAVIORAL HEALTH | Facility: CLINIC | Age: 69
End: 2024-02-19
Payer: MEDICARE

## 2024-02-19 ENCOUNTER — MEDICAL CORRESPONDENCE (OUTPATIENT)
Dept: HEALTH INFORMATION MANAGEMENT | Facility: CLINIC | Age: 69
End: 2024-02-19
Payer: MEDICARE

## 2024-02-19 NOTE — TELEPHONE ENCOUNTER
"2/19/24: Pt is a(n) adult (18+ out of HS) Seeking as eval for Adult Mental Health DA for Programmatic Care - Program Preference? No.  Appointment scheduled by:  Patient.  (self-pay - complete Cost Estimate)  Caller name:  Josette and spouse called in.    Caller phone #: [phone]  Legal Guardianship Reviewed?  No  Honoring Choices Notified?  No  Brief reason for appt:  Pt's spouse shared that her MH provider recommended she attend an OP Tx program for MH concerns.      needed?  NO    Contact information verified/updated: Yes    [facility]    \"We have scheduled your evaluation. In the event that your insurance coverage comes back as out of network, you may receive a call to cancel your appointment and direct you to your insurance company for in-network coverage.\"    Disclaimer regarding insurance read to patient?  No; Insurance verified.    "

## 2024-02-26 ENCOUNTER — TELEPHONE (OUTPATIENT)
Dept: FAMILY MEDICINE | Facility: CLINIC | Age: 69
End: 2024-02-26
Payer: MEDICARE

## 2024-02-26 NOTE — TELEPHONE ENCOUNTER
Reason for Call:  Appointment Request    Patient requesting this type of appt: Chronic Diease Management/Medication/Follow-Up    Requested provider: Ishaan West    Reason patient unable to be scheduled: Not within requested timeframe    When does patient want to be seen/preferred time: 1-2 days    Comments: Patient was instructed by psychiatry to schedule an immediate medication check-up due to symptoms. Her current medications do not seem to be working. She would like to schedule this as an in-person visit with her primary care provider.    Could we send this information to you in [facility] or would you prefer to receive a phone call?:   Patient would prefer a phone call   Okay to leave a detailed message?: Yes at Home number on file [phone] (home)    Call taken on 2/26/2024 at 2:36 PM by Ebony Weiss

## 2024-03-07 DIAGNOSIS — Z79.899 ENCOUNTER FOR MONITORING SUBOXONE MAINTENANCE THERAPY: ICD-10-CM

## 2024-03-07 DIAGNOSIS — Z51.81 ENCOUNTER FOR MONITORING SUBOXONE MAINTENANCE THERAPY: ICD-10-CM

## 2024-03-08 ENCOUNTER — OFFICE VISIT (OUTPATIENT)
Dept: FAMILY MEDICINE | Facility: CLINIC | Age: 69
End: 2024-03-08
Payer: MEDICARE

## 2024-03-08 VITALS
BODY MASS INDEX: 19.81 KG/M2 | OXYGEN SATURATION: 98 % | HEART RATE: 79 BPM | RESPIRATION RATE: 20 BRPM | SYSTOLIC BLOOD PRESSURE: 182 MMHG | HEIGHT: 64 IN | WEIGHT: 116 LBS | DIASTOLIC BLOOD PRESSURE: 80 MMHG | TEMPERATURE: 99 F

## 2024-03-08 DIAGNOSIS — R53.81 PHYSICAL DECONDITIONING: ICD-10-CM

## 2024-03-08 DIAGNOSIS — K70.31 ALCOHOLIC CIRRHOSIS OF LIVER WITH ASCITES (H): ICD-10-CM

## 2024-03-08 DIAGNOSIS — N39.46 MIXED INCONTINENCE: Primary | ICD-10-CM

## 2024-03-08 DIAGNOSIS — I10 ELEVATED BLOOD PRESSURE READING WITH DIAGNOSIS OF HYPERTENSION: ICD-10-CM

## 2024-03-08 DIAGNOSIS — N18.31 CKD STAGE G3A/A2, GFR 45-59 AND ALBUMIN CREATININE RATIO 30-299 MG/G (H): ICD-10-CM

## 2024-03-08 DIAGNOSIS — F33.1 MODERATE EPISODE OF RECURRENT MAJOR DEPRESSIVE DISORDER (H): ICD-10-CM

## 2024-03-08 DIAGNOSIS — Z51.81 ENCOUNTER FOR MONITORING SUBOXONE MAINTENANCE THERAPY: ICD-10-CM

## 2024-03-08 DIAGNOSIS — Z79.899 ENCOUNTER FOR MONITORING SUBOXONE MAINTENANCE THERAPY: ICD-10-CM

## 2024-03-08 DIAGNOSIS — F10.21 ALCOHOLISM IN RECOVERY (H): ICD-10-CM

## 2024-03-08 DIAGNOSIS — R63.4 WEIGHT LOSS: ICD-10-CM

## 2024-03-08 LAB
ALBUMIN UR-MCNC: 100 MG/DL
APPEARANCE UR: CLEAR
BACTERIA #/AREA URNS HPF: ABNORMAL /HPF
BILIRUB UR QL STRIP: NEGATIVE
COLOR UR AUTO: YELLOW
GLUCOSE UR STRIP-MCNC: NEGATIVE MG/DL
HGB UR QL STRIP: ABNORMAL
KETONES UR STRIP-MCNC: NEGATIVE MG/DL
LEUKOCYTE ESTERASE UR QL STRIP: NEGATIVE
NITRATE UR QL: NEGATIVE
PH UR STRIP: 6 [PH] (ref 5–7)
RBC #/AREA URNS AUTO: ABNORMAL /HPF
SP GR UR STRIP: 1.02 (ref 1–1.03)
SQUAMOUS #/AREA URNS AUTO: ABNORMAL /LPF
UROBILINOGEN UR STRIP-ACNC: 0.2 E.U./DL
WBC #/AREA URNS AUTO: ABNORMAL /HPF

## 2024-03-08 PROCEDURE — 99215 OFFICE O/P EST HI 40 MIN: CPT | Performed by: FAMILY MEDICINE

## 2024-03-08 PROCEDURE — 81001 URINALYSIS AUTO W/SCOPE: CPT | Performed by: FAMILY MEDICINE

## 2024-03-08 RX ORDER — BUPRENORPHINE HYDROCHLORIDE AND NALOXONE HYDROCHLORIDE DIHYDRATE 2; .5 MG/1; MG/1
1 TABLET SUBLINGUAL 2 TIMES DAILY
Qty: 60 TABLET | Refills: 0 | Status: SHIPPED | OUTPATIENT
Start: 2024-03-08 | End: 2024-05-16

## 2024-03-08 RX ORDER — RESPIRATORY SYNCYTIAL VIRUS VACCINE 120MCG/0.5
0.5 KIT INTRAMUSCULAR ONCE
Qty: 1 EACH | Refills: 0 | Status: CANCELLED | OUTPATIENT
Start: 2024-03-08 | End: 2024-03-08

## 2024-03-08 ASSESSMENT — PATIENT HEALTH QUESTIONNAIRE - PHQ9
10. IF YOU CHECKED OFF ANY PROBLEMS, HOW DIFFICULT HAVE THESE PROBLEMS MADE IT FOR YOU TO DO YOUR WORK, TAKE CARE OF THINGS AT HOME, OR GET ALONG WITH OTHER PEOPLE: EXTREMELY DIFFICULT
SUM OF ALL RESPONSES TO PHQ QUESTIONS 1-9: 16
SUM OF ALL RESPONSES TO PHQ QUESTIONS 1-9: 16

## 2024-03-08 ASSESSMENT — PAIN SCALES - GENERAL: PAINLEVEL: NO PAIN (0)

## 2024-03-08 NOTE — RESULT ENCOUNTER NOTE
Haroldo Nathan, Your initial urine shows a small amt of blood, and some protein. Will await further urine, blood test results. Nice to see you!     Ishaan ELIAS

## 2024-03-08 NOTE — PATIENT INSTRUCTIONS
Congratulations 3 months sober!    MyChart blood pressures/pulses in morning before any medication, then at least 2 hrs or more later x 3 days     Contact in 1 month after increasing sertraline from 50 mg to 75 mg daily. MyChart effects in a month    Continue suboxone

## 2024-03-08 NOTE — PROGRESS NOTES
Assessment & Plan     Dysuria  Will check UA today for possible infection.  - UA with Microscopic reflex to Culture - lab collect; Future  - UA with Microscopic reflex to Culture - lab collect  - UA Microscopic with Reflex to Culture    Weight loss  Discussed importance of regular diet with protein. Encouraged continued Ensure daily    Physical deconditioning  Suspect due to liver and kidney disease. Encouraged ambulation as able.    Alcoholic cirrhosis of liver with ascites (H)  Noted ascites on exam today along with continued easy bruising. Will continue to monitor closely.    Alcoholism in recovery (H)  No drink for 2.5 months. Plan to meet with addiction medicine team on 3/18.     Moderate episode of recurrent major depressive disorder (H)  Denies any concerns for self harm or SI. Encouraged continuing to meet with therapist weekly. Will increase sertraline from 50 mg to 75 mg today given low lood/anhedonia and monitor effect with follow-up [employer] message in one month.    Elevated blood pressure reading with diagnosis of hypertension  Asymptomatic for chest pain, palpitations, vision changes or dizziness. Suspect elevated today due to not taking her medication. Reinforced importance of taking  her amlodipine and clonidine daily as prescribed. Will follow-up on home blood pressure check over the next 3 days via [employer].     CKD stage G3a/A2, GFR 45-59 and albumin creatinine ratio  mg/g (H)  Worsening creatinine and GFR over the last year. Reinforced importance of regular hydration and good nutrition. Will continue to monitor closely.     Encounter for monitoring Suboxone maintenance therapy  No current cravings/withdrawal symptoms. Continue suboxone without changes for hx of heroin addiction.     Review of external notes as documented elsewhere in note  Ordering of each unique test  Prescription drug management  40 minutes spent by me on the date of the encounter doing chart review, history and exam,  "documentation and further activities per the note    Patient Instructions   Congratulations 3 months sober!    MyChart blood pressures/pulses in morning before any medication, then at least 2 hrs or more later x 3 days     Contact in 1 month after increasing sertraline from 50 mg to 75 mg daily. MyChart effects in a month    Continue suboxone          Subjective   Josette is a 69 year old, presenting for the following health issues:  Medication Therapy Management      3/8/2024    12:05 PM   Additional Questions   Roomed by Cadence STOREY     History of Present Illness       Reason for visit:  Medication therapy management    She eats 0-1 servings of fruits and vegetables daily.She consumes 0 sweetened beverage(s) daily.She exercises with enough effort to increase her heart rate 9 or less minutes per day.  She exercises with enough effort to increase her heart rate 3 or less days per week. She is missing 2 dose(s) of medications per week.  She is not taking prescribed medications regularly due to remembering to take.    Patient presents with her partner for concerns about worsening depression. Started seeing therapist Amy Willingham weekly for past 3 weeks through [facility] recommend from home [facility]. Therapist voiced concerns that she may need medical treatment. Patient doesn't state she feels p=overlydepressed, but has had some fatigue and loss of motivation and her partner confirms she has \"lost her spark\". Mother passed away a month ago. Notes she has been sleeping often during the day. No noticeable change to her appetite (drinks Ensure daily).     Sertraline was previously decreased from 100 mg to 50 mg due to concern of interactions with THC gummies. Partner felt higher sertraline dose was helping though in the past.     Quit taking all her medication briefly at end of last year due to heavy alcohol use. Alcohol-last drink 12/20/23    SW appt through addiction medicine set for 3/18 to discuss intensive " "outpatient mental health intensive care.       HTN: Forgot to take her amlodipine and clonidine this morning. Reports she forgets to take it fairly often (partner tried to remind her). Typically forgets to take it 2-3 days per week.       Review of Systems  CONSTITUTIONAL: NEGATIVE for fever, chills. POSITIVE for weight loss  INTEGUMENTARY/SKIN: bruising to her extremities per her baseline  EYES: NEGATIVE for vision changes or irritation  RESP: NEGATIVE for significant cough or SOB  CV: NEGATIVE for chest pain, palpitations or peripheral edema  GI: NEGATIVE for nausea, abdominal pain, heartburn, or change in bowel habits  :dysuria  PSYCHIATRIC: anhedonia, depressed mood, HX anxiety, and HX depression      Objective    BP (!) 190/78 (BP Location: Left arm, Patient Position: Sitting, Cuff Size: Adult Small)   Pulse 79   Temp 99  F (37.2  C) (Temporal)   Resp 20   Ht 1.626 m (5' 4\")   Wt 52.6 kg (116 lb)   LMP  (LMP Unknown)   SpO2 98%   BMI 19.91 kg/m    Body mass index is 19.91 kg/m .  Physical Exam   GENERAL: alert and no distress  EYES: Eyes grossly normal to inspection, PERRL and conjunctivae and sclerae normal  RESP: lungs clear to auscultation - no rales, rhonchi or wheezes  CV: regular rate and rhythm, normal S1 S2, no S3 or S4, no murmur, click or rub, no peripheral edema  ABDOMEN: non-tender, visible vein noted over abdomen, moderately distended with ascites  SKIN: ecchymoses - arms, fingers, hands, and lower legs  NEURO: Normal strength and tone, mentation intact and speech normal  PSYCH: mentation appears normal and affect flat          Note by:Keyona Pan NP student  The information in this document, created by the nurse practioner student for me, accurately reflects the services I personally performed and the decisions made by me. I have reviewed and approved this document for accuracy prior to leaving the patient care area.    Signed Electronically by: Ishaan West MD    "

## 2024-03-12 DIAGNOSIS — I10 ELEVATED BLOOD PRESSURE READING WITH DIAGNOSIS OF HYPERTENSION: ICD-10-CM

## 2024-03-12 DIAGNOSIS — I10 ELEVATED BLOOD PRESSURE READING WITH DIAGNOSIS OF HYPERTENSION: Primary | ICD-10-CM

## 2024-03-12 RX ORDER — LISINOPRIL 5 MG/1
5 TABLET ORAL DAILY
Qty: 30 TABLET | Refills: 11 | Status: SHIPPED | OUTPATIENT
Start: 2024-03-12 | End: 2024-03-13

## 2024-03-13 RX ORDER — LISINOPRIL 5 MG/1
5 TABLET ORAL DAILY
Qty: 90 TABLET | Refills: 3 | Status: SHIPPED | OUTPATIENT
Start: 2024-03-13

## 2024-03-16 ENCOUNTER — HEALTH MAINTENANCE LETTER (OUTPATIENT)
Age: 69
End: 2024-03-16

## 2024-03-18 ENCOUNTER — PATIENT OUTREACH (OUTPATIENT)
Dept: CARE COORDINATION | Facility: CLINIC | Age: 69
End: 2024-03-18
Payer: MEDICARE

## 2024-04-01 ENCOUNTER — PATIENT OUTREACH (OUTPATIENT)
Dept: CARE COORDINATION | Facility: CLINIC | Age: 69
End: 2024-04-01
Payer: MEDICARE

## 2024-04-05 ENCOUNTER — LAB (OUTPATIENT)
Dept: LAB | Facility: CLINIC | Age: 69
End: 2024-04-05
Payer: MEDICARE

## 2024-04-05 DIAGNOSIS — K70.31 ALCOHOLIC CIRRHOSIS OF LIVER WITH ASCITES (H): ICD-10-CM

## 2024-04-05 DIAGNOSIS — R53.82 CHRONIC FATIGUE, UNSPECIFIED: ICD-10-CM

## 2024-04-05 DIAGNOSIS — N39.46 MIXED INCONTINENCE: ICD-10-CM

## 2024-04-05 DIAGNOSIS — N18.31 CKD STAGE G3A/A2, GFR 45-59 AND ALBUMIN CREATININE RATIO 30-299 MG/G (H): ICD-10-CM

## 2024-04-05 LAB
ALBUMIN SERPL BCG-MCNC: 3.8 G/DL (ref 3.5–5.2)
ALBUMIN UR-MCNC: 600 MG/DL
ALP SERPL-CCNC: 139 U/L (ref 40–150)
ALT SERPL W P-5'-P-CCNC: 15 U/L (ref 0–50)
AMMONIA PLAS-SCNC: 29 UMOL/L (ref 11–51)
ANION GAP SERPL CALCULATED.3IONS-SCNC: 11 MMOL/L (ref 7–15)
APPEARANCE UR: CLEAR
AST SERPL W P-5'-P-CCNC: 28 U/L (ref 0–45)
BACTERIA #/AREA URNS HPF: ABNORMAL /HPF
BILIRUB SERPL-MCNC: 0.3 MG/DL
BILIRUB UR QL STRIP: NEGATIVE
BUN SERPL-MCNC: 36 MG/DL (ref 8–23)
CALCIUM SERPL-MCNC: 8.8 MG/DL (ref 8.8–10.2)
CHLORIDE SERPL-SCNC: 112 MMOL/L (ref 98–107)
COLOR UR AUTO: ABNORMAL
CREAT SERPL-MCNC: 1.84 MG/DL (ref 0.51–0.95)
DEPRECATED HCO3 PLAS-SCNC: 17 MMOL/L (ref 22–29)
EGFRCR SERPLBLD CKD-EPI 2021: 29 ML/MIN/1.73M2
ERYTHROCYTE [DISTWIDTH] IN BLOOD BY AUTOMATED COUNT: 15.9 % (ref 10–15)
GLUCOSE SERPL-MCNC: 198 MG/DL (ref 70–99)
GLUCOSE UR STRIP-MCNC: NEGATIVE MG/DL
HCT VFR BLD AUTO: 26.1 % (ref 35–47)
HGB BLD-MCNC: 8.4 G/DL (ref 11.7–15.7)
HGB UR QL STRIP: ABNORMAL
INR PPP: 1.32 (ref 0.85–1.15)
KETONES UR STRIP-MCNC: NEGATIVE MG/DL
LEUKOCYTE ESTERASE UR QL STRIP: NEGATIVE
MCH RBC QN AUTO: 28.7 PG (ref 26.5–33)
MCHC RBC AUTO-ENTMCNC: 32.2 G/DL (ref 31.5–36.5)
MCV RBC AUTO: 89 FL (ref 78–100)
NITRATE UR QL: NEGATIVE
PH UR STRIP: 6 [PH] (ref 5–7)
PLATELET # BLD AUTO: 101 10E3/UL (ref 150–450)
POTASSIUM SERPL-SCNC: 5.1 MMOL/L (ref 3.4–5.3)
PROT SERPL-MCNC: 7.3 G/DL (ref 6.4–8.3)
RBC # BLD AUTO: 2.93 10E6/UL (ref 3.8–5.2)
RBC #/AREA URNS AUTO: ABNORMAL /HPF
RENAL EPI CELLS #/AREA URNS HPF: ABNORMAL /HPF
SODIUM SERPL-SCNC: 140 MMOL/L (ref 135–145)
SP GR UR STRIP: 1.01 (ref 1–1.03)
SQUAMOUS #/AREA URNS AUTO: ABNORMAL /LPF
T4 FREE SERPL-MCNC: 1.08 NG/DL (ref 0.9–1.7)
TSH SERPL DL<=0.005 MIU/L-ACNC: 4.35 UIU/ML (ref 0.3–4.2)
UROBILINOGEN UR STRIP-MCNC: NORMAL MG/DL
WBC # BLD AUTO: 5.1 10E3/UL (ref 4–11)
WBC #/AREA URNS AUTO: ABNORMAL /HPF

## 2024-04-05 PROCEDURE — 82570 ASSAY OF URINE CREATININE: CPT

## 2024-04-05 PROCEDURE — 84439 ASSAY OF FREE THYROXINE: CPT

## 2024-04-05 PROCEDURE — 82140 ASSAY OF AMMONIA: CPT

## 2024-04-05 PROCEDURE — 82043 UR ALBUMIN QUANTITATIVE: CPT

## 2024-04-05 PROCEDURE — 81001 URINALYSIS AUTO W/SCOPE: CPT

## 2024-04-05 PROCEDURE — 36415 COLL VENOUS BLD VENIPUNCTURE: CPT

## 2024-04-05 PROCEDURE — 84443 ASSAY THYROID STIM HORMONE: CPT

## 2024-04-05 PROCEDURE — 85027 COMPLETE CBC AUTOMATED: CPT

## 2024-04-05 PROCEDURE — 80053 COMPREHEN METABOLIC PANEL: CPT

## 2024-04-05 PROCEDURE — 85610 PROTHROMBIN TIME: CPT

## 2024-04-06 LAB
CREAT UR-MCNC: 59.1 MG/DL
MICROALBUMIN UR-MCNC: 3797 MG/L
MICROALBUMIN/CREAT UR: 6424.7 MG/G CR (ref 0–25)

## 2024-04-18 ENCOUNTER — LAB (OUTPATIENT)
Dept: LAB | Facility: CLINIC | Age: 69
End: 2024-04-18
Payer: MEDICARE

## 2024-04-18 DIAGNOSIS — N18.32 CHRONIC KIDNEY DISEASE (CKD) STAGE G3B/A1, MODERATELY DECREASED GLOMERULAR FILTRATION RATE (GFR) BETWEEN 30-44 ML/MIN/1.73 SQUARE METER AND ALBUMINURIA CREATININE RATIO LESS THAN 30 MG/G (H): ICD-10-CM

## 2024-04-18 DIAGNOSIS — D83.1: ICD-10-CM

## 2024-04-18 DIAGNOSIS — N18.9 ANEMIA OF CHRONIC RENAL FAILURE: ICD-10-CM

## 2024-04-18 DIAGNOSIS — N17.9 ACUTE KIDNEY FAILURE, UNSPECIFIED (H): ICD-10-CM

## 2024-04-18 DIAGNOSIS — I10 ESSENTIAL HYPERTENSION, MALIGNANT: ICD-10-CM

## 2024-04-18 DIAGNOSIS — D63.1 ANEMIA OF CHRONIC RENAL FAILURE: ICD-10-CM

## 2024-04-18 DIAGNOSIS — K70.31 ALCOHOLIC CIRRHOSIS OF LIVER WITH ASCITES (H): ICD-10-CM

## 2024-04-18 LAB
ALBUMIN MFR UR ELPH: 336 MG/DL
ALBUMIN SERPL BCG-MCNC: 3.6 G/DL (ref 3.5–5.2)
ALBUMIN UR-MCNC: 300 MG/DL
ANION GAP SERPL CALCULATED.3IONS-SCNC: 14 MMOL/L (ref 7–15)
APPEARANCE UR: CLEAR
BASOPHILS # BLD AUTO: 0 10E3/UL (ref 0–0.2)
BASOPHILS NFR BLD AUTO: 1 %
BILIRUB UR QL STRIP: NEGATIVE
BUN SERPL-MCNC: 37.1 MG/DL (ref 8–23)
CALCIUM SERPL-MCNC: 9 MG/DL (ref 8.8–10.2)
CHLORIDE SERPL-SCNC: 109 MMOL/L (ref 98–107)
COLOR UR AUTO: ABNORMAL
CREAT SERPL-MCNC: 1.81 MG/DL (ref 0.51–0.95)
CREAT UR-MCNC: 23.9 MG/DL
DEPRECATED HCO3 PLAS-SCNC: 15 MMOL/L (ref 22–29)
EGFRCR SERPLBLD CKD-EPI 2021: 30 ML/MIN/1.73M2
EOSINOPHIL # BLD AUTO: 0.1 10E3/UL (ref 0–0.7)
EOSINOPHIL NFR BLD AUTO: 2 %
ERYTHROCYTE [DISTWIDTH] IN BLOOD BY AUTOMATED COUNT: 15.7 % (ref 10–15)
FERRITIN SERPL-MCNC: 275 NG/ML (ref 11–328)
GLUCOSE SERPL-MCNC: 198 MG/DL (ref 70–99)
GLUCOSE UR STRIP-MCNC: NEGATIVE MG/DL
HCT VFR BLD AUTO: 28.9 % (ref 35–47)
HGB BLD-MCNC: 9.2 G/DL (ref 11.7–15.7)
HGB UR QL STRIP: ABNORMAL
IMM GRANULOCYTES # BLD: 0 10E3/UL
IMM GRANULOCYTES NFR BLD: 1 %
IRON BINDING CAPACITY (ROCHE): 223 UG/DL (ref 240–430)
IRON SATN MFR SERPL: 23 % (ref 15–46)
IRON SERPL-MCNC: 51 UG/DL (ref 37–145)
KETONES UR STRIP-MCNC: NEGATIVE MG/DL
LEUKOCYTE ESTERASE UR QL STRIP: NEGATIVE
LYMPHOCYTES # BLD AUTO: 1.1 10E3/UL (ref 0.8–5.3)
LYMPHOCYTES NFR BLD AUTO: 21 %
MCH RBC QN AUTO: 27.9 PG (ref 26.5–33)
MCHC RBC AUTO-ENTMCNC: 31.8 G/DL (ref 31.5–36.5)
MCV RBC AUTO: 88 FL (ref 78–100)
MONOCYTES # BLD AUTO: 0.2 10E3/UL (ref 0–1.3)
MONOCYTES NFR BLD AUTO: 4 %
NEUTROPHILS # BLD AUTO: 4 10E3/UL (ref 1.6–8.3)
NEUTROPHILS NFR BLD AUTO: 73 %
NITRATE UR QL: NEGATIVE
NRBC # BLD AUTO: 0 10E3/UL
NRBC BLD AUTO-RTO: 0 /100
PH UR STRIP: 6 [PH] (ref 5–7)
PHOSPHATE SERPL-MCNC: 5.1 MG/DL (ref 2.5–4.5)
PLATELET # BLD AUTO: 155 10E3/UL (ref 150–450)
POTASSIUM SERPL-SCNC: 4.8 MMOL/L (ref 3.4–5.3)
PROT/CREAT 24H UR: 14.06 MG/MG CR (ref 0–0.2)
RBC # BLD AUTO: 3.3 10E6/UL (ref 3.8–5.2)
RBC #/AREA URNS AUTO: ABNORMAL /HPF
SKIP: ABNORMAL
SODIUM SERPL-SCNC: 138 MMOL/L (ref 135–145)
SP GR UR STRIP: 1.01 (ref 1–1.03)
SQUAMOUS #/AREA URNS AUTO: ABNORMAL /LPF
UROBILINOGEN UR STRIP-MCNC: NORMAL MG/DL
WBC # BLD AUTO: 5.5 10E3/UL (ref 4–11)
WBC #/AREA URNS AUTO: ABNORMAL /HPF

## 2024-04-18 PROCEDURE — 81001 URINALYSIS AUTO W/SCOPE: CPT

## 2024-04-18 PROCEDURE — 83970 ASSAY OF PARATHORMONE: CPT

## 2024-04-18 PROCEDURE — 84156 ASSAY OF PROTEIN URINE: CPT

## 2024-04-18 PROCEDURE — 83540 ASSAY OF IRON: CPT

## 2024-04-18 PROCEDURE — 85025 COMPLETE CBC W/AUTO DIFF WBC: CPT

## 2024-04-18 PROCEDURE — 80069 RENAL FUNCTION PANEL: CPT

## 2024-04-18 PROCEDURE — 82728 ASSAY OF FERRITIN: CPT

## 2024-04-18 PROCEDURE — 36415 COLL VENOUS BLD VENIPUNCTURE: CPT

## 2024-04-18 PROCEDURE — 83550 IRON BINDING TEST: CPT

## 2024-04-19 LAB — PTH-INTACT SERPL-MCNC: 43 PG/ML (ref 15–65)

## 2024-04-23 ENCOUNTER — TRANSFERRED RECORDS (OUTPATIENT)
Dept: HEALTH INFORMATION MANAGEMENT | Facility: CLINIC | Age: 69
End: 2024-04-23

## 2024-05-16 DIAGNOSIS — Z79.899 ENCOUNTER FOR MONITORING SUBOXONE MAINTENANCE THERAPY: ICD-10-CM

## 2024-05-16 DIAGNOSIS — Z51.81 ENCOUNTER FOR MONITORING SUBOXONE MAINTENANCE THERAPY: ICD-10-CM

## 2024-05-16 RX ORDER — BUPRENORPHINE HYDROCHLORIDE AND NALOXONE HYDROCHLORIDE DIHYDRATE 2; .5 MG/1; MG/1
1 TABLET SUBLINGUAL DAILY
Qty: 60 TABLET | Refills: 0 | Status: SHIPPED | OUTPATIENT
Start: 2024-05-16 | End: 2024-06-16

## 2024-05-24 DIAGNOSIS — R80.1 PERSISTENT PROTEINURIA: ICD-10-CM

## 2024-05-24 DIAGNOSIS — N18.32 CHRONIC KIDNEY DISEASE (CKD) STAGE G3B/A1, MODERATELY DECREASED GLOMERULAR FILTRATION RATE (GFR) BETWEEN 30-44 ML/MIN/1.73 SQUARE METER AND ALBUMINURIA CREATININE RATIO LESS THAN 30 MG/G (H): Primary | ICD-10-CM

## 2024-05-25 ENCOUNTER — HEALTH MAINTENANCE LETTER (OUTPATIENT)
Age: 69
End: 2024-05-25

## 2024-06-14 DIAGNOSIS — Z79.899 ENCOUNTER FOR MONITORING SUBOXONE MAINTENANCE THERAPY: ICD-10-CM

## 2024-06-14 DIAGNOSIS — Z51.81 ENCOUNTER FOR MONITORING SUBOXONE MAINTENANCE THERAPY: ICD-10-CM

## 2024-06-16 RX ORDER — BUPRENORPHINE HYDROCHLORIDE AND NALOXONE HYDROCHLORIDE DIHYDRATE 2; .5 MG/1; MG/1
1 TABLET SUBLINGUAL 2 TIMES DAILY
Qty: 60 TABLET | Refills: 0 | Status: SHIPPED | OUTPATIENT
Start: 2024-06-16 | End: 2024-07-24

## 2024-06-29 DIAGNOSIS — I10 HYPERTENSION, BENIGN ESSENTIAL, GOAL BELOW 140/90: ICD-10-CM

## 2024-07-01 ENCOUNTER — TRANSFERRED RECORDS (OUTPATIENT)
Dept: HEALTH INFORMATION MANAGEMENT | Facility: CLINIC | Age: 69
End: 2024-07-01
Payer: MEDICARE

## 2024-07-01 RX ORDER — AMLODIPINE BESYLATE 5 MG/1
TABLET ORAL
Qty: 90 TABLET | Refills: 0 | Status: SHIPPED | OUTPATIENT
Start: 2024-07-01

## 2024-07-01 RX ORDER — CLONIDINE HYDROCHLORIDE 0.1 MG/1
0.1 TABLET ORAL 2 TIMES DAILY
Qty: 180 TABLET | Refills: 0 | Status: SHIPPED | OUTPATIENT
Start: 2024-07-01

## 2024-07-02 DIAGNOSIS — N18.32 CHRONIC KIDNEY DISEASE (CKD) STAGE G3B/A1, MODERATELY DECREASED GLOMERULAR FILTRATION RATE (GFR) BETWEEN 30-44 ML/MIN/1.73 SQUARE METER AND ALBUMINURIA CREATININE RATIO LESS THAN 30 MG/G (H): Primary | ICD-10-CM

## 2024-07-02 DIAGNOSIS — R80.1 PERSISTENT PROTEINURIA: ICD-10-CM

## 2024-07-02 DIAGNOSIS — N18.9 ANEMIA OF CHRONIC RENAL FAILURE: ICD-10-CM

## 2024-07-02 DIAGNOSIS — K70.31 ALCOHOLIC CIRRHOSIS OF LIVER WITH ASCITES (H): ICD-10-CM

## 2024-07-02 DIAGNOSIS — I10 HYPERTENSION: ICD-10-CM

## 2024-07-02 DIAGNOSIS — D63.1 ANEMIA OF CHRONIC RENAL FAILURE: ICD-10-CM

## 2024-07-10 ENCOUNTER — TRANSFERRED RECORDS (OUTPATIENT)
Dept: HEALTH INFORMATION MANAGEMENT | Facility: CLINIC | Age: 69
End: 2024-07-10
Payer: MEDICARE

## 2024-07-16 ENCOUNTER — TRANSFERRED RECORDS (OUTPATIENT)
Dept: HEALTH INFORMATION MANAGEMENT | Facility: CLINIC | Age: 69
End: 2024-07-16
Payer: MEDICARE

## 2024-07-23 DIAGNOSIS — Z79.899 ENCOUNTER FOR MONITORING SUBOXONE MAINTENANCE THERAPY: ICD-10-CM

## 2024-07-23 DIAGNOSIS — Z51.81 ENCOUNTER FOR MONITORING SUBOXONE MAINTENANCE THERAPY: ICD-10-CM

## 2024-07-24 ENCOUNTER — OFFICE VISIT (OUTPATIENT)
Dept: DERMATOLOGY | Facility: CLINIC | Age: 69
End: 2024-07-24
Payer: MEDICARE

## 2024-07-24 DIAGNOSIS — D18.01 CHERRY ANGIOMA: ICD-10-CM

## 2024-07-24 DIAGNOSIS — L72.0 EPIDERMAL CYST: Primary | ICD-10-CM

## 2024-07-24 DIAGNOSIS — L82.1 SEBORRHEIC KERATOSES: ICD-10-CM

## 2024-07-24 PROCEDURE — 99203 OFFICE O/P NEW LOW 30 MIN: CPT | Performed by: NURSE PRACTITIONER

## 2024-07-24 RX ORDER — BUPRENORPHINE HYDROCHLORIDE AND NALOXONE HYDROCHLORIDE DIHYDRATE 2; .5 MG/1; MG/1
1 TABLET SUBLINGUAL 2 TIMES DAILY
Qty: 60 TABLET | Refills: 0 | Status: SHIPPED | OUTPATIENT
Start: 2024-07-24 | End: 2024-09-13

## 2024-07-24 RX ORDER — PRAMIPEXOLE DIHYDROCHLORIDE 0.12 MG/1
TABLET ORAL
Qty: 90 TABLET | Refills: 1 | Status: SHIPPED | OUTPATIENT
Start: 2024-07-24

## 2024-07-24 NOTE — PROGRESS NOTES
[facility] Dermatology Note  Encounter Date: Jul 24, 2024  Office Visit     Reviewed patients past medical history and pertinent chart review prior to patients visit today.     Dermatology Problem List:  Cyst of right cheek    Patient denies personal history of skin cancer or dysplastic nevi.      ____________________________________________    Assessment & Plan:     # Cyst. Benign, no further treatment needed. Discussed excision if patient is bothered by the lesion due to irritation. Patient declines referral for removal     # Benign skin findings including: seborrheic keratoses, cherry angioma  - No further intervention required. Patient to report changes.   - Patient reassured of the benign nature of these lesions.    #Reviewed Sunscreen: Apply 20 minutes prior to going outdoors and reapply every two hours, when wet or sweating. We recommend using an SPF 30 or higher, and to use one that is water resistant.       Follow-up:  OREN Alonso, ANUPAM  Dermatology    _______________________________________    CC: Derm Problem (Check lump on right cheek )    HPI:  Ms. Josette Palacios is a(n) 69 year old female who presents today as a new patient for spots of concern on the trunk and face.  She sometimes squeezes a spot on the right cheek and makes it inflamed to get some drainage out of it.  It continues to fill back up.  The other spots are asymptomatic but she does have new brown and red spots on the trunk.    Patient is otherwise feeling well, without additional skin concerns.      Physical Exam:  SKIN: Focused examination of abdomen, back, face was performed.  -Brown cerebriform scaly papules on the trunk and face  -There are 1-2mm red dome shaped symmetric papules scattered on the trunk   -right cheek, open comedone with underlying subcutaneous movable firm papule and face Less than 1 cm in diameter    - No other lesions of concern on areas examined.     Medications:  Current Outpatient  Medications   Medication Sig Dispense Refill    ACE/ARB/ARNI NOT PRESCRIBED (INTENTIONAL) Please choose reason not prescribed from choices below.      amLODIPine (NORVASC) 5 MG tablet TAKE 1 TABLET(5 MG) BY MOUTH DAILY 90 tablet 0    buprenorphine-naloxone (SUBOXONE) 2-0.5 MG SUBL sublingual tablet Place 1 tablet under the tongue 2 times daily 60 tablet 0    cloNIDine (CATAPRES) 0.1 MG tablet TAKE 1 TABLET(0.1 MG) BY MOUTH TWICE DAILY 180 tablet 0    Ferrous Sulfate 324 (65 Fe) MG TBEC       lisinopril (ZESTRIL) 5 MG tablet TAKE 1 TABLET(5 MG) BY MOUTH DAILY 90 tablet 3    multivitamin, therapeutic (THERA-VIT) TABS tablet Take 1 tablet by mouth daily      pramipexole (MIRAPEX) 0.125 MG tablet TAKE 1 TABLET(0.125 MG) BY MOUTH AT BEDTIME 90 tablet 3    sertraline (ZOLOFT) 50 MG tablet Take 2 tablets (100 mg) by mouth daily      valACYclovir (VALTREX) 500 MG tablet TAKE ONE TABLET BY MOUTH TWICE DAILY AS NEEDED 20 tablet 3     No current facility-administered medications for this visit.      Past Medical History:   Patient Active Problem List   Diagnosis    Nicotine dependence    Anxiety associated with depression    Hypertension, benign essential, goal below 140/90    Alcohol dependence with other alcohol-induced disorder (H)    Low vitamin D level    Encounter for monitoring Suboxone maintenance therapy    Eczema, unspecified type    Chronic hepatitis C without hepatic coma (H)    History of herpes genitalis    CKD stage G3a/A2, GFR 45-59 and albumin creatinine ratio  mg/g (H)    Coagulopathy (H24)    Ascites due to alcoholic hepatitis (H28)    History of heroin abuse (H)    Diabetes mellitus, type 2 (H)    Bleeds easily (H24)    Thrombocytopenia (H24)    Alcoholic cirrhosis of liver with ascites (H)    Insomnia, unspecified type    Lymphedema    Protein deficiency (H24)    Moderate episode of recurrent major depressive disorder (H)    Abstinence from alcohol    Elevated blood pressure reading with diagnosis of  hypertension     Past Medical History:   Diagnosis Date    Anxiety associated with depression 1/17/2014    Ascites     Chronic hepatitis C without hepatic coma (H) 4/3/2019    Hepatic encephalopathy (H) 2/8/2014    Nicotine dependence        CC Referred Self, MD  No address on file on close of this encounter.

## 2024-07-24 NOTE — LETTER
7/24/2024      Josette Palacios  [address]  [facility] [address]      Dear Colleague,    Thank you for referring your patient, Josette Palacios, to the [facility]. Please see a copy of my visit note below.    [facility] Dermatology Note  Encounter Date: Jul 24, 2024  Office Visit     Reviewed patients past medical history and pertinent chart review prior to patients visit today.     Dermatology Problem List:  Cyst of right cheek    Patient denies personal history of skin cancer or dysplastic nevi.      ____________________________________________    Assessment & Plan:     # Cyst. Benign, no further treatment needed. Discussed excision if patient is bothered by the lesion due to irritation. Patient declines referral for removal     # Benign skin findings including: seborrheic keratoses, cherry angioma  - No further intervention required. Patient to report changes.   - Patient reassured of the benign nature of these lesions.    #Reviewed Sunscreen: Apply 20 minutes prior to going outdoors and reapply every two hours, when wet or sweating. We recommend using an SPF 30 or higher, and to use one that is water resistant.       Follow-up:  OREN Alonso, ANUPAM  Dermatology    _______________________________________    CC: Derm Problem (Check lump on right cheek )    HPI:  Ms. Josette Palacios is a(n) 69 year old female who presents today as a new patient for spots of concern on the trunk and face.  She sometimes squeezes a spot on the right cheek and makes it inflamed to get some drainage out of it.  It continues to fill back up.  The other spots are asymptomatic but she does have new brown and red spots on the trunk.    Patient is otherwise feeling well, without additional skin concerns.      Physical Exam:  SKIN: Focused examination of abdomen, back, face was performed.  -Brown cerebriform scaly papules on the trunk and face  -There are 1-2mm red dome  shaped symmetric papules scattered on the trunk   -right cheek, open comedone with underlying subcutaneous movable firm papule and face Less than 1 cm in diameter    - No other lesions of concern on areas examined.     Medications:  Current Outpatient Medications   Medication Sig Dispense Refill     ACE/ARB/ARNI NOT PRESCRIBED (INTENTIONAL) Please choose reason not prescribed from choices below.       amLODIPine (NORVASC) 5 MG tablet TAKE 1 TABLET(5 MG) BY MOUTH DAILY 90 tablet 0     buprenorphine-naloxone (SUBOXONE) 2-0.5 MG SUBL sublingual tablet Place 1 tablet under the tongue 2 times daily 60 tablet 0     cloNIDine (CATAPRES) 0.1 MG tablet TAKE 1 TABLET(0.1 MG) BY MOUTH TWICE DAILY 180 tablet 0     Ferrous Sulfate 324 (65 Fe) MG TBEC        lisinopril (ZESTRIL) 5 MG tablet TAKE 1 TABLET(5 MG) BY MOUTH DAILY 90 tablet 3     multivitamin, therapeutic (THERA-VIT) TABS tablet Take 1 tablet by mouth daily       pramipexole (MIRAPEX) 0.125 MG tablet TAKE 1 TABLET(0.125 MG) BY MOUTH AT BEDTIME 90 tablet 3     sertraline (ZOLOFT) 50 MG tablet Take 2 tablets (100 mg) by mouth daily       valACYclovir (VALTREX) 500 MG tablet TAKE ONE TABLET BY MOUTH TWICE DAILY AS NEEDED 20 tablet 3     No current facility-administered medications for this visit.      Past Medical History:   Patient Active Problem List   Diagnosis     Nicotine dependence     Anxiety associated with depression     Hypertension, benign essential, goal below 140/90     Alcohol dependence with other alcohol-induced disorder (H)     Low vitamin D level     Encounter for monitoring Suboxone maintenance therapy     Eczema, unspecified type     Chronic hepatitis C without hepatic coma (H)     History of herpes genitalis     CKD stage G3a/A2, GFR 45-59 and albumin creatinine ratio  mg/g (H)     Coagulopathy (H24)     Ascites due to alcoholic hepatitis (H28)     History of heroin abuse (H)     Diabetes mellitus, type 2 (H)     Bleeds easily (H24)      Thrombocytopenia (H24)     Alcoholic cirrhosis of liver with ascites (H)     Insomnia, unspecified type     Lymphedema     Protein deficiency (H24)     Moderate episode of recurrent major depressive disorder (H)     Abstinence from alcohol     Elevated blood pressure reading with diagnosis of hypertension     Past Medical History:   Diagnosis Date     Anxiety associated with depression 1/17/2014     Ascites      Chronic hepatitis C without hepatic coma (H) 4/3/2019     Hepatic encephalopathy (H) 2/8/2014     Nicotine dependence        CC Referred Self, MD  No address on file on close of this encounter.       Again, thank you for allowing me to participate in the care of your patient.        Sincerely,        SEAN Serrano CNP

## 2024-08-03 ENCOUNTER — HEALTH MAINTENANCE LETTER (OUTPATIENT)
Age: 69
End: 2024-08-03

## 2024-09-05 ENCOUNTER — TRANSFERRED RECORDS (OUTPATIENT)
Dept: HEALTH INFORMATION MANAGEMENT | Facility: CLINIC | Age: 69
End: 2024-09-05

## 2024-09-12 DIAGNOSIS — Z51.81 ENCOUNTER FOR MONITORING SUBOXONE MAINTENANCE THERAPY: ICD-10-CM

## 2024-09-12 DIAGNOSIS — Z79.899 ENCOUNTER FOR MONITORING SUBOXONE MAINTENANCE THERAPY: ICD-10-CM

## 2024-09-13 RX ORDER — BUPRENORPHINE HYDROCHLORIDE AND NALOXONE HYDROCHLORIDE DIHYDRATE 2; .5 MG/1; MG/1
1 TABLET SUBLINGUAL 2 TIMES DAILY
Qty: 60 TABLET | Refills: 0 | Status: SHIPPED | OUTPATIENT
Start: 2024-09-13 | End: 2024-10-13

## 2024-10-01 ENCOUNTER — TRANSCRIBE ORDERS (OUTPATIENT)
Dept: LAB | Facility: CLINIC | Age: 69
End: 2024-10-01
Payer: MEDICARE

## 2024-10-01 DIAGNOSIS — N18.4 CHRONIC KIDNEY DISEASE, STAGE IV (SEVERE) (H): Primary | ICD-10-CM

## 2024-10-01 PROBLEM — Z78.9 OTHER SPECIFIED HEALTH STATUS: Status: ACTIVE | Noted: 2024-01-21

## 2024-10-02 DIAGNOSIS — N18.4 CHRONIC KIDNEY DISEASE, STAGE IV (SEVERE) (H): Primary | ICD-10-CM

## 2024-10-09 DIAGNOSIS — N18.9 ANEMIA OF CHRONIC RENAL FAILURE: ICD-10-CM

## 2024-10-09 DIAGNOSIS — R80.1 PERSISTENT PROTEINURIA: ICD-10-CM

## 2024-10-09 DIAGNOSIS — N18.32 CHRONIC KIDNEY DISEASE (CKD) STAGE G3B/A1, MODERATELY DECREASED GLOMERULAR FILTRATION RATE (GFR) BETWEEN 30-44 ML/MIN/1.73 SQUARE METER AND ALBUMINURIA CREATININE RATIO LESS THAN 30 MG/G (H): Primary | ICD-10-CM

## 2024-10-09 DIAGNOSIS — D63.1 ANEMIA OF CHRONIC RENAL FAILURE: ICD-10-CM

## 2024-10-09 DIAGNOSIS — I10 ESSENTIAL HYPERTENSION, MALIGNANT: ICD-10-CM

## 2024-10-09 DIAGNOSIS — K70.31 ALCOHOLIC CIRRHOSIS OF LIVER WITH ASCITES (H): ICD-10-CM

## 2024-10-14 ENCOUNTER — PATIENT OUTREACH (OUTPATIENT)
Dept: CARE COORDINATION | Facility: CLINIC | Age: 69
End: 2024-10-14

## 2024-10-17 ENCOUNTER — LAB (OUTPATIENT)
Dept: LAB | Facility: CLINIC | Age: 69
End: 2024-10-17
Payer: MEDICARE

## 2024-10-17 DIAGNOSIS — N18.32 CHRONIC KIDNEY DISEASE (CKD) STAGE G3B/A1, MODERATELY DECREASED GLOMERULAR FILTRATION RATE (GFR) BETWEEN 30-44 ML/MIN/1.73 SQUARE METER AND ALBUMINURIA CREATININE RATIO LESS THAN 30 MG/G (H): ICD-10-CM

## 2024-10-17 DIAGNOSIS — N18.9 ANEMIA OF CHRONIC RENAL FAILURE: ICD-10-CM

## 2024-10-17 DIAGNOSIS — R80.1 PERSISTENT PROTEINURIA: ICD-10-CM

## 2024-10-17 DIAGNOSIS — N18.4 CHRONIC KIDNEY DISEASE, STAGE IV (SEVERE) (H): ICD-10-CM

## 2024-10-17 DIAGNOSIS — D63.1 ANEMIA OF CHRONIC RENAL FAILURE: ICD-10-CM

## 2024-10-17 DIAGNOSIS — K70.31 ALCOHOLIC CIRRHOSIS OF LIVER WITH ASCITES (H): ICD-10-CM

## 2024-10-17 DIAGNOSIS — I10 ESSENTIAL HYPERTENSION, MALIGNANT: ICD-10-CM

## 2024-10-17 LAB
ALBUMIN MFR UR ELPH: 334 MG/DL
ALBUMIN SERPL BCG-MCNC: 3.4 G/DL (ref 3.5–5.2)
ALBUMIN UR-MCNC: 300 MG/DL
ANION GAP SERPL CALCULATED.3IONS-SCNC: 11 MMOL/L (ref 7–15)
APPEARANCE UR: CLEAR
BACTERIA #/AREA URNS HPF: ABNORMAL /HPF
BILIRUB UR QL STRIP: NEGATIVE
BUN SERPL-MCNC: 40 MG/DL (ref 8–23)
CALCIUM SERPL-MCNC: 8.5 MG/DL (ref 8.8–10.4)
CHLORIDE SERPL-SCNC: 107 MMOL/L (ref 98–107)
COLOR UR AUTO: ABNORMAL
CREAT SERPL-MCNC: 2.76 MG/DL (ref 0.51–0.95)
CREAT UR-MCNC: 48.1 MG/DL
CYSTATIN C (ROCHE): 4 MG/L (ref 0.6–1)
EGFRCR SERPLBLD CKD-EPI 2021: 18 ML/MIN/1.73M2
ERYTHROCYTE [DISTWIDTH] IN BLOOD BY AUTOMATED COUNT: 14.2 % (ref 10–15)
GFR/BSA.PRED SERPLBLD CYS-BASED-ARV: 11 ML/MIN/1.73M2
GLUCOSE SERPL-MCNC: 144 MG/DL (ref 70–99)
GLUCOSE UR STRIP-MCNC: NEGATIVE MG/DL
HCO3 SERPL-SCNC: 21 MMOL/L (ref 22–29)
HCT VFR BLD AUTO: 22.8 % (ref 35–47)
HGB BLD-MCNC: 7.3 G/DL (ref 11.7–15.7)
HGB UR QL STRIP: NEGATIVE
KETONES UR STRIP-MCNC: NEGATIVE MG/DL
LEUKOCYTE ESTERASE UR QL STRIP: NEGATIVE
MCH RBC QN AUTO: 29.6 PG (ref 26.5–33)
MCHC RBC AUTO-ENTMCNC: 32 G/DL (ref 31.5–36.5)
MCV RBC AUTO: 92 FL (ref 78–100)
NITRATE UR QL: NEGATIVE
PH UR STRIP: 6 [PH] (ref 5–7)
PHOSPHATE SERPL-MCNC: 4.5 MG/DL (ref 2.5–4.5)
PLATELET # BLD AUTO: 129 10E3/UL (ref 150–450)
POTASSIUM SERPL-SCNC: 5.2 MMOL/L (ref 3.4–5.3)
PROT/CREAT 24H UR: 6.94 MG/MG CR (ref 0–0.2)
RBC # BLD AUTO: 2.47 10E6/UL (ref 3.8–5.2)
RBC #/AREA URNS AUTO: ABNORMAL /HPF
SKIP: ABNORMAL
SODIUM SERPL-SCNC: 139 MMOL/L (ref 135–145)
SP GR UR STRIP: 1.01 (ref 1–1.03)
SQUAMOUS #/AREA URNS AUTO: ABNORMAL /LPF
UROBILINOGEN UR STRIP-MCNC: NORMAL MG/DL
WBC # BLD AUTO: 6.1 10E3/UL (ref 4–11)
WBC #/AREA URNS AUTO: ABNORMAL /HPF

## 2024-10-17 PROCEDURE — 36415 COLL VENOUS BLD VENIPUNCTURE: CPT

## 2024-10-17 PROCEDURE — 82610 CYSTATIN C: CPT

## 2024-10-17 PROCEDURE — 85027 COMPLETE CBC AUTOMATED: CPT

## 2024-10-17 PROCEDURE — 80069 RENAL FUNCTION PANEL: CPT

## 2024-10-17 PROCEDURE — 81001 URINALYSIS AUTO W/SCOPE: CPT

## 2024-10-17 PROCEDURE — 84156 ASSAY OF PROTEIN URINE: CPT

## 2024-10-23 ENCOUNTER — OFFICE VISIT (OUTPATIENT)
Dept: FAMILY MEDICINE | Facility: CLINIC | Age: 69
End: 2024-10-23
Payer: MEDICARE

## 2024-10-23 VITALS
TEMPERATURE: 98.6 F | WEIGHT: 120.5 LBS | DIASTOLIC BLOOD PRESSURE: 60 MMHG | HEART RATE: 81 BPM | OXYGEN SATURATION: 98 % | SYSTOLIC BLOOD PRESSURE: 150 MMHG | BODY MASS INDEX: 19.37 KG/M2 | RESPIRATION RATE: 16 BRPM | HEIGHT: 66 IN

## 2024-10-23 DIAGNOSIS — F43.0 ACUTE REACTION TO STRESS: ICD-10-CM

## 2024-10-23 DIAGNOSIS — F17.210 CIGARETTE NICOTINE DEPENDENCE WITHOUT COMPLICATION: ICD-10-CM

## 2024-10-23 DIAGNOSIS — E53.8 LOW SERUM VITAMIN B12: ICD-10-CM

## 2024-10-23 DIAGNOSIS — N18.31 CKD STAGE G3A/A2, GFR 45-59 AND ALBUMIN CREATININE RATIO 30-299 MG/G (H): ICD-10-CM

## 2024-10-23 DIAGNOSIS — F10.21 ALCOHOL DEPENDENCE IN REMISSION (H): Primary | ICD-10-CM

## 2024-10-23 DIAGNOSIS — Z23 NEED FOR VACCINATION: ICD-10-CM

## 2024-10-23 DIAGNOSIS — R06.00 PND (PAROXYSMAL NOCTURNAL DYSPNEA): ICD-10-CM

## 2024-10-23 DIAGNOSIS — F11.20 CONTINUOUS OPIOID DEPENDENCE (H): ICD-10-CM

## 2024-10-23 PROCEDURE — 99214 OFFICE O/P EST MOD 30 MIN: CPT | Performed by: FAMILY MEDICINE

## 2024-10-23 PROCEDURE — 90480 ADMN SARSCOV2 VAC 1/ONLY CMP: CPT | Performed by: FAMILY MEDICINE

## 2024-10-23 PROCEDURE — 91320 SARSCV2 VAC 30MCG TRS-SUC IM: CPT | Performed by: FAMILY MEDICINE

## 2024-10-23 RX ORDER — NICOTINE 21 MG/24HR
1 PATCH, TRANSDERMAL 24 HOURS TRANSDERMAL EVERY 24 HOURS
Qty: 30 PATCH | Refills: 5 | Status: SHIPPED | OUTPATIENT
Start: 2024-10-23

## 2024-10-23 RX ORDER — ACETAMINOPHEN 500 MG
TABLET ORAL
COMMUNITY

## 2024-10-23 RX ORDER — SODIUM BICARBONATE 650 MG/1
650 TABLET ORAL
COMMUNITY
Start: 2024-10-05

## 2024-10-23 RX ORDER — HYDROXYZINE PAMOATE 25 MG/1
25 CAPSULE ORAL
COMMUNITY
Start: 2024-09-15

## 2024-10-23 RX ORDER — FUROSEMIDE 20 MG/1
TABLET ORAL
COMMUNITY
Start: 2024-09-29

## 2024-10-23 RX ORDER — CEPHALEXIN 500 MG/1
CAPSULE ORAL
COMMUNITY
Start: 2024-09-15

## 2024-10-23 RX ORDER — AMOXICILLIN 500 MG/1
TABLET, FILM COATED ORAL
COMMUNITY
Start: 2024-04-15

## 2024-10-23 RX ORDER — PANTOPRAZOLE SODIUM 40 MG/1
40 TABLET, DELAYED RELEASE ORAL
COMMUNITY
Start: 2024-09-29 | End: 2024-11-29

## 2024-10-23 RX ORDER — NICOTINE 21 MG/24HR
1 PATCH, TRANSDERMAL 24 HOURS TRANSDERMAL
COMMUNITY
Start: 2024-10-06 | End: 2024-10-23

## 2024-10-23 ASSESSMENT — PATIENT HEALTH QUESTIONNAIRE - PHQ9
10. IF YOU CHECKED OFF ANY PROBLEMS, HOW DIFFICULT HAVE THESE PROBLEMS MADE IT FOR YOU TO DO YOUR WORK, TAKE CARE OF THINGS AT HOME, OR GET ALONG WITH OTHER PEOPLE: NOT DIFFICULT AT ALL
SUM OF ALL RESPONSES TO PHQ QUESTIONS 1-9: 9
SUM OF ALL RESPONSES TO PHQ QUESTIONS 1-9: 9

## 2024-10-23 ASSESSMENT — PAIN SCALES - GENERAL: PAINLEVEL_OUTOF10: NO PAIN (0)

## 2024-10-23 NOTE — PROGRESS NOTES
Assessment & Plan     Alcohol dependence in remission (H)  At goal     PND (paroxysmal nocturnal dyspnea)  New   - Adult Cardiology Eval  Referral; Future    Acute reaction to stress  Helping   - sertraline (ZOLOFT) 50 MG tablet; Take 1.5 tablets (75 mg) by mouth daily.    CKD stage G3a/A2, GFR 45-59 and albumin creatinine ratio  mg/g (H)  Worse, stable now    Cigarette nicotine dependence without complication  At goal   - nicotine (NICODERM CQ) 21 MG/24HR 24 hr patch; Place 1 patch onto the skin every 24 hours.    Low serum vitamin B12  due  - Vitamin B12; Future    Need for vaccination  due    Continuous opioid dependence (H)  Suboxone, at goal     MED REC REQUIRED  Post Medication Reconciliation Status: discharge medications reconciled, continue medications without change  Nicotine/Tobacco Cessation  She reports that she has been smoking cigarettes. She has a 12.5 pack-year smoking history. She has never used smokeless tobacco.  Nicotine/Tobacco Cessation Plan  Pharmacotherapies : Nicotine patch        Patient Instructions   Refill nicoderm, sertraline    Will get a call from cards to schedule appointment for followup and help with waking up middle of night with shortness of breath     Covid and B12 today          Subjective   Josette is a 69 year old, presenting for the following health issues:  Hospital F/U      10/23/2024     2:51 PM   Additional Questions   Roomed by Nilsa   Accompanied by Self     HPI     Hospital Follow-up Visit:    Hospital/Nursing Home/IP Rehab Facility:  [facility]  Date of Admission: 10/02/2024  Date of Discharge: 10/05/2024  Reason(s) for Admission: Fluid overload  Was the patient in the ICU or did the patient experience delirium during hospitalization?  No  Do you have any other stressors you would like to discuss with your provider? Health Concerns Breathing issues    Problems taking medications regularly:  None  Medication changes since discharge: None  Problems  "adhering to non-medication therapy:  NO    Summary of hospitalization:  CareEverywhere information obtained and reviewed  Diagnostic Tests/Treatments reviewed.  Follow up needed: none  Other Healthcare Providers Involved in Patient s Care:         None  Update since discharge: fluctuating course.         Plan of care communicated with patient and family            waking up middle of night with shortness of breath, new    No further drinking; no AA    Needs patches to help avoid smoking    Suboxone helping, no other use    CKD- followup with nephrology      Review of Systems  Constitutional, HEENT, cardiovascular, pulmonary, gi and gu systems are negative, except as otherwise noted.      Objective    BP (!) 150/60 (BP Location: Right arm)   Pulse 81   Temp 98.6  F (37  C) (Temporal)   Resp 16   Ht 1.676 m (5' 6\")   Wt 54.7 kg (120 lb 8 oz)   LMP  (LMP Unknown)   SpO2 98%   BMI 19.45 kg/m    Body mass index is 19.45 kg/m .  Physical Exam   GENERAL: no distress, frail, elderly, and appears older than stated age  NECK: no adenopathy, no asymmetry, masses, or scars  RESP: lungs clear to auscultation - no rales, rhonchi or wheezes  CV: regular rate and rhythm, normal S1 S2, no S3 or S4, no murmur, click or rub, no peripheral edema  ABDOMEN: soft, nontender, no hepatosplenomegaly, no masses and bowel sounds normal  MS: no gross musculoskeletal defects noted, no edema        Signed Electronically by: Ishaan West MD    Answers submitted by the patient for this visit:  Patient Health Questionnaire (Submitted on 10/23/2024)  If you checked off any problems, how difficult have these problems made it for you to do your work, take care of things at home, or get along with other people?: Not difficult at all  PHQ9 TOTAL SCORE: 9    "

## 2024-10-25 DIAGNOSIS — Z79.899 ENCOUNTER FOR MONITORING SUBOXONE MAINTENANCE THERAPY: ICD-10-CM

## 2024-10-25 DIAGNOSIS — Z51.81 ENCOUNTER FOR MONITORING SUBOXONE MAINTENANCE THERAPY: ICD-10-CM

## 2024-10-27 RX ORDER — BUPRENORPHINE HYDROCHLORIDE AND NALOXONE HYDROCHLORIDE DIHYDRATE 2; .5 MG/1; MG/1
1 TABLET SUBLINGUAL 2 TIMES DAILY
Qty: 60 TABLET | Refills: 0 | Status: SHIPPED | OUTPATIENT
Start: 2024-10-27

## 2024-10-31 ENCOUNTER — TRANSFERRED RECORDS (OUTPATIENT)
Dept: HEALTH INFORMATION MANAGEMENT | Facility: CLINIC | Age: 69
End: 2024-10-31
Payer: MEDICARE

## 2024-10-31 NOTE — PATIENT INSTRUCTIONS
Refill nicoderm, sertraline    Will get a call from cards to schedule appointment for followup and help with waking up middle of night with shortness of breath     Covid and B12 today

## 2024-11-06 ENCOUNTER — LAB (OUTPATIENT)
Dept: LAB | Facility: CLINIC | Age: 69
End: 2024-11-06
Payer: MEDICARE

## 2024-11-06 ENCOUNTER — TELEPHONE (OUTPATIENT)
Dept: FAMILY MEDICINE | Facility: CLINIC | Age: 69
End: 2024-11-06

## 2024-11-06 DIAGNOSIS — E11.9 DIABETES MELLITUS, TYPE 2 (H): Primary | ICD-10-CM

## 2024-11-06 DIAGNOSIS — E53.8 LOW SERUM VITAMIN B12: ICD-10-CM

## 2024-11-06 DIAGNOSIS — D63.1 ANEMIA OF CHRONIC RENAL FAILURE: ICD-10-CM

## 2024-11-06 DIAGNOSIS — R80.1 PERSISTENT PROTEINURIA: ICD-10-CM

## 2024-11-06 DIAGNOSIS — N18.9 ANEMIA OF CHRONIC RENAL FAILURE: ICD-10-CM

## 2024-11-06 DIAGNOSIS — I10 HYPERTENSION: ICD-10-CM

## 2024-11-06 DIAGNOSIS — C34.11 CANCER OF UPPER LOBE OF RIGHT LUNG (H): ICD-10-CM

## 2024-11-06 DIAGNOSIS — C34.11 SMALL CELL CARCINOMA OF UPPER LOBE OF RIGHT LUNG (H): ICD-10-CM

## 2024-11-06 DIAGNOSIS — N18.32 CHRONIC KIDNEY DISEASE (CKD) STAGE G3B/A1, MODERATELY DECREASED GLOMERULAR FILTRATION RATE (GFR) BETWEEN 30-44 ML/MIN/1.73 SQUARE METER AND ALBUMINURIA CREATININE RATIO LESS THAN 30 MG/G (H): ICD-10-CM

## 2024-11-06 DIAGNOSIS — K70.31 ALCOHOLIC CIRRHOSIS OF LIVER WITH ASCITES (H): ICD-10-CM

## 2024-11-06 LAB
ALBUMIN SERPL BCG-MCNC: 3.2 G/DL (ref 3.5–5.2)
ALBUMIN UR-MCNC: 300 MG/DL
ALP SERPL-CCNC: 103 U/L (ref 40–150)
ALT SERPL W P-5'-P-CCNC: 9 U/L (ref 0–50)
ANION GAP SERPL CALCULATED.3IONS-SCNC: 10 MMOL/L (ref 7–15)
APPEARANCE UR: CLEAR
AST SERPL W P-5'-P-CCNC: 21 U/L (ref 0–45)
BASOPHILS # BLD AUTO: 0 10E3/UL (ref 0–0.2)
BASOPHILS NFR BLD AUTO: 1 %
BILIRUB SERPL-MCNC: 0.3 MG/DL
BILIRUB UR QL STRIP: NEGATIVE
BUN SERPL-MCNC: 44.5 MG/DL (ref 8–23)
CALCIUM SERPL-MCNC: 8.6 MG/DL (ref 8.8–10.4)
CHLORIDE SERPL-SCNC: 107 MMOL/L (ref 98–107)
COLOR UR AUTO: ABNORMAL
CREAT SERPL-MCNC: 2.69 MG/DL (ref 0.51–0.95)
CYSTATIN C (ROCHE): 4.3 MG/L (ref 0.6–1)
EGFRCR SERPLBLD CKD-EPI 2021: 19 ML/MIN/1.73M2
EOSINOPHIL # BLD AUTO: 0.1 10E3/UL (ref 0–0.7)
EOSINOPHIL NFR BLD AUTO: 2 %
ERYTHROCYTE [DISTWIDTH] IN BLOOD BY AUTOMATED COUNT: 15.3 % (ref 10–15)
ERYTHROCYTE [SEDIMENTATION RATE] IN BLOOD BY WESTERGREN METHOD: 6 MM/HR (ref 0–30)
EST. AVERAGE GLUCOSE BLD GHB EST-MCNC: 117 MG/DL
FERRITIN SERPL-MCNC: 309 NG/ML (ref 11–328)
GFR/BSA.PRED SERPLBLD CYS-BASED-ARV: 10 ML/MIN/1.73M2
GLUCOSE SERPL-MCNC: 153 MG/DL (ref 70–99)
GLUCOSE UR STRIP-MCNC: NEGATIVE MG/DL
HAPTOGLOB SERPL-MCNC: 65 MG/DL (ref 30–200)
HBA1C MFR BLD: 5.7 % (ref 0–5.6)
HCO3 SERPL-SCNC: 22 MMOL/L (ref 22–29)
HCT VFR BLD AUTO: 20.5 % (ref 35–47)
HGB BLD-MCNC: 6.5 G/DL (ref 11.7–15.7)
HGB UR QL STRIP: NEGATIVE
IMM GRANULOCYTES # BLD: 0 10E3/UL
IMM GRANULOCYTES NFR BLD: 0 %
INR PPP: 1.14 (ref 0.85–1.15)
IRON SERPL-MCNC: 31 UG/DL (ref 37–145)
KETONES UR STRIP-MCNC: NEGATIVE MG/DL
LEUKOCYTE ESTERASE UR QL STRIP: NEGATIVE
LYMPHOCYTES # BLD AUTO: 0.9 10E3/UL (ref 0.8–5.3)
LYMPHOCYTES NFR BLD AUTO: 18 %
MCH RBC QN AUTO: 29.1 PG (ref 26.5–33)
MCHC RBC AUTO-ENTMCNC: 31.7 G/DL (ref 31.5–36.5)
MCV RBC AUTO: 92 FL (ref 78–100)
MONOCYTES # BLD AUTO: 0.2 10E3/UL (ref 0–1.3)
MONOCYTES NFR BLD AUTO: 5 %
NEUTROPHILS # BLD AUTO: 3.6 10E3/UL (ref 1.6–8.3)
NEUTROPHILS NFR BLD AUTO: 74 %
NITRATE UR QL: NEGATIVE
NRBC # BLD AUTO: 0 10E3/UL
NRBC BLD AUTO-RTO: 0 /100
PH UR STRIP: 6 [PH] (ref 5–7)
PHOSPHATE SERPL-MCNC: 5.3 MG/DL (ref 2.5–4.5)
PLATELET # BLD AUTO: 114 10E3/UL (ref 150–450)
POTASSIUM SERPL-SCNC: 5 MMOL/L (ref 3.4–5.3)
PROT SERPL-MCNC: 6.9 G/DL (ref 6.4–8.3)
RBC # BLD AUTO: 2.23 10E6/UL (ref 3.8–5.2)
RBC #/AREA URNS AUTO: ABNORMAL /HPF
RETICS # AUTO: 0.06 10E6/UL
RETICS/RBC NFR AUTO: 2.5 %
SKIP: ABNORMAL
SODIUM SERPL-SCNC: 139 MMOL/L (ref 135–145)
SP GR UR STRIP: 1.01 (ref 1–1.03)
SQUAMOUS #/AREA URNS AUTO: ABNORMAL /LPF
UROBILINOGEN UR STRIP-MCNC: NORMAL MG/DL
VIT B12 SERPL-MCNC: 1075 PG/ML (ref 232–1245)
WBC # BLD AUTO: 4.9 10E3/UL (ref 4–11)
WBC #/AREA URNS AUTO: ABNORMAL /HPF

## 2024-11-06 NOTE — TELEPHONE ENCOUNTER
Authorizing: Az Vasques APRN [facility]    Hgb 6.5    RN attempted to call patient. (To triage) no answer.    11/06/2024 04:42 PM by [facility] calling with Critical Lab for outside provider.  RN advised lab that results need to go to ordering provider.  Lab stated process is to send to primary.    RN attempted to call [facility]:  Offices currently closed and on call provider will need a direct line to call back. (Triage RN does not have direct line).        RN called and spoke with Bea Baum MLT:  Bea will follow correct process and call    Az Vasques APRN [phone]       Dr. Bar was able to call back and RN gave Critical Results.  Dr. Bar will follow up with patient.

## 2024-11-07 LAB
KAPPA LC FREE SER-MCNC: 24.64 MG/DL (ref 0.33–1.94)
KAPPA LC FREE/LAMBDA FREE SER NEPH: 1.58 {RATIO} (ref 0.26–1.65)
LAMBDA LC FREE SERPL-MCNC: 15.56 MG/DL (ref 0.57–2.63)

## 2024-11-08 DIAGNOSIS — N25.81 SECONDARY HYPERPARATHYROIDISM OF RENAL ORIGIN: ICD-10-CM

## 2024-11-08 DIAGNOSIS — N18.9 ANEMIA OF CHRONIC RENAL FAILURE: Primary | ICD-10-CM

## 2024-11-08 DIAGNOSIS — N18.4 CHRONIC KIDNEY DISEASE, STAGE IV (SEVERE) (H): Primary | ICD-10-CM

## 2024-11-08 DIAGNOSIS — D63.1 ANEMIA OF CHRONIC RENAL FAILURE: Primary | ICD-10-CM

## 2024-11-08 DIAGNOSIS — C34.11 PRIMARY NON-SMALL CELL CARCINOMA OF UPPER LOBE OF RIGHT LUNG (H): ICD-10-CM

## 2024-11-08 LAB — EPO SERPL-ACNC: 23 MU/ML

## 2024-11-09 ENCOUNTER — TRANSFERRED RECORDS (OUTPATIENT)
Dept: HEALTH INFORMATION MANAGEMENT | Facility: CLINIC | Age: 69
End: 2024-11-09
Payer: MEDICARE

## 2024-11-09 LAB — STFR SERPL-MCNC: 2.5 MG/L

## 2024-11-10 DIAGNOSIS — Z79.899 ENCOUNTER FOR MONITORING SUBOXONE MAINTENANCE THERAPY: ICD-10-CM

## 2024-11-10 DIAGNOSIS — Z51.81 ENCOUNTER FOR MONITORING SUBOXONE MAINTENANCE THERAPY: ICD-10-CM

## 2024-11-10 LAB
LABORATORY REPORT: NORMAL
PETH INTERPRETATION: NORMAL
PLPETH BLD-MCNC: <10 NG/ML
POPETH BLD-MCNC: <10 NG/ML

## 2024-11-11 RX ORDER — PRAMIPEXOLE DIHYDROCHLORIDE 0.12 MG/1
TABLET ORAL
Qty: 90 TABLET | Refills: 1 | OUTPATIENT
Start: 2024-11-11

## 2024-11-11 NOTE — RESULT ENCOUNTER NOTE
Haroldo Nathan: A1c results is at goal <7; recommend continue diet and exercise, recheck in 6 months.  Vit B12 is within normal limits; recheck in 1 year.   Contact if questions; nice to see you!     Ishaan ELIAS

## 2024-11-25 ENCOUNTER — MYC REFILL (OUTPATIENT)
Dept: FAMILY MEDICINE | Facility: CLINIC | Age: 69
End: 2024-11-25

## 2024-11-25 ENCOUNTER — TRANSCRIBE ORDERS (OUTPATIENT)
Dept: LAB | Facility: CLINIC | Age: 69
End: 2024-11-25

## 2024-11-25 DIAGNOSIS — N18.9 ANEMIA OF CHRONIC RENAL FAILURE: Primary | ICD-10-CM

## 2024-11-25 DIAGNOSIS — C34.11 CANCER OF UPPER LOBE OF RIGHT LUNG (H): ICD-10-CM

## 2024-11-25 DIAGNOSIS — Z79.899 ENCOUNTER FOR MONITORING SUBOXONE MAINTENANCE THERAPY: ICD-10-CM

## 2024-11-25 DIAGNOSIS — Z51.81 ENCOUNTER FOR MONITORING SUBOXONE MAINTENANCE THERAPY: ICD-10-CM

## 2024-11-25 DIAGNOSIS — D63.1 ANEMIA OF CHRONIC RENAL FAILURE: Primary | ICD-10-CM

## 2024-11-26 ENCOUNTER — LAB (OUTPATIENT)
Dept: LAB | Facility: CLINIC | Age: 69
End: 2024-11-26
Payer: MEDICARE

## 2024-11-26 DIAGNOSIS — D63.1 ANEMIA OF CHRONIC RENAL FAILURE: ICD-10-CM

## 2024-11-26 DIAGNOSIS — N18.4 CHRONIC KIDNEY DISEASE, STAGE IV (SEVERE) (H): Primary | ICD-10-CM

## 2024-11-26 DIAGNOSIS — C34.11 PRIMARY NON-SMALL CELL CARCINOMA OF UPPER LOBE OF RIGHT LUNG (H): ICD-10-CM

## 2024-11-26 DIAGNOSIS — N18.9 ANEMIA OF CHRONIC RENAL FAILURE: ICD-10-CM

## 2024-11-26 DIAGNOSIS — N25.81 SECONDARY HYPERPARATHYROIDISM OF RENAL ORIGIN (H): ICD-10-CM

## 2024-11-26 DIAGNOSIS — C34.11 CANCER OF UPPER LOBE OF RIGHT LUNG (H): ICD-10-CM

## 2024-11-26 DIAGNOSIS — N18.4 CHRONIC KIDNEY DISEASE, STAGE IV (SEVERE) (H): ICD-10-CM

## 2024-11-26 LAB
ALBUMIN SERPL BCG-MCNC: 3.3 G/DL (ref 3.5–5.2)
ALP SERPL-CCNC: 117 U/L (ref 40–150)
ALT SERPL W P-5'-P-CCNC: 10 U/L (ref 0–50)
ANION GAP SERPL CALCULATED.3IONS-SCNC: 15 MMOL/L (ref 7–15)
AST SERPL W P-5'-P-CCNC: 21 U/L (ref 0–45)
BASOPHILS # BLD AUTO: 0 10E3/UL (ref 0–0.2)
BASOPHILS NFR BLD AUTO: 0 %
BILIRUB SERPL-MCNC: 0.4 MG/DL
BUN SERPL-MCNC: 56.2 MG/DL (ref 8–23)
CALCIUM SERPL-MCNC: 8.6 MG/DL (ref 8.8–10.4)
CHLORIDE SERPL-SCNC: 106 MMOL/L (ref 98–107)
CREAT SERPL-MCNC: 3.37 MG/DL (ref 0.51–0.95)
EGFRCR SERPLBLD CKD-EPI 2021: 14 ML/MIN/1.73M2
EOSINOPHIL # BLD AUTO: 0.1 10E3/UL (ref 0–0.7)
EOSINOPHIL NFR BLD AUTO: 3 %
ERYTHROCYTE [DISTWIDTH] IN BLOOD BY AUTOMATED COUNT: 14.5 % (ref 10–15)
GLUCOSE SERPL-MCNC: 117 MG/DL (ref 70–99)
HCO3 SERPL-SCNC: 19 MMOL/L (ref 22–29)
HCT VFR BLD AUTO: 24 % (ref 35–47)
HGB BLD-MCNC: 7.5 G/DL (ref 11.7–15.7)
IMM GRANULOCYTES # BLD: 0 10E3/UL
IMM GRANULOCYTES NFR BLD: 1 %
LYMPHOCYTES # BLD AUTO: 0.7 10E3/UL (ref 0.8–5.3)
LYMPHOCYTES NFR BLD AUTO: 14 %
MCH RBC QN AUTO: 28.4 PG (ref 26.5–33)
MCHC RBC AUTO-ENTMCNC: 31.3 G/DL (ref 31.5–36.5)
MCV RBC AUTO: 91 FL (ref 78–100)
MONOCYTES # BLD AUTO: 0.4 10E3/UL (ref 0–1.3)
MONOCYTES NFR BLD AUTO: 8 %
NEUTROPHILS # BLD AUTO: 3.5 10E3/UL (ref 1.6–8.3)
NEUTROPHILS NFR BLD AUTO: 74 %
NRBC # BLD AUTO: 0 10E3/UL
NRBC BLD AUTO-RTO: 0 /100
PHOSPHATE SERPL-MCNC: 5.3 MG/DL (ref 2.5–4.5)
PLATELET # BLD AUTO: 136 10E3/UL (ref 150–450)
POTASSIUM SERPL-SCNC: 4.9 MMOL/L (ref 3.4–5.3)
PROT SERPL-MCNC: 7 G/DL (ref 6.4–8.3)
PTH-INTACT SERPL-MCNC: 146 PG/ML (ref 15–65)
RBC # BLD AUTO: 2.64 10E6/UL (ref 3.8–5.2)
SODIUM SERPL-SCNC: 140 MMOL/L (ref 135–145)
WBC # BLD AUTO: 4.8 10E3/UL (ref 4–11)

## 2024-11-26 PROCEDURE — 85025 COMPLETE CBC W/AUTO DIFF WBC: CPT

## 2024-11-26 PROCEDURE — 83970 ASSAY OF PARATHORMONE: CPT

## 2024-11-26 PROCEDURE — 36415 COLL VENOUS BLD VENIPUNCTURE: CPT

## 2024-11-26 PROCEDURE — 80053 COMPREHEN METABOLIC PANEL: CPT

## 2024-11-26 PROCEDURE — 84100 ASSAY OF PHOSPHORUS: CPT

## 2024-11-26 RX ORDER — PRAMIPEXOLE DIHYDROCHLORIDE 0.12 MG/1
TABLET ORAL
Qty: 90 TABLET | Refills: 1 | Status: SHIPPED | OUTPATIENT
Start: 2024-11-26

## 2024-11-29 ENCOUNTER — TRANSFERRED RECORDS (OUTPATIENT)
Dept: HEALTH INFORMATION MANAGEMENT | Facility: CLINIC | Age: 69
End: 2024-11-29
Payer: MEDICARE

## 2024-11-29 ENCOUNTER — MEDICAL CORRESPONDENCE (OUTPATIENT)
Dept: HEALTH INFORMATION MANAGEMENT | Facility: CLINIC | Age: 69
End: 2024-11-29

## 2024-12-06 ENCOUNTER — TELEPHONE (OUTPATIENT)
Dept: FAMILY MEDICINE | Facility: CLINIC | Age: 69
End: 2024-12-06

## 2024-12-09 ENCOUNTER — TELEPHONE (OUTPATIENT)
Dept: FAMILY MEDICINE | Facility: CLINIC | Age: 69
End: 2024-12-09
Payer: MEDICARE

## 2024-12-09 DIAGNOSIS — N18.4 CHRONIC KIDNEY DISEASE, STAGE IV (SEVERE) (H): Primary | ICD-10-CM

## 2024-12-09 NOTE — TELEPHONE ENCOUNTER
Spoke with RN for home care okayed orders ,  Thanks, '  Cedrick Brewster RN  [facility] '

## 2024-12-09 NOTE — TELEPHONE ENCOUNTER
[facility] calling. Requesting call back for verbal confirmation on whether or not Dr. West will be giving the orders for the ongoing home care services.     [phone] option 1 for intake. Secure line if you want to leave a message.    Petra Lambert RN  [facility]   
Left detailed message on secure home care line with provider message  Yes, I will provide homecare orders, and follow.  Please notify, thanks Ishaan Live RN   [facility]      
[Negative] : Heme/Lymph

## 2024-12-09 NOTE — TELEPHONE ENCOUNTER
Nurse from [facility] :     Starting for home care 1x4 for weeks   Cardiopulmonary , Gen wellness, and fall prevention.   2 prn For status changes/ agency discharge.     [phone]   Michelle STOREY     Thanks,   Cedrick Brewster RN  [facility]

## 2024-12-11 ENCOUNTER — TELEPHONE (OUTPATIENT)
Dept: FAMILY MEDICINE | Facility: CLINIC | Age: 69
End: 2024-12-11

## 2024-12-11 NOTE — TELEPHONE ENCOUNTER
Requesting PT 1 time a week for 3 weeks- effective 12/15    This is for Strengthening,  balance,  gait training, and Fall prevention.     Please call PT back at [phone]    Casandra Miller RN  [place]

## 2024-12-12 NOTE — TELEPHONE ENCOUNTER
Called home care and relayed approval of home orders. Thanks    Casandra Miller RN  [facility]

## 2024-12-17 ENCOUNTER — TELEPHONE (OUTPATIENT)
Dept: FAMILY MEDICINE | Facility: CLINIC | Age: 69
End: 2024-12-17

## 2024-12-17 ENCOUNTER — LAB (OUTPATIENT)
Dept: LAB | Facility: CLINIC | Age: 69
End: 2024-12-17
Payer: MEDICARE

## 2024-12-17 ENCOUNTER — TRANSFERRED RECORDS (OUTPATIENT)
Dept: HEALTH INFORMATION MANAGEMENT | Facility: CLINIC | Age: 69
End: 2024-12-17

## 2024-12-17 DIAGNOSIS — N18.4 CHRONIC KIDNEY DISEASE, STAGE IV (SEVERE) (H): ICD-10-CM

## 2024-12-17 DIAGNOSIS — N25.81 SECONDARY HYPERPARATHYROIDISM OF RENAL ORIGIN (H): ICD-10-CM

## 2024-12-17 DIAGNOSIS — D63.1 ANEMIA OF CHRONIC RENAL FAILURE: Primary | ICD-10-CM

## 2024-12-17 DIAGNOSIS — C34.11 CANCER OF UPPER LOBE OF RIGHT LUNG (H): ICD-10-CM

## 2024-12-17 DIAGNOSIS — N18.9 ANEMIA OF CHRONIC RENAL FAILURE: Primary | ICD-10-CM

## 2024-12-17 DIAGNOSIS — D50.0 IRON DEFICIENCY ANEMIA SECONDARY TO BLOOD LOSS (CHRONIC): ICD-10-CM

## 2024-12-17 LAB
ALBUMIN SERPL BCG-MCNC: 3.8 G/DL (ref 3.5–5.2)
ANION GAP SERPL CALCULATED.3IONS-SCNC: 13 MMOL/L (ref 7–15)
BASOPHILS # BLD AUTO: 0 10E3/UL (ref 0–0.2)
BASOPHILS NFR BLD AUTO: 0 %
BUN SERPL-MCNC: 75.3 MG/DL (ref 8–23)
CALCIUM SERPL-MCNC: 8.8 MG/DL (ref 8.8–10.4)
CHLORIDE SERPL-SCNC: 105 MMOL/L (ref 98–107)
CREAT SERPL-MCNC: 4.03 MG/DL (ref 0.51–0.95)
EGFRCR SERPLBLD CKD-EPI 2021: 11 ML/MIN/1.73M2
EOSINOPHIL # BLD AUTO: 0.1 10E3/UL (ref 0–0.7)
EOSINOPHIL NFR BLD AUTO: 3 %
ERYTHROCYTE [DISTWIDTH] IN BLOOD BY AUTOMATED COUNT: 16.6 % (ref 10–15)
GLUCOSE SERPL-MCNC: 220 MG/DL (ref 70–99)
HCO3 SERPL-SCNC: 22 MMOL/L (ref 22–29)
HCT VFR BLD AUTO: 26.3 % (ref 35–47)
HGB BLD-MCNC: 8.6 G/DL (ref 11.7–15.7)
HOLD SPECIMEN: NORMAL
IMM GRANULOCYTES # BLD: 0 10E3/UL
IMM GRANULOCYTES NFR BLD: 0 %
LYMPHOCYTES # BLD AUTO: 0.7 10E3/UL (ref 0.8–5.3)
LYMPHOCYTES NFR BLD AUTO: 15 %
MCH RBC QN AUTO: 28.9 PG (ref 26.5–33)
MCHC RBC AUTO-ENTMCNC: 32.7 G/DL (ref 31.5–36.5)
MCV RBC AUTO: 88 FL (ref 78–100)
MONOCYTES # BLD AUTO: 0.3 10E3/UL (ref 0–1.3)
MONOCYTES NFR BLD AUTO: 6 %
NEUTROPHILS # BLD AUTO: 3.7 10E3/UL (ref 1.6–8.3)
NEUTROPHILS NFR BLD AUTO: 76 %
NRBC # BLD AUTO: 0 10E3/UL
NRBC BLD AUTO-RTO: 0 /100
PHOSPHATE SERPL-MCNC: 6.5 MG/DL (ref 2.5–4.5)
PLATELET # BLD AUTO: 138 10E3/UL (ref 150–450)
POTASSIUM SERPL-SCNC: 5.4 MMOL/L (ref 3.4–5.3)
RBC # BLD AUTO: 2.98 10E6/UL (ref 3.8–5.2)
SODIUM SERPL-SCNC: 140 MMOL/L (ref 135–145)
WBC # BLD AUTO: 4.9 10E3/UL (ref 4–11)

## 2024-12-17 PROCEDURE — 80069 RENAL FUNCTION PANEL: CPT

## 2024-12-17 PROCEDURE — 36415 COLL VENOUS BLD VENIPUNCTURE: CPT

## 2024-12-17 PROCEDURE — 85025 COMPLETE CBC W/AUTO DIFF WBC: CPT

## 2024-12-17 PROCEDURE — 83970 ASSAY OF PARATHORMONE: CPT

## 2024-12-17 NOTE — TELEPHONE ENCOUNTER
[facility] home care calling for verbal orders:    OT 1 time a week for 2 weeks. For ADL's and cognitive testing and caregiver training.       Please call home care back at [phone]. Thanks    Casandra Miller RN  [facility]

## 2024-12-18 ENCOUNTER — MYC MEDICAL ADVICE (OUTPATIENT)
Dept: FAMILY MEDICINE | Facility: CLINIC | Age: 69
End: 2024-12-18
Payer: MEDICARE

## 2024-12-18 DIAGNOSIS — I10 HYPERTENSION, BENIGN ESSENTIAL, GOAL BELOW 140/90: ICD-10-CM

## 2024-12-18 DIAGNOSIS — F10.21 ALCOHOL DEPENDENCE IN REMISSION (H): Primary | ICD-10-CM

## 2024-12-18 DIAGNOSIS — I10 ELEVATED BLOOD PRESSURE READING WITH DIAGNOSIS OF HYPERTENSION: ICD-10-CM

## 2024-12-18 LAB — PTH-INTACT SERPL-MCNC: 214 PG/ML (ref 15–65)

## 2024-12-18 RX ORDER — LANOLIN ALCOHOL/MO/W.PET/CERES
100 CREAM (GRAM) TOPICAL DAILY
Qty: 100 TABLET | Refills: 3 | Status: SHIPPED | OUTPATIENT
Start: 2024-12-18

## 2024-12-18 RX ORDER — METOPROLOL SUCCINATE 25 MG/1
25 TABLET, EXTENDED RELEASE ORAL DAILY
Qty: 90 TABLET | Refills: 3 | Status: SHIPPED | OUTPATIENT
Start: 2024-12-18

## 2024-12-18 RX ORDER — LANOLIN ALCOHOL/MO/W.PET/CERES
100 CREAM (GRAM) TOPICAL DAILY
COMMUNITY
Start: 2024-11-14 | End: 2024-12-18

## 2024-12-18 RX ORDER — METOPROLOL SUCCINATE 25 MG/1
25 TABLET, EXTENDED RELEASE ORAL DAILY
COMMUNITY
Start: 2024-11-15 | End: 2024-12-18

## 2024-12-20 DIAGNOSIS — Z53.9 DIAGNOSIS NOT YET DEFINED: Primary | ICD-10-CM

## 2024-12-20 PROCEDURE — G0180 MD CERTIFICATION HHA PATIENT: HCPCS | Performed by: FAMILY MEDICINE

## 2024-12-23 ENCOUNTER — MYC MEDICAL ADVICE (OUTPATIENT)
Dept: FAMILY MEDICINE | Facility: CLINIC | Age: 69
End: 2024-12-23
Payer: MEDICARE

## 2024-12-23 DIAGNOSIS — I50.33 ACUTE ON CHRONIC HEART FAILURE WITH PRESERVED EJECTION FRACTION (H): Primary | ICD-10-CM

## 2024-12-23 DIAGNOSIS — N18.31 CKD STAGE G3A/A2, GFR 45-59 AND ALBUMIN CREATININE RATIO 30-299 MG/G (H): Primary | ICD-10-CM

## 2024-12-23 RX ORDER — ATORVASTATIN CALCIUM 20 MG/1
20 TABLET, FILM COATED ORAL DAILY
COMMUNITY
Start: 2024-12-23

## 2024-12-23 RX ORDER — TORSEMIDE 20 MG/1
40 TABLET ORAL DAILY
Qty: 180 TABLET | Refills: 3 | COMMUNITY
Start: 2024-12-23

## 2024-12-23 RX ORDER — CALCIUM ACETATE 667 MG/1
TABLET ORAL 3 TIMES DAILY
COMMUNITY
Start: 2024-12-23

## 2024-12-23 RX ORDER — PANTOPRAZOLE SODIUM 20 MG/1
40 TABLET, DELAYED RELEASE ORAL DAILY
COMMUNITY
Start: 2024-12-23

## 2024-12-23 NOTE — CONFIDENTIAL NOTE
Read and agree with plan   Please ask them to schedule followup appointment to see me in Feb. Thanks Ishaan

## 2024-12-24 PROBLEM — I50.33 ACUTE ON CHRONIC HEART FAILURE WITH PRESERVED EJECTION FRACTION (H): Status: ACTIVE | Noted: 2024-12-24

## 2024-12-24 RX ORDER — TORSEMIDE 20 MG/1
40 TABLET ORAL DAILY
Qty: 60 TABLET | Refills: 1 | Status: SHIPPED | OUTPATIENT
Start: 2024-12-24

## 2024-12-24 NOTE — TELEPHONE ENCOUNTER
Per pt mychart requesting refill of torsemide, current rx is marked as historical.    Thanks!  Andrés LOVE RN   [facility]

## 2024-12-25 DIAGNOSIS — Z51.81 ENCOUNTER FOR MONITORING SUBOXONE MAINTENANCE THERAPY: ICD-10-CM

## 2024-12-25 DIAGNOSIS — Z79.899 ENCOUNTER FOR MONITORING SUBOXONE MAINTENANCE THERAPY: ICD-10-CM

## 2024-12-26 RX ORDER — BUPRENORPHINE HYDROCHLORIDE AND NALOXONE HYDROCHLORIDE DIHYDRATE 2; .5 MG/1; MG/1
1 TABLET SUBLINGUAL DAILY
Qty: 60 TABLET | Refills: 0 | Status: SHIPPED | OUTPATIENT
Start: 2024-12-26

## 2024-12-27 ENCOUNTER — MEDICAL CORRESPONDENCE (OUTPATIENT)
Dept: HEALTH INFORMATION MANAGEMENT | Facility: CLINIC | Age: 69
End: 2024-12-27
Payer: MEDICARE

## 2025-01-03 ENCOUNTER — TELEPHONE (OUTPATIENT)
Dept: NURSING | Facility: CLINIC | Age: 70
End: 2025-01-03
Payer: MEDICARE

## 2025-01-03 NOTE — TELEPHONE ENCOUNTER
Home Care is calling regarding an established patient with [facility].       Requesting orders from: Ishaan West  Provider is following patient: Yes  Is this a 60-day recertification request?  No    Orders Requested    Physical Therapy  Request for delay in care, service is not able to be provided within same scheduled day.     Physical therapist asking for order to reschedule from this weeks appt to the following week for discharge visit.  Physical therapist Fátima [phone].         Information was gathered and will be sent to provider for review.  RN will contact Home Care with information after provider review.  Confirmed ok to leave a detailed message with call back.  Contact information confirmed and updated as needed.    Marlene España RN

## 2025-01-10 ENCOUNTER — TELEPHONE (OUTPATIENT)
Dept: FAMILY MEDICINE | Facility: CLINIC | Age: 70
End: 2025-01-10
Payer: MEDICARE

## 2025-01-10 NOTE — TELEPHONE ENCOUNTER
Reason for Call (Form)     Goal: Our goal is to have forms completed within 72 hours. However, some forms may require a visit or additional information.     Type of Letter, Form, or Note: forms      Form Origin: [facility]     Received From: [facility]      Placed At: [facility]: Dr. West's Bin      Provider: Ishaan West     Additional Comments:  [facility]   P:[phone]  F:[phone]

## 2025-01-13 ENCOUNTER — MEDICAL CORRESPONDENCE (OUTPATIENT)
Dept: HEALTH INFORMATION MANAGEMENT | Facility: CLINIC | Age: 70
End: 2025-01-13
Payer: MEDICARE

## 2025-01-24 ENCOUNTER — OFFICE VISIT (OUTPATIENT)
Dept: FAMILY MEDICINE | Facility: CLINIC | Age: 70
End: 2025-01-24
Payer: MEDICARE

## 2025-01-24 VITALS
RESPIRATION RATE: 20 BRPM | DIASTOLIC BLOOD PRESSURE: 64 MMHG | HEART RATE: 97 BPM | BODY MASS INDEX: 17.84 KG/M2 | OXYGEN SATURATION: 97 % | HEIGHT: 66 IN | TEMPERATURE: 97.9 F | WEIGHT: 111 LBS | SYSTOLIC BLOOD PRESSURE: 126 MMHG

## 2025-01-24 DIAGNOSIS — F17.210 CIGARETTE NICOTINE DEPENDENCE WITHOUT COMPLICATION: ICD-10-CM

## 2025-01-24 DIAGNOSIS — Z79.899 ENCOUNTER FOR MONITORING SUBOXONE MAINTENANCE THERAPY: ICD-10-CM

## 2025-01-24 DIAGNOSIS — N18.4 CHRONIC KIDNEY DISEASE, STAGE IV (SEVERE) (H): Primary | ICD-10-CM

## 2025-01-24 DIAGNOSIS — K70.31 ALCOHOLIC CIRRHOSIS OF LIVER WITH ASCITES (H): ICD-10-CM

## 2025-01-24 DIAGNOSIS — Z51.81 ENCOUNTER FOR MONITORING SUBOXONE MAINTENANCE THERAPY: ICD-10-CM

## 2025-01-24 PROBLEM — N18.31 CKD STAGE G3A/A2, GFR 45-59 AND ALBUMIN CREATININE RATIO 30-299 MG/G (H): Status: RESOLVED | Noted: 2019-06-19 | Resolved: 2025-01-24

## 2025-01-24 LAB
ALBUMIN SERPL BCG-MCNC: 3.6 G/DL (ref 3.5–5.2)
ANION GAP SERPL CALCULATED.3IONS-SCNC: 14 MMOL/L (ref 7–15)
ANION GAP SERPL CALCULATED.3IONS-SCNC: 14 MMOL/L (ref 7–15)
BUN SERPL-MCNC: 70.3 MG/DL (ref 8–23)
BUN SERPL-MCNC: 79.9 MG/DL (ref 8–23)
CALCIUM SERPL-MCNC: 8.4 MG/DL (ref 8.8–10.4)
CALCIUM SERPL-MCNC: 8.9 MG/DL (ref 8.8–10.4)
CHLORIDE SERPL-SCNC: 100 MMOL/L (ref 98–107)
CHLORIDE SERPL-SCNC: 99 MMOL/L (ref 98–107)
CREAT SERPL-MCNC: 3.84 MG/DL (ref 0.51–0.95)
CREAT SERPL-MCNC: 3.85 MG/DL (ref 0.51–0.95)
EGFRCR SERPLBLD CKD-EPI 2021: 12 ML/MIN/1.73M2
EGFRCR SERPLBLD CKD-EPI 2021: 12 ML/MIN/1.73M2
ERYTHROCYTE [DISTWIDTH] IN BLOOD BY AUTOMATED COUNT: 16.3 % (ref 10–15)
GLUCOSE SERPL-MCNC: 134 MG/DL (ref 70–99)
GLUCOSE SERPL-MCNC: 136 MG/DL (ref 70–99)
HCO3 SERPL-SCNC: 22 MMOL/L (ref 22–29)
HCO3 SERPL-SCNC: 22 MMOL/L (ref 22–29)
HCT VFR BLD AUTO: 28.2 % (ref 35–47)
HGB BLD-MCNC: 9.2 G/DL (ref 11.7–15.7)
MCH RBC QN AUTO: 28.6 PG (ref 26.5–33)
MCHC RBC AUTO-ENTMCNC: 32.6 G/DL (ref 31.5–36.5)
MCV RBC AUTO: 88 FL (ref 78–100)
PHOSPHATE SERPL-MCNC: 7.3 MG/DL (ref 2.5–4.5)
PLATELET # BLD AUTO: 109 10E3/UL (ref 150–450)
POTASSIUM SERPL-SCNC: 5.4 MMOL/L (ref 3.4–5.3)
POTASSIUM SERPL-SCNC: 5.7 MMOL/L (ref 3.4–5.3)
RBC # BLD AUTO: 3.22 10E6/UL (ref 3.8–5.2)
SODIUM SERPL-SCNC: 135 MMOL/L (ref 135–145)
SODIUM SERPL-SCNC: 136 MMOL/L (ref 135–145)
WBC # BLD AUTO: 6.2 10E3/UL (ref 4–11)

## 2025-01-24 PROCEDURE — 99214 OFFICE O/P EST MOD 30 MIN: CPT | Performed by: FAMILY MEDICINE

## 2025-01-24 PROCEDURE — 85027 COMPLETE CBC AUTOMATED: CPT | Performed by: FAMILY MEDICINE

## 2025-01-24 PROCEDURE — 80069 RENAL FUNCTION PANEL: CPT | Performed by: FAMILY MEDICINE

## 2025-01-24 PROCEDURE — 36415 COLL VENOUS BLD VENIPUNCTURE: CPT | Performed by: FAMILY MEDICINE

## 2025-01-24 RX ORDER — TORSEMIDE 20 MG/1
20 TABLET ORAL DAILY
Qty: 90 TABLET | Refills: 3 | COMMUNITY
Start: 2025-01-24

## 2025-01-24 RX ORDER — NICOTINE 21 MG/24HR
1 PATCH, TRANSDERMAL 24 HOURS TRANSDERMAL EVERY 24 HOURS
Qty: 30 PATCH | Refills: 5 | Status: SHIPPED | OUTPATIENT
Start: 2025-01-24

## 2025-01-24 ASSESSMENT — PATIENT HEALTH QUESTIONNAIRE - PHQ9
SUM OF ALL RESPONSES TO PHQ QUESTIONS 1-9: 11
10. IF YOU CHECKED OFF ANY PROBLEMS, HOW DIFFICULT HAVE THESE PROBLEMS MADE IT FOR YOU TO DO YOUR WORK, TAKE CARE OF THINGS AT HOME, OR GET ALONG WITH OTHER PEOPLE: NOT DIFFICULT AT ALL
SUM OF ALL RESPONSES TO PHQ QUESTIONS 1-9: 11

## 2025-01-24 NOTE — PROGRESS NOTES
Assessment & Plan     Chronic kidney disease, stage IV (severe) (H)  End stage  - Renal panel    Cigarette nicotine dependence without complication  Not at goal   - nicotine (NICODERM CQ) 21 MG/24HR 24 hr patch; Place 1 patch onto the skin every 24 hours.    Alcoholic cirrhosis of liver with ascites (H)  Not at goal     Encounter for monitoring Suboxone maintenance therapy  At goal     Patient Instructions   -let providers know if your decision remains no fistula surgery to be scheduled and no dyalysis    -schedule with nutritionist for renal diet    -schedule with palliative care to discuss goals of what treatments, medications to continue, or not.    -virtual with me in 1-2 mos    Subjective   Josette is a 69 year old, presenting for the following health issues:    Follow Up (Medication, blood work)      1/24/2025     1:02 PM   Additional Questions   Roomed by Elizabeth   Accompanied by Carlos-Spouse     History of Present Illness       Reason for visit:  Med    She eats 2-3 servings of fruits and vegetables daily.She consumes 0 sweetened beverage(s) daily.She exercises with enough effort to increase her heart rate 9 or less minutes per day.  She exercises with enough effort to increase her heart rate 3 or less days per week. She is missing 1 dose(s) of medications per week.  She is not taking prescribed medications regularly due to remembering to take.     Patient opts quality, not quantity, for life remaining. Understand that without a matured fistula, she won't be entering into a dialysis program. She is clear that she does not want to dialyze.     May need PA for nicotine patch 21, helped in hospital    Not drinking      Review of Systems  Constitutional, HEENT, cardiovascular, pulmonary, gi and gu systems are negative, except as otherwise noted.      Objective    /64 (BP Location: Left arm, Patient Position: Sitting, Cuff Size: Adult Small)   Pulse 97   Temp 97.9  F (36.6  C) (Temporal)   Resp 20   Ht  "1.676 m (5' 6\")   Wt 50.3 kg (111 lb)   LMP  (LMP Unknown)   SpO2 97%   BMI 17.92 kg/m    Body mass index is 17.92 kg/m .  Physical Exam   GENERAL: no distress, frail, elderly, and chronically ill appearance  RESP: lungs clear to auscultation - no rales, rhonchi or wheezes  CV: regular rate and rhythm, normal S1 S2, no S3 or S4, no murmur, click or rub, no peripheral edema  ABDOMEN: soft distended, ascities  MS: no gross musculoskeletal defects noted, no edema  SKIN: ecchymoses - arms and hands and yellow/sallow  NEURO: weakness of legs, using walker and abnormal mental status - mildly forgetful, still competent  PSYCH: concentration poor, anxious, fatigued, judgement and insight intact, and sad, not suicidal        Signed Electronically by: Ishaan West MD    "

## 2025-01-26 NOTE — RESULT ENCOUNTER NOTE
Haroldo Lau: Venita's potassium is a little up, kidney failure a little better, calcium down a bit more, hemoglobin up, platelets down (all due to poor kidney function)  Drinking an glass of water may help high potassium, while awaiting info from nutrition appointment.  No treatment for hgb right now- recheck it in a month.  Please contact if questions- talk to you next month    Ishaan ELIAS

## 2025-01-26 NOTE — PATIENT INSTRUCTIONS
-let providers know if your decision remains no fistula surgery to be scheduled and no dyalysis    -schedule with nutritionist for renal diet    -schedule with palliative care to discuss goals of what treatments, medications to continue, or not.    -virtual with me in 1-2 mos   Name of Medication(s) Requested:  Mai swanson    Pharmacy Requested:   [facility]    Medication(s) pended? [x] Yes  [] No    Last Appointment:  Visit date not found    Future appts:  No future appointments. Does patient need call back?   [] Yes  [x] No

## 2025-01-31 ENCOUNTER — TELEPHONE (OUTPATIENT)
Dept: FAMILY MEDICINE | Facility: CLINIC | Age: 70
End: 2025-01-31
Payer: MEDICARE

## 2025-01-31 DIAGNOSIS — Z79.899 ENCOUNTER FOR MONITORING SUBOXONE MAINTENANCE THERAPY: ICD-10-CM

## 2025-01-31 DIAGNOSIS — I50.33 ACUTE ON CHRONIC HEART FAILURE WITH PRESERVED EJECTION FRACTION (H): Primary | ICD-10-CM

## 2025-01-31 DIAGNOSIS — Z51.81 ENCOUNTER FOR MONITORING SUBOXONE MAINTENANCE THERAPY: ICD-10-CM

## 2025-01-31 NOTE — TELEPHONE ENCOUNTER
Pt spouse calling about medications, C2C on file, specifically about torsemide stating they need a new rx sent to pharmacy as last rx was cancelled and it looks like current rx is marked as historical.     Also needs a refill on Suboxone, as pt is taking 2 of them a day even tho bottle says one a day, and only has 10 days worth left with taking 2 a day.    Thanks!  Andrés LOVE RN   [facility]

## 2025-02-03 RX ORDER — BUPRENORPHINE HYDROCHLORIDE AND NALOXONE HYDROCHLORIDE DIHYDRATE 2; .5 MG/1; MG/1
1 TABLET SUBLINGUAL DAILY
Qty: 60 TABLET | Refills: 0 | Status: SHIPPED | OUTPATIENT
Start: 2025-02-03 | End: 2025-02-05

## 2025-02-03 RX ORDER — TORSEMIDE 20 MG/1
20 TABLET ORAL DAILY
Qty: 90 TABLET | Refills: 3 | Status: SHIPPED | OUTPATIENT
Start: 2025-02-03

## 2025-02-17 ENCOUNTER — VIRTUAL VISIT (OUTPATIENT)
Dept: PALLIATIVE CARE | Facility: CLINIC | Age: 70
End: 2025-02-17
Attending: FAMILY MEDICINE
Payer: MEDICARE

## 2025-02-17 VITALS — WEIGHT: 113 LBS | HEIGHT: 66 IN | BODY MASS INDEX: 18.16 KG/M2

## 2025-02-17 DIAGNOSIS — I50.33 ACUTE ON CHRONIC HEART FAILURE WITH PRESERVED EJECTION FRACTION (H): ICD-10-CM

## 2025-02-17 DIAGNOSIS — Z71.89 GOALS OF CARE, COUNSELING/DISCUSSION: ICD-10-CM

## 2025-02-17 DIAGNOSIS — N18.4 CHRONIC KIDNEY DISEASE, STAGE 4 (SEVERE) (H): ICD-10-CM

## 2025-02-17 DIAGNOSIS — K70.31 ALCOHOLIC CIRRHOSIS OF LIVER WITH ASCITES (H): ICD-10-CM

## 2025-02-17 DIAGNOSIS — Z51.5 PALLIATIVE CARE PATIENT: Primary | ICD-10-CM

## 2025-02-17 PROCEDURE — G2211 COMPLEX E/M VISIT ADD ON: HCPCS | Performed by: FAMILY MEDICINE

## 2025-02-17 PROCEDURE — 98003 SYNCH AUDIO-VIDEO NEW HI 60: CPT | Performed by: FAMILY MEDICINE

## 2025-02-17 ASSESSMENT — PAIN SCALES - GENERAL: PAINLEVEL_OUTOF10: NO PAIN (0)

## 2025-02-17 NOTE — PROGRESS NOTES
Virtual Visit Details    Type of service:  Video Visit   Video Start Time: 10:16 AM  Video End Time:11:21 AM    Originating Location (pt. Location): Home    Distant Location (provider location):  On-site  Platform used for Video Visit: [facility]    Palliative Care Outpatient Clinic Consultation Note    Patient:  Josette Palacios    Chief Complaint:   Josette Palacios 70 year old female who is presenting to the palliative medicine clinic today at the request of Ishaan West for a palliative care consultation secondary to ESRD.   The patient's primary care provider is:  Ishaan West.  We were joined by her , Sid.    History of Present Illness:  Josette Palacios is a 70 year old F with PMH significant for ETOH cirrhosis with ascites requiring paracentesis with last one last fall, chronic hep C (untreated), opioid dependency on Suboxone, CKD stage IV referred today for a goals of care conversation. She has chosen to not pursue iHD or creating a fistula.No Alcohol since September 2024    Distressing Symptom/s:  none    Patient's Disease Understanding: very good between Venita and Sid.    Coping:  no concerns.    Social History  Born:[place]   Education: [employer]; and then went to [employer] training  Living Situation: [facility] alone and they have a place up in [place]. [place]  Relationships: together with Sid for 24 years and  for 20 years;   Children: 1 daughter who has a 15 yo son; Sid has two kids and no grand kids;  one of Sid's kids is getting  in [place] in September.  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, CNA, [employer]; [employer]; [employer]  Hobbies: gardening; observing nature  Patient is 'famous for gardening with flowers and her personality and people presence'  Substance Use/History of misuse: alcoholic who is sober since September 2024;   Financial  Concerns: none  Spiritual Background: Oriental orthodox but not observant  Spiritual Concerns/Needs: none at this time    Social History     Tobacco Use    Smoking status: Every Day     Current packs/day: 0.50     Average packs/day: 0.5 packs/day for 25.0 years (12.5 ttl pk-yrs)     Types: Cigarettes    Smokeless tobacco: Never   Vaping Use    Vaping status: Never Used   Substance Use Topics    Alcohol use: No     Alcohol/week: 0.0 standard drinks of alcohol    Drug use: No       Family History  Family History   Problem Relation Age of Onset    Cancer Father      Advance Care Planning:  Advance Directive:    apparently one done, not in Epic  Where is written copy located: n/a  Health Care Agent Contact Information: wants  Sid to be her HCA  POLST:   n/a  CODE STATUS: DNAR/DNI after today's conversation; also would not want a feeding tube;     No Known Allergies  Current Outpatient Medications   Medication Sig Dispense Refill    ACE/ARB/ARNI NOT PRESCRIBED (INTENTIONAL) Please choose reason not prescribed from choices below.      acetaminophen (TYLENOL) 500 MG tablet Acetaminophen Oral  Orally  PRN    inactive      amLODIPine (NORVASC) 5 MG tablet TAKE 1 TABLET(5 MG) BY MOUTH DAILY 90 tablet 0    buprenorphine-naloxone (SUBOXONE) 2-0.5 MG SUBL sublingual tablet Place 1 tablet under the tongue 2 times daily. 60 tablet 0    Calcium Acetate 667 MG TABS Take by mouth 3 times daily.      Ferrous Sulfate 324 (65 Fe) MG TBEC       metoprolol succinate ER (TOPROL XL) 25 MG 24 hr tablet Take 1 tablet (25 mg) by mouth daily. 90 tablet 3    multivitamin, therapeutic (THERA-VIT) TABS tablet Take 1 tablet by mouth daily      nicotine (NICODERM CQ) 21 MG/24HR 24 hr patch Place 1 patch onto the skin every 24 hours. (Patient not taking: Reported on 2/17/2025) 30 patch 5    pramipexole (MIRAPEX) 0.125 MG tablet TAKE 1 TABLET BY MOUTH AT BEDTIME 90 tablet 1    sodium bicarbonate 650 MG tablet Take 650 mg by mouth.      thiamine (B-1)  100 MG tablet Take 1 tablet (100 mg) by mouth daily. 100 tablet 3    torsemide (DEMADEX) 20 MG tablet Take 1 tablet (20 mg) by mouth daily. 90 tablet 3    valACYclovir (VALTREX) 500 MG tablet TAKE ONE TABLET BY MOUTH TWICE DAILY AS NEEDED 20 tablet 3    vitamin E (TOCOPHEROL) 200 units (90 mg) capsule Take 200 Units by mouth.       Past Medical History:   Diagnosis Date    Anxiety associated with depression 1/17/2014    Ascites     Chronic hepatitis C without hepatic coma (H) 4/3/2019    Hepatic encephalopathy (H) 2/8/2014    Nicotine dependence      Past Surgical History:   Procedure Laterality Date    breast implants      ESOPHAGOSCOPY, GASTROSCOPY, DUODENOSCOPY (EGD), COMBINED  1/24/2014    Procedure: COMBINED ESOPHAGOSCOPY, GASTROSCOPY, DUODENOSCOPY (EGD);;  Surgeon: Tony Moctezuma MD;  Location:  GI    ESOPHAGOSCOPY, GASTROSCOPY, DUODENOSCOPY (EGD), COMBINED N/A 9/13/2018    Procedure: COMBINED ESOPHAGOSCOPY, GASTROSCOPY, DUODENOSCOPY (EGD);  EGD/Colonoscopy ;  Surgeon: Saige Villalta MD;  Location:  GI    ORTHOPEDIC SURGERY      TKR left       REVIEW OF SYSTEMS:   ROS: 10 point ROS neg other than the symptoms noted above in the HPI and here:  Palliative Symptom Review (0=no symptom/no concern, 1=mild, 2=moderate, 3=severe):      Pain: 0      Fatigue: 1      Nausea: 0      Constipation: 0      Diarrhea: 0      Depressive Symptoms: 0      Anxiety: 0      Drowsiness: 0      Poor Appetite: 0      Shortness of Breath: 0      Insomnia: 0      Other: 0      Overall (0 good/no concerns, 3 very poor):  1    GENERAL APPEARANCE: healthy, alert and no distress; neatly groomed  EYES: Eyes grossly normal to inspection, PERRLA, conjunctivae and sclerae without injection or discharge, EOM intact   RESP:  no increased work of breathing; speaks in complete sentences;   MS: No musculoskeletal defects are noted  SKIN: No suspicious lesions or rashes, hydration status appears adequate with normal skin turgor    PSYCH: Alert and oriented x3; speech- coherent, normal rate and volume; able to articulate logical thoughts, able to abstract reason, no tangential thoughts, no hallucinations or delusions, mentation appears normal, Mood is euthymic. Affect is appropriate for this mood state and bright. Thought content is free of suicidal ideation, hallucinations, and delusions.  Eye contact is good during conversation.       Data Reviewed:  LABS: 01/24/2025 Cr 3.85; K 5.4; alb 3.6; Hgb 9.2;   IMAGING: no recent imaging available for review.    Impressions:  Palliative Performance Score:  (100% normal, 0% death):  60-70%  Decision Making Capacity:  very present  PDMP review:  Yes:   reviewed - controlled substances reflected in medication list.    CKD IV (eGFR 12)  ESLD 2/2 alcoholic cirrhosis sober since September 2024  Treated for lung cancer spring 2024 with XRT  Anemia  OUD on Suboxone    GOALS OF CARE:  02/17/2025   Venita's goals are life prolonging with some significant limits including DNAR/DNI, no feeding tubes or dialysis.  She is open to future hospitalizations but would not want her death prolonged if there is no chance to restore her to a reasonably functional status.  She hopes to die outside the hospital and comfortably.    Recommendations & Counseling:  No new medications to add today.  Follow-up in 3 months--sooner as needed for further GOC conversation or if Venita is admitted to the acute care hospital please get a palliative care consult   We discussed hospice a model of EOL care (Venita and Sid are familiar with it via their mothers) and she doesn't feel now is the right time to enroll.      Opioid Summary    Opioid Need: This patient has a condition that necessitates treatment with an opioid for longer than 7 days. Additionally, a non-opioid alternative was not appropriate or inadequate to manage patient s pain.  Diagnosis related to controlled substance prescribing: [place]UD  [place]  Review: We have reviewed the  patient's record in the [place] prescription monitoring program on 02/17/2025  Opioid Toxicity Review: We have reviewed the risks of opioid therapy and completed an assessment of toxicities.  Opioid Aberrant Use Concerns: None  Urine drug screen not obtained today.   Opioid Risk Score: not previously reported;     Counseling: All of the above was explained to the patient in lay language. The patient has verbalized a clear understanding of the discussion, asked appropriate questions, which have been answered to patient's apparent satisfaction. The patient is in agreement with the above plan.    68 minutes spent on the date of the encounter doing chart review, history and exam, patient education & counseling, documentation and other activities as noted above.    The longitudinal plan of care for the diagnosis(es)/condition(s) as documented were addressed during this visit. Due to the added complexity in care, I will continue to support Josette in the subsequent management and with ongoing continuity of care.    Kenney Becker MD MS FAAFP CAQHPM  [facility]  Office [phone]  Fax [phone]

## 2025-02-17 NOTE — NURSING NOTE
Patient confirms medications and allergies are accurate via patients echeck in completion, and or denies any changes since last reviewed/verified.     Current patient location:  [place]    Is the patient currently in the state of [place]? YES    Visit mode: VIDEO    If the visit is dropped, the patient can be reconnected by:VIDEO VISIT: Text to cell phone:   Telephone Information:   Mobile [phone]       Will anyone else be joining the visit? Carlos Muñoz  (If patient encounters technical issues they should call [phone])    Are changes needed to the allergy or medication list? No    Are refills needed on medications prescribed by this physician? NO    Rooming Documentation:  Questionnaire(s) not done per department protocol    Reason for visit: Consult    Jenna WILLF

## 2025-02-17 NOTE — LETTER
2/17/2025       RE: Josette Palacios  [address]  [facility] [address]     Dear Colleague,    Thank you for referring your patient, Josette Palacios, to the [facility] at [facility]. Please see a copy of my visit note below.    Virtual Visit Details    Type of service:  Video Visit   Video Start Time: 10:16 AM  Video End Time:11:21 AM    Originating Location (pt. Location): Home    Distant Location (provider location):  On-site  Platform used for Video Visit: [facility]    Palliative Care Outpatient Clinic Consultation Note    Patient:  Josette Palacios    Chief Complaint:   Josette Palacios 70 year old female who is presenting to the palliative medicine clinic today at the request of Ishaan West for a palliative care consultation secondary to ESRD.   The patient's primary care provider is:  Ishaan West.  We were joined by her , Sid.    History of Present Illness:  Josette Palacios is a 70 year old F with PMH significant for ETOH cirrhosis with ascites requiring paracentesis with last one last fall, chronic hep C (untreated), opioid dependency on Suboxone, CKD stage IV referred today for a goals of care conversation. She has chosen to not pursue iHD or creating a fistula.No Alcohol since September 2024    Distressing Symptom/s:  none    Patient's Disease Understanding: very good between Venita and Sid.    Coping:  no concerns.    Social History  Born:[place]   Education: [employer]; and then went to [employer] training  Living Situation: [facility] alone and they have a place up in [place]. [place]  Relationships: together with Sid for 24 years and  for 20 years;   Children: 1 daughter who has a 17 yo son; Sid has two kids and no grand kids;  one of Sid's kids is getting  in [place] in September.  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, CNA, [employer]; [employer]; [employer]  Hobbies: gardening; observing nature  Patient is 'famous for gardening with flowers and her personality and people presence'  Substance Use/History of misuse: alcoholic who is sober since September 2024;   Financial Concerns: none  Spiritual Background: Baptism but not observant  Spiritual Concerns/Needs: none at this time    Social History     Tobacco Use     Smoking status: Every Day     Current packs/day: 0.50     Average packs/day: 0.5 packs/day for 25.0 years (12.5 ttl pk-yrs)     Types: Cigarettes     Smokeless tobacco: Never   Vaping Use     Vaping status: Never Used   Substance Use Topics     Alcohol use: No     Alcohol/week: 0.0 standard drinks of alcohol     Drug use: No       Family History  Family History   Problem Relation Age of Onset     Cancer Father      Advance Care Planning:  Advance Directive:    apparently one done, not in Epic  Where is written copy located: n/a  Health Care Agent Contact Information: wants  Sid to be her HCA  POLST:   n/a  CODE STATUS: DNAR/DNI after today's conversation; also would not want a feeding tube;     No Known Allergies  Current Outpatient Medications   Medication Sig Dispense Refill     ACE/ARB/ARNI NOT PRESCRIBED (INTENTIONAL) Please choose reason not prescribed from choices below.       acetaminophen (TYLENOL) 500 MG tablet Acetaminophen Oral  Orally  PRN    inactive       amLODIPine (NORVASC) 5 MG tablet TAKE 1 TABLET(5 MG) BY MOUTH DAILY 90 tablet 0     buprenorphine-naloxone (SUBOXONE) 2-0.5 MG SUBL sublingual tablet Place 1 tablet under the tongue 2 times daily. 60 tablet 0     Calcium Acetate 667 MG TABS Take by mouth 3 times daily.       Ferrous Sulfate 324 (65 Fe) MG TBEC        metoprolol succinate ER (TOPROL XL) 25 MG 24 hr tablet Take 1 tablet (25 mg) by mouth daily. 90 tablet 3     multivitamin, therapeutic (THERA-VIT) TABS tablet Take 1 tablet  by mouth daily       nicotine (NICODERM CQ) 21 MG/24HR 24 hr patch Place 1 patch onto the skin every 24 hours. (Patient not taking: Reported on 2/17/2025) 30 patch 5     pramipexole (MIRAPEX) 0.125 MG tablet TAKE 1 TABLET BY MOUTH AT BEDTIME 90 tablet 1     sodium bicarbonate 650 MG tablet Take 650 mg by mouth.       thiamine (B-1) 100 MG tablet Take 1 tablet (100 mg) by mouth daily. 100 tablet 3     torsemide (DEMADEX) 20 MG tablet Take 1 tablet (20 mg) by mouth daily. 90 tablet 3     valACYclovir (VALTREX) 500 MG tablet TAKE ONE TABLET BY MOUTH TWICE DAILY AS NEEDED 20 tablet 3     vitamin E (TOCOPHEROL) 200 units (90 mg) capsule Take 200 Units by mouth.       Past Medical History:   Diagnosis Date     Anxiety associated with depression 1/17/2014     Ascites      Chronic hepatitis C without hepatic coma (H) 4/3/2019     Hepatic encephalopathy (H) 2/8/2014     Nicotine dependence      Past Surgical History:   Procedure Laterality Date     breast implants       ESOPHAGOSCOPY, GASTROSCOPY, DUODENOSCOPY (EGD), COMBINED  1/24/2014    Procedure: COMBINED ESOPHAGOSCOPY, GASTROSCOPY, DUODENOSCOPY (EGD);;  Surgeon: Tony Moctezuma MD;  Location:  GI     ESOPHAGOSCOPY, GASTROSCOPY, DUODENOSCOPY (EGD), COMBINED N/A 9/13/2018    Procedure: COMBINED ESOPHAGOSCOPY, GASTROSCOPY, DUODENOSCOPY (EGD);  EGD/Colonoscopy ;  Surgeon: Saige Villalta MD;  Location:  GI     ORTHOPEDIC SURGERY      TKR left       REVIEW OF SYSTEMS:   ROS: 10 point ROS neg other than the symptoms noted above in the HPI and here:  Palliative Symptom Review (0=no symptom/no concern, 1=mild, 2=moderate, 3=severe):      Pain: 0      Fatigue: 1      Nausea: 0      Constipation: 0      Diarrhea: 0      Depressive Symptoms: 0      Anxiety: 0      Drowsiness: 0      Poor Appetite: 0      Shortness of Breath: 0      Insomnia: 0      Other: 0      Overall (0 good/no concerns, 3 very poor):  1    GENERAL APPEARANCE: healthy, alert and no distress;  neatly groomed  EYES: Eyes grossly normal to inspection, PERRLA, conjunctivae and sclerae without injection or discharge, EOM intact   RESP:  no increased work of breathing; speaks in complete sentences;   MS: No musculoskeletal defects are noted  SKIN: No suspicious lesions or rashes, hydration status appears adequate with normal skin turgor   PSYCH: Alert and oriented x3; speech- coherent, normal rate and volume; able to articulate logical thoughts, able to abstract reason, no tangential thoughts, no hallucinations or delusions, mentation appears normal, Mood is euthymic. Affect is appropriate for this mood state and bright. Thought content is free of suicidal ideation, hallucinations, and delusions.  Eye contact is good during conversation.       Data Reviewed:  LABS: 01/24/2025 Cr 3.85; K 5.4; alb 3.6; Hgb 9.2;   IMAGING: no recent imaging available for review.    Impressions:  Palliative Performance Score:  (100% normal, 0% death):  60-70%  Decision Making Capacity:  very present  PDMP review:  Yes:   reviewed - controlled substances reflected in medication list.    CKD IV (eGFR 12)  ESLD 2/2 alcoholic cirrhosis sober since September 2024  Treated for lung cancer spring 2024 with XRT  Anemia  OUD on Suboxone    GOALS OF CARE:  02/17/2025   Venita's goals are life prolonging with some significant limits including DNAR/DNI, no feeding tubes or dialysis.  She is open to future hospitalizations but would not want her death prolonged if there is no chance to restore her to a reasonably functional status.  She hopes to die outside the hospital and comfortably.    Recommendations & Counseling:  No new medications to add today.  Follow-up in 3 months--sooner as needed for further GOC conversation or if Venita is admitted to the acute care hospital please get a palliative care consult   We discussed hospice a model of EOL care (Venita and Sid are familiar with it via their mothers) and she doesn't feel now is the right  time to enroll.      Opioid Summary    Opioid Need: This patient has a condition that necessitates treatment with an opioid for longer than 7 days. Additionally, a non-opioid alternative was not appropriate or inadequate to manage patient s pain.  Diagnosis related to [place] substance prescribing: [place]UD  [place]  Review: We have reviewed the patient's record in the [place] prescription monitoring program on 02/17/2025  Opioid Toxicity Review: We have reviewed the risks of opioid therapy and completed an assessment of toxicities.  Opioid Aberrant Use Concerns: None  Urine drug screen not obtained today.   Opioid Risk Score: not previously reported;     Counseling: All of the above was explained to the patient in lay language. The patient has verbalized a clear understanding of the discussion, asked appropriate questions, which have been answered to patient's apparent satisfaction. The patient is in agreement with the above plan.    68 minutes spent on the date of the encounter doing chart review, history and exam, patient education & counseling, documentation and other activities as noted above.    The longitudinal plan of care for the diagnosis(es)/condition(s) as documented were addressed during this visit. Due to the added complexity in care, I will continue to support Josette in the subsequent management and with ongoing continuity of care.    Kenney Becker MD MS FAAFP CAQHPM  [facility]  Office [phone]  Fax [phone]            Again, thank you for allowing me to participate in the care of your patient.      Sincerely,    Kenney Becker MD

## 2025-02-17 NOTE — PATIENT INSTRUCTIONS
It was good to see you today, Venita and Sid.    Here are the things we talked about:  Keep trying to have a baby--even at your ages!  No medicines to add  Keep living as fully as you can    Google hospice agencies where the cabin is.    Please bring a copy of your living will to a future appointment with Dr. West so it can get scanned into our system.    Someone from the team will reach out to schedule a follow up appointment in 3 months.     How to get a hold of us:  For non-urgent matters, MyChart works best.    For more urgent matters, or if you prefer not to use MyChart, call our clinic nurse coordinator Rashida Pinto RN at [phone]    We have an on-call number for evenings and weekends. Please call this only if you are having uncontrolled symptoms or serious side effects from your medicines: [phone].     For refills, please give us a week (5 working days) notice. We don't always have providers available everyday to do refills. If you call the day you run out of your medicine, we may not be able to refill it in time, so call 5 days in advance!    Kenney Becker MD MS FAAFP CAQHPM  [facility]  Office [phone]  Fax [phone]

## 2025-02-20 ENCOUNTER — MYC MEDICAL ADVICE (OUTPATIENT)
Dept: FAMILY MEDICINE | Facility: CLINIC | Age: 70
End: 2025-02-20
Payer: MEDICARE

## 2025-02-25 ENCOUNTER — MYC REFILL (OUTPATIENT)
Dept: FAMILY MEDICINE | Facility: CLINIC | Age: 70
End: 2025-02-25
Payer: MEDICARE

## 2025-02-25 DIAGNOSIS — Z86.19 HISTORY OF HERPES GENITALIS: Primary | ICD-10-CM

## 2025-02-26 ENCOUNTER — LAB (OUTPATIENT)
Dept: LAB | Facility: CLINIC | Age: 70
End: 2025-02-26
Payer: MEDICARE

## 2025-02-26 DIAGNOSIS — N18.5 CHRONIC KIDNEY DISEASE, STAGE V (H): ICD-10-CM

## 2025-02-26 LAB
ANION GAP SERPL CALCULATED.3IONS-SCNC: 16 MMOL/L (ref 7–15)
BUN SERPL-MCNC: 57.3 MG/DL (ref 8–23)
CALCIUM SERPL-MCNC: 8.5 MG/DL (ref 8.8–10.4)
CHLORIDE SERPL-SCNC: 105 MMOL/L (ref 98–107)
CREAT SERPL-MCNC: 4.33 MG/DL (ref 0.51–0.95)
EGFRCR SERPLBLD CKD-EPI 2021: 10 ML/MIN/1.73M2
GLUCOSE SERPL-MCNC: 144 MG/DL (ref 70–99)
HCO3 SERPL-SCNC: 16 MMOL/L (ref 22–29)
POTASSIUM SERPL-SCNC: 6 MMOL/L (ref 3.4–5.3)
SODIUM SERPL-SCNC: 137 MMOL/L (ref 135–145)

## 2025-02-26 PROCEDURE — 80048 BASIC METABOLIC PNL TOTAL CA: CPT

## 2025-02-26 PROCEDURE — 36415 COLL VENOUS BLD VENIPUNCTURE: CPT

## 2025-02-26 RX ORDER — VALACYCLOVIR HYDROCHLORIDE 500 MG/1
TABLET, FILM COATED ORAL
Qty: 20 TABLET | Refills: 3 | Status: SHIPPED | OUTPATIENT
Start: 2025-02-26

## 2025-03-04 ENCOUNTER — TRANSFERRED RECORDS (OUTPATIENT)
Dept: HEALTH INFORMATION MANAGEMENT | Facility: CLINIC | Age: 70
End: 2025-03-04
Payer: MEDICARE

## 2025-03-10 ENCOUNTER — TELEPHONE (OUTPATIENT)
Dept: FAMILY MEDICINE | Facility: CLINIC | Age: 70
End: 2025-03-10
Payer: MEDICARE

## 2025-03-10 NOTE — TELEPHONE ENCOUNTER
Call re: oxygen reverification  Would like most recent office visit notes faxed to them at [phone] for oxygen re certification     Faxed

## 2025-03-14 ENCOUNTER — MYC REFILL (OUTPATIENT)
Dept: FAMILY MEDICINE | Facility: CLINIC | Age: 70
End: 2025-03-14
Payer: MEDICARE

## 2025-03-14 DIAGNOSIS — I10 HYPERTENSION, BENIGN ESSENTIAL, GOAL BELOW 140/90: ICD-10-CM

## 2025-03-17 DIAGNOSIS — Z79.899 ENCOUNTER FOR MONITORING SUBOXONE MAINTENANCE THERAPY: ICD-10-CM

## 2025-03-17 DIAGNOSIS — Z51.81 ENCOUNTER FOR MONITORING SUBOXONE MAINTENANCE THERAPY: ICD-10-CM

## 2025-03-17 RX ORDER — METOPROLOL SUCCINATE 25 MG/1
25 TABLET, EXTENDED RELEASE ORAL DAILY
Qty: 90 TABLET | Refills: 3 | OUTPATIENT
Start: 2025-03-17

## 2025-03-17 RX ORDER — BUPRENORPHINE HYDROCHLORIDE AND NALOXONE HYDROCHLORIDE DIHYDRATE 2; .5 MG/1; MG/1
1 TABLET SUBLINGUAL DAILY
Qty: 60 TABLET | Refills: 0 | Status: SHIPPED | OUTPATIENT
Start: 2025-03-17 | End: 2025-03-19

## 2025-03-18 ENCOUNTER — TELEPHONE (OUTPATIENT)
Dept: FAMILY MEDICINE | Facility: CLINIC | Age: 70
End: 2025-03-18
Payer: MEDICARE

## 2025-03-18 DIAGNOSIS — Z79.899 ENCOUNTER FOR MONITORING SUBOXONE MAINTENANCE THERAPY: ICD-10-CM

## 2025-03-18 DIAGNOSIS — Z51.81 ENCOUNTER FOR MONITORING SUBOXONE MAINTENANCE THERAPY: ICD-10-CM

## 2025-03-18 NOTE — TELEPHONE ENCOUNTER
Pharmacy is calling about  buprenorphine-naloxone (SUBOXONE) 2-0.5 MG SUBL sublingual tablet.    Pharmacy is wondering if the instruction are correct, she normally takes 2 tablets but this one is listed as 1 tablet daily but still has the quantity of 60.    Can you confirm if you would patient to take 1 or 2 tablets, once confirmed when can follow up with pharmacy

## 2025-03-19 RX ORDER — BUPRENORPHINE HYDROCHLORIDE AND NALOXONE HYDROCHLORIDE DIHYDRATE 2; .5 MG/1; MG/1
1 TABLET SUBLINGUAL 2 TIMES DAILY
Qty: 60 TABLET | Refills: 0 | Status: SHIPPED | OUTPATIENT
Start: 2025-03-19

## 2025-03-19 NOTE — TELEPHONE ENCOUNTER
Called pharmacy and they need a new rx sent to pharmacy with the instructions updated due to it being a controlled substance.    I pended the new rx with it reading BID instead of once daily.    Thanks!  Andrés LOVE RN   [facility]

## 2025-03-31 ENCOUNTER — OFFICE VISIT (OUTPATIENT)
Dept: FAMILY MEDICINE | Facility: CLINIC | Age: 70
End: 2025-03-31
Payer: MEDICARE

## 2025-03-31 VITALS
TEMPERATURE: 97 F | HEIGHT: 66 IN | HEART RATE: 73 BPM | DIASTOLIC BLOOD PRESSURE: 76 MMHG | WEIGHT: 112.5 LBS | BODY MASS INDEX: 18.08 KG/M2 | SYSTOLIC BLOOD PRESSURE: 144 MMHG | RESPIRATION RATE: 18 BRPM | OXYGEN SATURATION: 98 %

## 2025-03-31 DIAGNOSIS — G25.81 RESTLESS LEG SYNDROME: Primary | ICD-10-CM

## 2025-03-31 DIAGNOSIS — Z51.81 ENCOUNTER FOR MONITORING SUBOXONE MAINTENANCE THERAPY: ICD-10-CM

## 2025-03-31 DIAGNOSIS — I10 ELEVATED BLOOD PRESSURE READING WITH DIAGNOSIS OF HYPERTENSION: ICD-10-CM

## 2025-03-31 DIAGNOSIS — Z86.19 HISTORY OF HERPES GENITALIS: ICD-10-CM

## 2025-03-31 DIAGNOSIS — L30.9 ECZEMA, UNSPECIFIED TYPE: ICD-10-CM

## 2025-03-31 DIAGNOSIS — K70.31 ALCOHOLIC CIRRHOSIS OF LIVER WITH ASCITES (H): ICD-10-CM

## 2025-03-31 DIAGNOSIS — G47.00 INSOMNIA, UNSPECIFIED TYPE: ICD-10-CM

## 2025-03-31 DIAGNOSIS — Z79.891 ENCOUNTER FOR MONITORING SUBOXONE MAINTENANCE THERAPY: ICD-10-CM

## 2025-03-31 PROCEDURE — 3077F SYST BP >= 140 MM HG: CPT | Performed by: FAMILY MEDICINE

## 2025-03-31 PROCEDURE — 3078F DIAST BP <80 MM HG: CPT | Performed by: FAMILY MEDICINE

## 2025-03-31 PROCEDURE — 99214 OFFICE O/P EST MOD 30 MIN: CPT | Performed by: FAMILY MEDICINE

## 2025-03-31 RX ORDER — GABAPENTIN 100 MG/1
100 CAPSULE ORAL 3 TIMES DAILY PRN
Qty: 90 CAPSULE | Refills: 5 | Status: SHIPPED | OUTPATIENT
Start: 2025-03-31

## 2025-03-31 RX ORDER — SODIUM POLYSTYRENE SULFONATE 4.1 MEQ/G
POWDER, FOR SUSPENSION ORAL; RECTAL DAILY
COMMUNITY
Start: 2025-02-07

## 2025-03-31 ASSESSMENT — PATIENT HEALTH QUESTIONNAIRE - PHQ9: SUM OF ALL RESPONSES TO PHQ QUESTIONS 1-9: 8

## 2025-03-31 NOTE — PROGRESS NOTES
"Assessment & Plan     Restless leg syndrome  Not at goal   - gabapentin (NEURONTIN) 100 MG capsule; Take 1 capsule (100 mg) by mouth 3 times daily as needed for other (restless leg symptoms).    Eczema, unspecified type  At goal     Insomnia, unspecified type  Not at goal     Encounter for monitoring Suboxone maintenance therapy  At goal     Elevated blood pressure reading with diagnosis of hypertension  Not at goal     Alcoholic cirrhosis of liver with ascites (H)  At goal     History of herpes genitalis  At goal     Patient Instructions   Encouraged to again try prepared meals    Stop mirapex start gabapentin    Followup 4-6 weeks     Subjective   Josette is a 70 year old, presenting for the following health issues:  RECHECK      3/31/2025     1:57 PM   Additional Questions   Roomed by lizbeth   Accompanied by      History of Present Illness       Reason for visit:  Follow up-sleep issues    She eats 2-3 servings of fruits and vegetables daily.She consumes 1 sweetened beverage(s) daily.She exercises with enough effort to increase her heart rate 9 or less minutes per day.  She exercises with enough effort to increase her heart rate 3 or less days per week.   She is taking medications regularly.        Seeing palliative care, ascites OK for now, poor sleep, tired during day, has blood pressure monitor at home, poor appetite wt dn.         Review of Systems  Constitutional, HEENT, cardiovascular, pulmonary, gi and gu systems are negative, except as otherwise noted.      Objective    BP (!) 144/76 (BP Location: Right arm, Patient Position: Sitting, Cuff Size: Adult Regular)   Pulse 73   Temp 97  F (36.1  C) (Temporal)   Resp 18   Ht 1.676 m (5' 6\")   Wt 51 kg (112 lb 8 oz)   LMP  (LMP Unknown)   SpO2 98%   BMI 18.16 kg/m    Body mass index is 18.16 kg/m .  Physical Exam   GENERAL: frail, fatigued, and appears older than stated age  RESP: rhonchi throughout, bronchial breath sounds, and decreased breath " sounds  CV: regular rate and rhythm, normal S1 S2, no S3 or S4, no murmur, click or rub, no peripheral edema  ABDOMEN: soft, nontender, no hepatosplenomegaly, no masses and bowel sounds normal  MS: no gross musculoskeletal defects noted, no edema  SKIN: ecchymoses - arms, hair quality thin, and sallow color        Signed Electronically by: Ishaan West MD

## 2025-04-01 ENCOUNTER — TRANSFERRED RECORDS (OUTPATIENT)
Dept: HEALTH INFORMATION MANAGEMENT | Facility: CLINIC | Age: 70
End: 2025-04-01
Payer: MEDICARE

## 2025-04-01 PROBLEM — I50.33 ACUTE ON CHRONIC HEART FAILURE WITH PRESERVED EJECTION FRACTION (H): Status: RESOLVED | Noted: 2024-12-24 | Resolved: 2025-04-01

## 2025-04-01 PROBLEM — R79.89 LOW VITAMIN D LEVEL: Status: RESOLVED | Noted: 2017-03-03 | Resolved: 2025-04-01

## 2025-04-01 PROBLEM — F11.11 HISTORY OF HEROIN ABUSE (H): Status: RESOLVED | Noted: 2022-07-25 | Resolved: 2025-04-01

## 2025-04-01 NOTE — PATIENT INSTRUCTIONS
Encouraged to again try prepared meals    Stop mirapex start gabapentin    Followup 4-6 weeks   
 used

## 2025-04-07 ENCOUNTER — MYC MEDICAL ADVICE (OUTPATIENT)
Dept: FAMILY MEDICINE | Facility: CLINIC | Age: 70
End: 2025-04-07
Payer: MEDICARE

## 2025-04-08 ENCOUNTER — TELEPHONE (OUTPATIENT)
Dept: PALLIATIVE CARE | Facility: CLINIC | Age: 70
End: 2025-04-08
Payer: MEDICARE

## 2025-04-08 NOTE — CONFIDENTIAL NOTE
I rec'd a mychart message from Venita's , Carlos, about her current situation at [facility].  I called and we spoke for about 15 minutes.  She is doing poorly and has met with the palliative care team there and they are recommending hospice/comfort care for her.  They are meeting again at 10 this AM.  I provided emotional support for him as he faces this difficult situation.    Kenney Becker MD MS FAAFP    [facility]  Office [phone]  Fax [phone]

## 2025-04-16 NOTE — CONFIDENTIAL NOTE
Called patient - moving to [facility] hospice later today. Spouse will be out of town for a show.  Ishaan West MD

## (undated) RX ORDER — FENTANYL CITRATE 50 UG/ML
INJECTION, SOLUTION INTRAMUSCULAR; INTRAVENOUS
Status: DISPENSED
Start: 2018-09-13

## (undated) RX ORDER — LIDOCAINE HYDROCHLORIDE 20 MG/ML
INJECTION, SOLUTION EPIDURAL; INFILTRATION; INTRACAUDAL; PERINEURAL
Status: DISPENSED
Start: 2018-09-13

## (undated) RX ORDER — PROPOFOL 10 MG/ML
INJECTION, EMULSION INTRAVENOUS
Status: DISPENSED
Start: 2018-09-13